# Patient Record
Sex: FEMALE | Race: BLACK OR AFRICAN AMERICAN | NOT HISPANIC OR LATINO | Employment: OTHER | URBAN - METROPOLITAN AREA
[De-identification: names, ages, dates, MRNs, and addresses within clinical notes are randomized per-mention and may not be internally consistent; named-entity substitution may affect disease eponyms.]

---

## 2017-02-01 ENCOUNTER — APPOINTMENT (OUTPATIENT)
Dept: WOMENS IMAGING | Facility: HOSPITAL | Age: 52
End: 2017-02-01

## 2017-02-01 PROCEDURE — G0279 TOMOSYNTHESIS, MAMMO: HCPCS | Performed by: RADIOLOGY

## 2017-02-01 PROCEDURE — MDREVIEWSP: Performed by: RADIOLOGY

## 2017-02-01 PROCEDURE — G0206 DX MAMMO INCL CAD UNI: HCPCS | Performed by: RADIOLOGY

## 2017-02-01 PROCEDURE — 76641 ULTRASOUND BREAST COMPLETE: CPT | Performed by: RADIOLOGY

## 2017-02-28 ENCOUNTER — OFFICE VISIT (OUTPATIENT)
Dept: INTERNAL MEDICINE | Facility: CLINIC | Age: 52
End: 2017-02-28

## 2017-02-28 VITALS
WEIGHT: 145 LBS | RESPIRATION RATE: 18 BRPM | SYSTOLIC BLOOD PRESSURE: 126 MMHG | HEART RATE: 86 BPM | OXYGEN SATURATION: 97 % | HEIGHT: 67 IN | BODY MASS INDEX: 22.76 KG/M2 | DIASTOLIC BLOOD PRESSURE: 70 MMHG

## 2017-02-28 DIAGNOSIS — M54.32 SCIATIC NERVE PAIN, LEFT: Primary | ICD-10-CM

## 2017-02-28 PROCEDURE — 99213 OFFICE O/P EST LOW 20 MIN: CPT | Performed by: INTERNAL MEDICINE

## 2017-02-28 RX ORDER — METAXALONE 800 MG/1
800 TABLET ORAL 3 TIMES DAILY PRN
Qty: 30 TABLET | Refills: 1 | Status: SHIPPED | OUTPATIENT
Start: 2017-02-28 | End: 2017-03-10

## 2017-02-28 RX ORDER — MELOXICAM 15 MG/1
15 TABLET ORAL DAILY
Qty: 30 TABLET | Refills: 1 | Status: SHIPPED | OUTPATIENT
Start: 2017-02-28 | End: 2021-04-19

## 2017-02-28 RX ORDER — METHYLPREDNISOLONE ACETATE 80 MG/ML
80 INJECTION, SUSPENSION INTRA-ARTICULAR; INTRALESIONAL; INTRAMUSCULAR; SOFT TISSUE ONCE
Status: DISCONTINUED | OUTPATIENT
Start: 2017-02-28 | End: 2017-04-10 | Stop reason: SDDI

## 2017-02-28 NOTE — PROGRESS NOTES
"Chief Complaint   Patient presents with   • Hip Pain     left hip pain, sharp, hurts to walk, continuous, x 3 years       History of Present Illness   Meenakshi Alcala is a 51 y.o. female presents for acute care left \"hip\" pain that radiates down to her foot. Started about 1 week ago and has been keeping her up at night. She has increased pain if trying to rotate the hip. Similar pain on her right hip in the past. Resolved w/ steroid injection.   The following portions of the patient's history were reviewed and updated as appropriate: allergies, current medications, past family history, past medical history, past social history, past surgical history and problem list.  Current Outpatient Prescriptions on File Prior to Visit   Medication Sig Dispense Refill   • albuterol (PROVENTIL HFA;VENTOLIN HFA) 108 (90 BASE) MCG/ACT inhaler Inhale 2 puffs Every 6 (Six) Hours. 1 inhaler 6   • amLODIPine (NORVASC) 2.5 MG tablet Take 1 tablet by mouth nightly.     • diclofenac (VOLTAREN) 1 % gel gel Apply 4 g topically 4 (Four) Times a Day. 100 g 6   • fluticasone (FLONASE) 50 MCG/ACT nasal spray PLACE TWO SPRAYS IN EACH NOSTRIL ONCE DAILY 16 each 3   • Linaclotide (LINZESS) 145 MCG capsule Take 145 mcg by mouth daily. 90 capsule 2   • sertraline (ZOLOFT) 100 MG tablet Take 1 tablet by mouth Daily. 90 tablet 2   • cloNIDine (CATAPRES) 0.3 MG tablet Take 1 tablet by mouth daily.       No current facility-administered medications on file prior to visit.      Review of Systems   Constitutional: Negative.    HENT: Negative.    Eyes: Negative.    Respiratory: Negative.    Cardiovascular: Negative.    Gastrointestinal: Negative.    Endocrine: Negative.    Genitourinary: Negative.    Musculoskeletal:        Left low back/ hip/ leg pain   Skin: Negative.    Allergic/Immunologic: Negative.    Neurological: Negative.    Hematological: Negative.    Psychiatric/Behavioral: Negative.        Objective   Physical Exam   Constitutional: She is " "oriented to person, place, and time. She appears well-developed and well-nourished.   HENT:   Head: Normocephalic and atraumatic.   Eyes: EOM are normal. Pupils are equal, round, and reactive to light.   Neck: Normal range of motion.   Cardiovascular: Normal rate, regular rhythm and normal heart sounds.    Pulmonary/Chest: Effort normal and breath sounds normal.   Musculoskeletal:   Pain to palpation low back left side and w/ hip abduction.    Neurological: She is alert and oriented to person, place, and time.   Psychiatric: She has a normal mood and affect. Her behavior is normal. Judgment and thought content normal.   Nursing note and vitals reviewed.       Visit Vitals   • /70   • Pulse 86   • Resp 18   • Ht 67\" (170.2 cm)   • Wt 145 lb (65.8 kg)   • SpO2 97%   • BMI 22.71 kg/m2       Assessment/Plan   Diagnoses and all orders for this visit:    Sciatic nerve pain, left  -     methylPREDNISolone acetate (DEPO-medrol) injection 80 mg; Inject 1 mL into the shoulder, thigh, or buttocks 1 (One) Time.  -     Ambulatory Referral to Physical Therapy Evaluate and treat    Other orders  -     meloxicam (MOBIC) 15 MG tablet; Take 1 tablet by mouth Daily.  -     metaxalone (SKELAXIN) 800 MG tablet; Take 1 tablet by mouth 3 (Three) Times a Day As Needed for muscle spasms.               "

## 2017-03-10 ENCOUNTER — TELEPHONE (OUTPATIENT)
Dept: INTERNAL MEDICINE | Facility: CLINIC | Age: 52
End: 2017-03-10

## 2017-03-10 RX ORDER — CYCLOBENZAPRINE HCL 10 MG
10 TABLET ORAL 3 TIMES DAILY PRN
Qty: 30 TABLET | Refills: 0 | Status: SHIPPED | OUTPATIENT
Start: 2017-03-10 | End: 2017-06-09 | Stop reason: SDUPTHER

## 2017-03-10 NOTE — TELEPHONE ENCOUNTER
Pt was given skelaxin on 2/28/2017 and PA was denied  Wants to know if she can get something else for her back . still hurting

## 2017-03-10 NOTE — TELEPHONE ENCOUNTER
rx for flexeril sent to pharmacy. This can cause sedation so she can try 1/2 tab and should not drive after taking.   Please make sure she has scheduled for physical therapy.

## 2017-04-10 ENCOUNTER — LAB (OUTPATIENT)
Dept: LAB | Facility: HOSPITAL | Age: 52
End: 2017-04-10

## 2017-04-10 ENCOUNTER — OFFICE VISIT (OUTPATIENT)
Dept: ONCOLOGY | Facility: CLINIC | Age: 52
End: 2017-04-10

## 2017-04-10 VITALS
WEIGHT: 144.2 LBS | SYSTOLIC BLOOD PRESSURE: 132 MMHG | BODY MASS INDEX: 23.18 KG/M2 | HEIGHT: 66 IN | RESPIRATION RATE: 16 BRPM | TEMPERATURE: 98.1 F | DIASTOLIC BLOOD PRESSURE: 70 MMHG | HEART RATE: 76 BPM

## 2017-04-10 DIAGNOSIS — C50.411 MALIGNANT NEOPLASM OF UPPER-OUTER QUADRANT OF RIGHT FEMALE BREAST (HCC): Primary | ICD-10-CM

## 2017-04-10 DIAGNOSIS — C50.411 MALIGNANT NEOPLASM OF UPPER-OUTER QUADRANT OF RIGHT FEMALE BREAST (HCC): ICD-10-CM

## 2017-04-10 LAB
ALBUMIN SERPL-MCNC: 4.7 G/DL (ref 3.5–5.2)
ALBUMIN/GLOB SERPL: 1.7 G/DL (ref 1.1–2.4)
ALP SERPL-CCNC: 123 U/L (ref 38–116)
ALT SERPL W P-5'-P-CCNC: 20 U/L (ref 0–33)
ANION GAP SERPL CALCULATED.3IONS-SCNC: 10.6 MMOL/L
AST SERPL-CCNC: 21 U/L (ref 0–32)
BASOPHILS # BLD AUTO: 0.05 10*3/MM3 (ref 0–0.1)
BASOPHILS NFR BLD AUTO: 1 % (ref 0–1.1)
BILIRUB SERPL-MCNC: 0.3 MG/DL (ref 0.1–1.2)
BUN BLD-MCNC: 9 MG/DL (ref 6–20)
BUN/CREAT SERPL: 16.7 (ref 7.3–30)
CALCIUM SPEC-SCNC: 9.8 MG/DL (ref 8.5–10.2)
CHLORIDE SERPL-SCNC: 103 MMOL/L (ref 98–107)
CO2 SERPL-SCNC: 30.4 MMOL/L (ref 22–29)
CREAT BLD-MCNC: 0.54 MG/DL (ref 0.6–1.1)
DEPRECATED RDW RBC AUTO: 47.9 FL (ref 37–49)
EOSINOPHIL # BLD AUTO: 0.14 10*3/MM3 (ref 0–0.36)
EOSINOPHIL NFR BLD AUTO: 2.7 % (ref 1–5)
ERYTHROCYTE [DISTWIDTH] IN BLOOD BY AUTOMATED COUNT: 14.9 % (ref 11.7–14.5)
GFR SERPL CREATININE-BSD FRML MDRD: 143 ML/MIN/1.73
GLOBULIN UR ELPH-MCNC: 2.7 GM/DL (ref 1.8–3.5)
GLUCOSE BLD-MCNC: 98 MG/DL (ref 74–124)
HCT VFR BLD AUTO: 41.6 % (ref 34–45)
HGB BLD-MCNC: 13 G/DL (ref 11.5–14.9)
IMM GRANULOCYTES # BLD: 0.01 10*3/MM3 (ref 0–0.03)
IMM GRANULOCYTES NFR BLD: 0.2 % (ref 0–0.5)
LYMPHOCYTES # BLD AUTO: 2.03 10*3/MM3 (ref 1–3.5)
LYMPHOCYTES NFR BLD AUTO: 38.7 % (ref 20–49)
MCH RBC QN AUTO: 28.3 PG (ref 27–33)
MCHC RBC AUTO-ENTMCNC: 31.3 G/DL (ref 32–35)
MCV RBC AUTO: 90.6 FL (ref 83–97)
MONOCYTES # BLD AUTO: 0.41 10*3/MM3 (ref 0.25–0.8)
MONOCYTES NFR BLD AUTO: 7.8 % (ref 4–12)
NEUTROPHILS # BLD AUTO: 2.6 10*3/MM3 (ref 1.5–7)
NEUTROPHILS NFR BLD AUTO: 49.6 % (ref 39–75)
NRBC BLD MANUAL-RTO: 0 /100 WBC (ref 0–0)
PLATELET # BLD AUTO: 212 10*3/MM3 (ref 150–375)
PMV BLD AUTO: 9.3 FL (ref 8.9–12.1)
POTASSIUM BLD-SCNC: 4.9 MMOL/L (ref 3.5–4.7)
PROT SERPL-MCNC: 7.4 G/DL (ref 6.3–8)
RBC # BLD AUTO: 4.59 10*6/MM3 (ref 3.9–5)
SODIUM BLD-SCNC: 144 MMOL/L (ref 134–145)
WBC NRBC COR # BLD: 5.24 10*3/MM3 (ref 4–10)

## 2017-04-10 PROCEDURE — 36415 COLL VENOUS BLD VENIPUNCTURE: CPT

## 2017-04-10 PROCEDURE — 99213 OFFICE O/P EST LOW 20 MIN: CPT | Performed by: INTERNAL MEDICINE

## 2017-04-10 PROCEDURE — 80053 COMPREHEN METABOLIC PANEL: CPT

## 2017-04-10 PROCEDURE — 85025 COMPLETE CBC W/AUTO DIFF WBC: CPT

## 2017-04-10 NOTE — PROGRESS NOTES
Subjective .     REASONS FOR FOLLOWUP:  Breast cancer    HISTORY OF PRESENT ILLNESS:  The patient is a 51 y.o. year old female  who is here for follow-up with the above-mentioned history.    Denies neurological symptoms.  Denies weight loss.  Denies pain other than chronic sciatica.  She is currently in her second week of physical therapy.  She is being treated through her PCP, Dr. Zavaleta for this.  No change in chronic nausea.    Past Medical History:   Diagnosis Date   • Anemia    • H/O transfusion of packed red blood cells    • Headache    • Malignant neoplasm of breast 2013    Right, T2N1M0, Stage IIB       HEMATOLOGIC/ONCOLOGIC HISTORY:  (History from previous dates can be found in the separate document.)    MEDICATIONS    Current Outpatient Prescriptions:   •  albuterol (PROVENTIL HFA;VENTOLIN HFA) 108 (90 BASE) MCG/ACT inhaler, Inhale 2 puffs Every 6 (Six) Hours., Disp: 1 inhaler, Rfl: 6  •  amLODIPine (NORVASC) 2.5 MG tablet, Take 1 tablet by mouth nightly., Disp: , Rfl:   •  cloNIDine (CATAPRES) 0.3 MG tablet, Take 1 tablet by mouth daily., Disp: , Rfl:   •  cyclobenzaprine (FLEXERIL) 10 MG tablet, Take 1 tablet by mouth 3 (Three) Times a Day As Needed for muscle spasms., Disp: 30 tablet, Rfl: 0  •  diclofenac (VOLTAREN) 1 % gel gel, Apply 4 g topically 4 (Four) Times a Day., Disp: 100 g, Rfl: 6  •  fluticasone (FLONASE) 50 MCG/ACT nasal spray, PLACE TWO SPRAYS IN EACH NOSTRIL ONCE DAILY, Disp: 16 each, Rfl: 3  •  Linaclotide (LINZESS) 145 MCG capsule, Take 145 mcg by mouth daily., Disp: 90 capsule, Rfl: 2  •  meloxicam (MOBIC) 15 MG tablet, Take 1 tablet by mouth Daily., Disp: 30 tablet, Rfl: 1  •  sertraline (ZOLOFT) 100 MG tablet, Take 1 tablet by mouth Daily., Disp: 90 tablet, Rfl: 2  No current facility-administered medications for this visit.     ALLERGIES:     Allergies   Allergen Reactions   • Erythromycin        SOCIAL HISTORY:       Social History     Social History   • Marital status:   "    Spouse name: N/A   • Number of children: N/A   • Years of education: College     Occupational History   • [employer]     Social History Main Topics   • Smoking status: Former Smoker     Packs/day: 1.00     Years: 25.00     Types: Cigarettes   • Smokeless tobacco: Former User   • Alcohol use Yes      Comment: Occasional   • Drug use: No   • Sexual activity: Not on file     Other Topics Concern   • Not on file     Social History Narrative         FAMILY HISTORY:  Family History   Problem Relation Age of Onset   • Heart disease Mother    • Hypertension Mother    • Stomach cancer Father    • Ovarian cancer Maternal Aunt    • Stomach cancer Maternal Aunt        REVIEW OF SYSTEMS:  GENERAL: No change in appetite or weight;   No fevers, chills, sweats.    SKIN: No nonhealing lesions.   No rashes.  HEME/LYMPH: No easy bruising, bleeding.   No swollen nodes.   EYES: No vision changes or diplopia.   ENT: No tinnitus, hearing loss, gum bleeding, epistaxis, hoarseness or dysphagia.   RESPIRATORY: No cough, shortness of breath, hemoptysis or wheezing.   CVS: No chest pain, palpitations, orthopnea, dyspnea on exertion or PND.   GI: see HPI  : No lower tract obstructive symptoms, dysuria or hematuria.   MUSCULOSKELETAL: see HPI  NEUROLOGICAL: No global weakness, loss of consciousness or seizures.   PSYCHIATRIC: No increased nervousness, mood changes or depression.       Objective    Vitals:    04/10/17 1025   BP: 132/70   Pulse: 76   Resp: 16   Temp: 98.1 °F (36.7 °C)   Weight: 144 lb 3.2 oz (65.4 kg)   Height: 66.14\" (168 cm)  Comment: new ht. without shoes   PainSc: 10-Worst pain ever  Comment: left hip     Current Status 4/10/2017   ECOG score 0      PHYSICAL EXAM:    GENERAL:  Well-developed, well-nourished in no acute distress.   SKIN:  Warm, dry without rashes, purpura or petechiae.  HEAD:  Normocephalic.  EYES:  Pupils equal, round and reactive to light.  EOMs intact.  Conjunctivae " normal.  EARS:  Hearing intact.  NOSE:  Septum midline.  No excoriations or nasal discharge.  MOUTH:  Tongue is well-papillated; no stomatitis or ulcers.  Lips normal.  THROAT:  Oropharynx without lesions or exudates.  NECK:  Supple with good range of motion; no thyromegaly or masses, no JVD.  LYMPHATICS:  No cervical, supraclavicular, axillary or inguinal adenopathy.  CHEST:  Lungs clear to percussion and auscultation. Good airflow.  CARDIAC:  Regular rate and rhythm without murmurs, rubs or gallops. Normal S1,S2.  BREAST: no masses by palpation or inspection   ABDOMEN:  Soft, nontender with no organomegaly or masses.  EXTREMITIES:  No clubbing, cyanosis or edema.  NEUROLOGICAL:  Cranial Nerves II-XII grossly intact.  No focal neurological deficits.  PSYCHIATRIC:  Normal affect and mood.      RECENT LABS:        WBC   Date/Time Value Ref Range Status   04/10/2017 10:11 AM 5.24 4.00 - 10.00 10*3/mm3 Final     Hemoglobin   Date/Time Value Ref Range Status   04/10/2017 10:11 AM 13.0 11.5 - 14.9 g/dL Final     Platelets   Date/Time Value Ref Range Status   04/10/2017 10:11  150 - 375 10*3/mm3 Final       Assessment/Plan     ASSESSMENT:  1. T2N1M0 (stage IIB) invasive ductal carcinoma of the right breast. Floyd score 9 of 9 (high grade), triple-negative, 3.3 cm tumor. Right mastectomy and TRAM flap reconstruction. Completed dose-dense Adriamycin/Cytoxan with dose-dense Taxol 07/11/2013. Completed radiation October 2013.     2. Nausea.  IBS.  Managed by both Dr. Gallegos and Dr. Vandana Zavaleta.    3. Intermittent blurry vision, left eye more so than right eye.  She has seen her ophthalmologist for this.  She states nothing concerning was found.  She follows with Dr. Kiran Cabrera of neurology for this.  A prior MRI read as possible optic neuritis.  She states the follow-up MRI was fine.  She did not complain of this today.    4.  Weight loss.  Resolved.  165 pounds October 2014.  150 pounds April 2015.  145 pounds  January 2016.  140 pounds February 2016.  136 pounds April 1, 2016.  132 pounds April 25, 2016.  Gained, up to 139 pounds, 5/9/16.  Weight is been stable since then.    5.  2 liver lesions on 1/31/13 staging CAT scan.  Liver protocol CT subsequently read as benign liver lesions.  Liver lesions unchanged on CT 5/4/16.    6.  She has an annual  for breast cancer.  She would like to donate the money raised to needy patients with breast cancer.  She does not want to go through a foundation.  She plans on calling our  to discuss options in 6 months or so.    7.  Chronic sciatica.  Treatment through her PCP, Dr. Vandana Zavaleta.  Currently undergoing physical therapy.    PLAN:   M.D. CBC CMP 6 months  Her port has been removed.   Last mammogram, 2/1/17, BI-RADS 2.

## 2017-05-30 DIAGNOSIS — K59.09 CHRONIC CONSTIPATION: ICD-10-CM

## 2017-05-30 RX ORDER — LINACLOTIDE 145 UG/1
CAPSULE, GELATIN COATED ORAL
Qty: 90 CAPSULE | Refills: 1 | Status: SHIPPED | OUTPATIENT
Start: 2017-05-30 | End: 2021-02-22

## 2017-06-02 ENCOUNTER — LAB (OUTPATIENT)
Dept: INTERNAL MEDICINE | Facility: CLINIC | Age: 52
End: 2017-06-02

## 2017-06-02 ENCOUNTER — CLINICAL SUPPORT (OUTPATIENT)
Dept: INTERNAL MEDICINE | Facility: CLINIC | Age: 52
End: 2017-06-02

## 2017-06-02 DIAGNOSIS — Z78.0 POSTMENOPAUSAL: Primary | ICD-10-CM

## 2017-06-02 DIAGNOSIS — C50.411 MALIGNANT NEOPLASM OF UPPER-OUTER QUADRANT OF RIGHT FEMALE BREAST (HCC): ICD-10-CM

## 2017-06-02 PROCEDURE — 77080 DXA BONE DENSITY AXIAL: CPT | Performed by: INTERNAL MEDICINE

## 2017-06-03 LAB
ALBUMIN SERPL-MCNC: 4.7 G/DL (ref 3.5–5.2)
ALBUMIN/GLOB SERPL: 1.8 G/DL
ALP SERPL-CCNC: 106 U/L (ref 39–117)
ALT SERPL-CCNC: 17 U/L (ref 1–33)
AST SERPL-CCNC: 20 U/L (ref 1–32)
BASOPHILS # BLD AUTO: 0.05 10*3/MM3 (ref 0–0.2)
BASOPHILS NFR BLD AUTO: 1 % (ref 0–1.5)
BILIRUB SERPL-MCNC: 0.4 MG/DL (ref 0.1–1.2)
BUN SERPL-MCNC: 10 MG/DL (ref 6–20)
BUN/CREAT SERPL: 18.9 (ref 7–25)
CALCIUM SERPL-MCNC: 10.3 MG/DL (ref 8.6–10.5)
CHLORIDE SERPL-SCNC: 101 MMOL/L (ref 98–107)
CO2 SERPL-SCNC: 28.4 MMOL/L (ref 22–29)
CREAT SERPL-MCNC: 0.53 MG/DL (ref 0.57–1)
EOSINOPHIL # BLD AUTO: 0.08 10*3/MM3 (ref 0–0.7)
EOSINOPHIL NFR BLD AUTO: 1.6 % (ref 0.3–6.2)
ERYTHROCYTE [DISTWIDTH] IN BLOOD BY AUTOMATED COUNT: 14.8 % (ref 11.7–13)
GLOBULIN SER CALC-MCNC: 2.6 GM/DL
GLUCOSE SERPL-MCNC: 69 MG/DL (ref 65–99)
HCT VFR BLD AUTO: 38.8 % (ref 35.6–45.5)
HGB BLD-MCNC: 12.3 G/DL (ref 11.9–15.5)
IMM GRANULOCYTES # BLD: 0 10*3/MM3 (ref 0–0.03)
IMM GRANULOCYTES NFR BLD: 0 % (ref 0–0.5)
LYMPHOCYTES # BLD AUTO: 2.03 10*3/MM3 (ref 0.9–4.8)
LYMPHOCYTES NFR BLD AUTO: 39.6 % (ref 19.6–45.3)
MCH RBC QN AUTO: 28.1 PG (ref 26.9–32)
MCHC RBC AUTO-ENTMCNC: 31.7 G/DL (ref 32.4–36.3)
MCV RBC AUTO: 88.8 FL (ref 80.5–98.2)
MONOCYTES # BLD AUTO: 0.55 10*3/MM3 (ref 0.2–1.2)
MONOCYTES NFR BLD AUTO: 10.7 % (ref 5–12)
NEUTROPHILS # BLD AUTO: 2.41 10*3/MM3 (ref 1.9–8.1)
NEUTROPHILS NFR BLD AUTO: 47.1 % (ref 42.7–76)
PLATELET # BLD AUTO: 282 10*3/MM3 (ref 140–500)
POTASSIUM SERPL-SCNC: 4.2 MMOL/L (ref 3.5–5.2)
PROT SERPL-MCNC: 7.3 G/DL (ref 6–8.5)
RBC # BLD AUTO: 4.37 10*6/MM3 (ref 3.9–5.2)
SODIUM SERPL-SCNC: 142 MMOL/L (ref 136–145)
WBC # BLD AUTO: 5.12 10*3/MM3 (ref 4.5–10.7)

## 2017-06-04 NOTE — PROGRESS NOTES
Your laboratory results are NORMAL. We will discuss these results in detail at your next office visit. Please call with any questions or concerns.  Sincerely,  Vandana cervantes MD

## 2017-06-09 ENCOUNTER — OFFICE VISIT (OUTPATIENT)
Dept: INTERNAL MEDICINE | Facility: CLINIC | Age: 52
End: 2017-06-09

## 2017-06-09 VITALS
DIASTOLIC BLOOD PRESSURE: 74 MMHG | WEIGHT: 150 LBS | SYSTOLIC BLOOD PRESSURE: 116 MMHG | HEART RATE: 84 BPM | OXYGEN SATURATION: 99 % | BODY MASS INDEX: 24.11 KG/M2

## 2017-06-09 DIAGNOSIS — M54.42 CHRONIC LEFT-SIDED LOW BACK PAIN WITH LEFT-SIDED SCIATICA: Primary | ICD-10-CM

## 2017-06-09 DIAGNOSIS — G89.29 CHRONIC LEFT-SIDED LOW BACK PAIN WITH LEFT-SIDED SCIATICA: Primary | ICD-10-CM

## 2017-06-09 PROBLEM — M85.80 OSTEOPENIA: Status: ACTIVE | Noted: 2017-06-09

## 2017-06-09 PROCEDURE — 72110 X-RAY EXAM L-2 SPINE 4/>VWS: CPT | Performed by: INTERNAL MEDICINE

## 2017-06-09 PROCEDURE — 99214 OFFICE O/P EST MOD 30 MIN: CPT | Performed by: INTERNAL MEDICINE

## 2017-06-09 RX ORDER — TRAMADOL HYDROCHLORIDE 50 MG/1
50 TABLET ORAL EVERY 6 HOURS PRN
Qty: 30 TABLET | Refills: 3 | Status: SHIPPED | OUTPATIENT
Start: 2017-06-09 | End: 2018-01-31 | Stop reason: SDUPTHER

## 2017-06-09 RX ORDER — CYCLOBENZAPRINE HCL 10 MG
10 TABLET ORAL 3 TIMES DAILY PRN
Qty: 30 TABLET | Refills: 3 | Status: SHIPPED | OUTPATIENT
Start: 2017-06-09 | End: 2018-01-31 | Stop reason: SDUPTHER

## 2017-06-20 ENCOUNTER — HOSPITAL ENCOUNTER (OUTPATIENT)
Dept: MRI IMAGING | Facility: HOSPITAL | Age: 52
Discharge: HOME | End: 2017-06-20
Admitting: INTERNAL MEDICINE

## 2017-06-20 DIAGNOSIS — M54.42 CHRONIC LEFT-SIDED LOW BACK PAIN WITH LEFT-SIDED SCIATICA: ICD-10-CM

## 2017-06-20 DIAGNOSIS — G89.29 CHRONIC LEFT-SIDED LOW BACK PAIN WITH LEFT-SIDED SCIATICA: ICD-10-CM

## 2017-06-20 PROCEDURE — A9577 INJ MULTIHANCE: HCPCS | Performed by: INTERNAL MEDICINE

## 2017-06-20 PROCEDURE — 72158 MRI LUMBAR SPINE W/O & W/DYE: CPT

## 2017-06-20 PROCEDURE — 82565 ASSAY OF CREATININE: CPT

## 2017-06-20 PROCEDURE — 0 GADOBENATE DIMEGLUMINE 529 MG/ML SOLUTION: Performed by: INTERNAL MEDICINE

## 2017-06-20 RX ADMIN — GADOBENATE DIMEGLUMINE 14 ML: 529 INJECTION, SOLUTION INTRAVENOUS at 16:09

## 2017-06-21 LAB — CREAT BLDA-MCNC: 0.6 MG/DL (ref 0.6–1.3)

## 2017-07-10 ENCOUNTER — OFFICE VISIT (OUTPATIENT)
Dept: PAIN MEDICINE | Facility: CLINIC | Age: 52
End: 2017-07-10

## 2017-07-10 VITALS
SYSTOLIC BLOOD PRESSURE: 109 MMHG | DIASTOLIC BLOOD PRESSURE: 76 MMHG | HEART RATE: 85 BPM | BODY MASS INDEX: 22.22 KG/M2 | RESPIRATION RATE: 18 BRPM | OXYGEN SATURATION: 90 % | WEIGHT: 150 LBS | TEMPERATURE: 98.1 F | HEIGHT: 69 IN

## 2017-07-10 DIAGNOSIS — M71.38 SYNOVIAL CYST OF LUMBAR FACET JOINT: ICD-10-CM

## 2017-07-10 DIAGNOSIS — M47.816 LUMBAR FACET ARTHROPATHY: ICD-10-CM

## 2017-07-10 DIAGNOSIS — M51.36 BULGE OF LUMBAR DISC WITHOUT MYELOPATHY: Primary | ICD-10-CM

## 2017-07-10 PROBLEM — M51.369 BULGE OF LUMBAR DISC WITHOUT MYELOPATHY: Status: ACTIVE | Noted: 2017-07-10

## 2017-07-10 LAB
POC AMPHETAMINES: NEGATIVE
POC BARBITURATES: NEGATIVE
POC BENZODIAZEPHINES: NEGATIVE
POC COCAINE: NEGATIVE
POC METHADONE: NEGATIVE
POC METHAMPHETAMINE SCREEN URINE: NEGATIVE
POC OPIATES: NEGATIVE
POC OXYCODONE: NEGATIVE
POC PHENCYCLIDINE: NEGATIVE
POC PROPOXYPHENE: NEGATIVE
POC THC: POSITIVE
POC TRICYCLIC ANTIDEPRESSANTS: POSITIVE

## 2017-07-10 PROCEDURE — 99204 OFFICE O/P NEW MOD 45 MIN: CPT | Performed by: NURSE PRACTITIONER

## 2017-07-10 PROCEDURE — 80305 DRUG TEST PRSMV DIR OPT OBS: CPT | Performed by: NURSE PRACTITIONER

## 2017-07-10 NOTE — PATIENT INSTRUCTIONS
Facet Joint Block  The facet joints connect the bones of the spine (vertebrae). They make it possible for you to bend, twist, and make other movements with your spine. They also prevent you from overbending, overtwisting, and making other excessive movements.   A facet joint block is a procedure where a numbing medicine (anesthetic) is injected into a facet joint. Often, a type of anti-inflammatory medicine called a steroid is also injected. A facet joint block may be done for two reasons:   · Diagnosis. A facet joint block may be done as a test to see whether neck or back pain is caused by a worn-down or infected facet joint. If the pain gets better after a facet joint block, it means the pain is probably coming from the facet joint. If the pain does not get better, it means the pain is probably not coming from the facet joint.    · Therapy. A facet joint block may be done to relieve neck or back pain caused by a facet joint. A facet joint block is only done as a therapy if the pain does not improve with medicine, exercise programs, physical therapy, and other forms of pain management.  LET YOUR HEALTH CARE PROVIDER KNOW ABOUT:   · Any allergies you have.    · All medicines you are taking, including vitamins, herbs, eyedrops, and over-the-counter medicines and creams.    · Previous problems you or members of your family have had with the use of anesthetics.    · Any blood disorders you have had.    · Other health problems you have.  RISKS AND COMPLICATIONS  Generally, having a facet joint block is safe. However, as with any procedure, complications can occur. Possible complications associated with having a facet joint block include:   · Bleeding.    · Injury to a nerve near the injection site.    · Pain at the injection site.    · Weakness or numbness in areas controlled by nerves near the injection site.    · Infection.    · Temporary fluid retention.    · Allergic reaction to anesthetics or medicines used during  the procedure.  BEFORE THE PROCEDURE   · Follow your health care provider's instructions if you are taking dietary supplements or medicines. You may need to stop taking them or reduce your dosage.    · Do not take any new dietary supplements or medicines without asking your health care provider first.    · Follow your health care provider's instructions about eating and drinking before the procedure. You may need to stop eating and drinking several hours before the procedure.    · Arrange to have an adult drive you home after the procedure.  PROCEDURE  · You may need to remove your clothing and dress in an open-back gown so that your health care provider can access your spine.    · The procedure will be done while you are lying on an X-ray table. Most of the time you will be asked to lie on your stomach, but you may be asked to lie in a different position if an injection will be made in your neck.    · Special machines will be used to monitor your oxygen levels, heart rate, and blood pressure.    · If an injection will be made in your neck, an intravenous (IV) tube will be inserted into one of your veins. Fluids and medicine will flow directly into your body through the IV tube.    · The area over the facet joint where the injection will be made will be cleaned with an antiseptic soap. The surrounding skin will be covered with sterile drapes.    · An anesthetic will be applied to your skin to make the injection area numb. You may feel a temporary stinging or burning sensation.    · A video X-ray machine will be used to locate the joint. A contrast dye may be injected into the facet joint area to help with locating the joint.    · When the joint is located, an anesthetic medicine will be injected into the joint through the needle.    · Your health care provider will ask you whether you feel pain relief. If you do feel relief, a steroid may be injected to provide pain relief for a longer period of time. If you do not  feel relief or feel only partial relief, additional injections of an anesthetic may be made in other facet joints.    · The needle will be removed, the skin will be cleansed, and bandages will be applied.    AFTER THE PROCEDURE   · You will be observed for 15-30 minutes before being allowed to go home. Do not drive. Have an adult drive you or take a taxi or public transportation instead.    · If you feel pain relief, the pain will return in several hours or days when the anesthetic wears off.    · You may feel pain relief 2-14 days after the procedure. The amount of time this relief lasts varies from person to person.    · It is normal to feel some tenderness over the injected area(s) for 2 days following the procedure.    · If you have diabetes, you may have a temporary increase in blood sugar.      This information is not intended to replace advice given to you by your health care provider. Make sure you discuss any questions you have with your health care provider.     Document Released: 05/08/2008 Document Revised: 01/08/2016 Document Reviewed: 09/12/2016  [employer] Patient Education ©2017 [employer]

## 2017-07-10 NOTE — PROGRESS NOTES
"CHIEF COMPLAINT  Patient is here today as a new patient for back pain referred by Dr. Zavaleta. Patient states all of her pain is on the left side of her low back radiating to the left hip, LLE and left foot. Patient has never been to pain management in the past. States she has never had any epidurals due to the fear of needles. Patient states that she has a bulging disc that was found on an MRI performed on 6/9/17. Patient states she has previously completed Chiropractic treatment and PT with minimal relief. She states she has also had some dry needling with minimal relief.    Subjective   Meenakshi Alcala is a  RHD 52 y.o. female.   She presents to the office for evaluation of back pain and leg pain. She was referred here by Dr. Zavaleta.  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 She lives by herself. Does not smoke. Does not drink alcohol.  She does smoke marijuana a couple times/day. Family history is negative for alcoholism. Family history is negative for back problems. Family history is positive for RA(maternal aunt).     Complains of pain in her left low back. It used to radiate to her left hip but this improved with PT. She does notice intermittent pain going to her left medial knee and left medial ankle. Today her pain is 10/10VAS( no tears through exam). Describes the pain the back pain as a continuous aching and throbbing. The leg pain is an intermittent sharp \"lightning.\" Pain increases with movement, prolonged positioning; pain decreases with heat. Is taking Flexeril 10 mg 1/day, Lauri tramadol 50 mg 1/2 tablet BID and meloxicam 15 mg daily. Denies any side effects from the regimen. The regimen helps decrease her pain mildly. ADL's by self.    Her back pain started several years ago. She did notice that it started after chemotherapy and cancer treatment. Kept trying to stretch. Used to run. Back pain continued to worsen. In early 2017, she started noticing the pain shooting down her leg. Her pain " also interfered with her sleep. Has completed PT and chiropractor treatment with mild temporary relief. Had dry needling with mild relief in her back but significant relief in her hip.    Has not had any injections in her back. Has never seen back surgeon.     Patient has failed PT, chiropractor and medications.     Back Pain   This is a chronic problem. The current episode started more than 1 year ago. The problem occurs constantly. The problem has been gradually worsening since onset. The pain is present in the sacro-iliac. The quality of the pain is described as aching. The pain radiates to the left knee and left foot. The pain is at a severity of 10/10. The pain is severe. The pain is the same all the time. The symptoms are aggravated by bending, position, standing, sitting and twisting. Associated symptoms include numbness and weakness. Pertinent negatives include no bladder incontinence, bowel incontinence, chest pain, dysuria, fever or headaches. Risk factors include history of cancer. She has tried analgesics, bed rest, heat, home exercises, muscle relaxant and NSAIDs for the symptoms. The treatment provided mild relief.      PEG Assessment   What number best describes your pain on average in the past week?8  What number best describes how, during the past week, pain has interfered with your enjoyment of life?7  What number best describes how, during the past week, pain has interfered with your general activity?  7      Current Outpatient Prescriptions:   •  albuterol (PROVENTIL HFA;VENTOLIN HFA) 108 (90 BASE) MCG/ACT inhaler, Inhale 2 puffs Every 6 (Six) Hours., Disp: 1 inhaler, Rfl: 6  •  amLODIPine (NORVASC) 2.5 MG tablet, Take 1 tablet by mouth nightly., Disp: , Rfl:   •  cloNIDine (CATAPRES) 0.3 MG tablet, Take 1 tablet by mouth daily., Disp: , Rfl:   •  cyclobenzaprine (FLEXERIL) 10 MG tablet, Take 1 tablet by mouth 3 (Three) Times a Day As Needed for Muscle Spasms., Disp: 30 tablet, Rfl: 3  •   diclofenac (VOLTAREN) 1 % gel gel, Apply 4 g topically 4 (Four) Times a Day., Disp: 100 g, Rfl: 6  •  fluticasone (FLONASE) 50 MCG/ACT nasal spray, PLACE TWO SPRAYS IN EACH NOSTRIL ONCE DAILY, Disp: 16 each, Rfl: 3  •  LINZESS 145 MCG capsule, TAKE ONE CAPSULE BY MOUTH DAILY, Disp: 90 capsule, Rfl: 1  •  meloxicam (MOBIC) 15 MG tablet, Take 1 tablet by mouth Daily., Disp: 30 tablet, Rfl: 1  •  sertraline (ZOLOFT) 100 MG tablet, Take 1 tablet by mouth Daily., Disp: 90 tablet, Rfl: 2  •  traMADol (ULTRAM) 50 MG tablet, Take 1 tablet by mouth Every 6 (Six) Hours As Needed for Moderate Pain (4-6)., Disp: 30 tablet, Rfl: 3    The following portions of the patient's history were reviewed and updated as appropriate: allergies, current medications, past family history, past medical history, past social history, past surgical history and problem list.      REVIEW OF PERTINENT MEDICAL DATA    ROMULO [id number]--- prescription for tramadol 50 mg quantity 30 from Dr. Vandana Zavaleta on 6/13/17.  There is only one prescriber and one pharmacy on this Romulo.  Otherwise Romulo is negative.    BECK Inventory-- 8    Reviewed Dr. Zavaleta office visit 6-9-17-- patient presents for follow-up.  Has hypertension and is regularly with medication.  Continue pain in her left low back.  Pain radiates into her head.  She states running cycle issues been able to cycle for 4 years.  Tried PT and Flexeril continues to have pain.  Is fairly constant. Given temporary prescription for Tramadol. Also sent for lumbar MRI.    MRI OF THE LUMBAR SPINE WITH AND WITHOUT CONTRAST 06/20/2017      CLINICAL HISTORY: Patient has a history of breast cancer with mastectomy  3 years ago, has chronic left-sided low back pain with left lower  extremity numbness, and tingling left-sided radiculopathy.      TECHNIQUE: Sagittal T1, proton density and fat-suppressed T2 and  postcontrast sagittal fat-suppressed T1-weighted images were obtained of  the lumbar spine. In  addition, axial thin cut T1-weighted images were  obtained angled through the interspaces from L3 to S1. Axial T2 and  postcontrast axial T1-weighted images were obtained from L1 to S1.      COMPARISON: There are no prior lumbar spine imaging studies from [facility] for comparison.      FINDINGS: The distal thoracic cord and the conus are normal in signal  intensity. The conus terminates at the L1-2 interspace level which is  normal.      The T11-12, T12-L1, L1-2, and L2-3 disc spaces and facets are normal  with no canal or foraminal narrowing from T11 to L3.      At L3-4, there is mild bilateral facet overgrowth, mild disc space  narrowing, and disc desiccation. The posterior disc margin is normal.  There is no central canal or right foraminal narrowing. There is a far  left lateral annular tear and there is a far left lateral disc  protrusion that abuts the anterior medial aspect of the left L3 nerve  root lateral to the left foramen to the far left laterally at L3-4.      At L4-5, there is mild bilateral facet overgrowth and minimal posterior  disc bulge. There is no central canal or foraminal narrowing at L4-5.      At L5-S1, there is moderate bilateral facet overgrowth. There is a mild  posterior disc bulge. There is no central canal or lateral recess or  right foraminal narrowing. There is some minimal bulging disc material  into the left foramen, synovial thickening off the left facets extending  to the posterior superior left foramen, and mild left foraminal  narrowing. Furthermore, there is T1 low signal, T2 high signal and  increased enhancement in the left facets at L5-S1 compatible with  degenerative marrow edema and enhancement left facets that tracks into  the left pedicles at L5 and S1.      The remainder of the marrow signal intensity in the lumbar spine is  normal with no evidence of metastatic disease of the lumbar spine.      IMPRESSION:  1. No evidence of metastatic disease of the  "lumbar spine.  2. There is a far left lateral disc protrusion or small disc herniation  at L3-4 that abuts the anterior medial aspect of the left L3 nerve root  lateral to the left foramen to the far left laterally at L3-4. There is  no central canal or right foraminal narrowing at L3-4.  3. At L4-5, there is mild bilateral facet overgrowth and minimal  posterior disc bulge, but no canal or foraminal narrowing.  4. At L5-S1, there is mild to moderate right and there is moderate left  facet overgrowth and fluid in the left facet joint, degenerative marrow  edema and enhancement left facets at L5-S1 tracking into the left  pedicles at L5 and S1. There is mild left foraminal narrowing at L5-S1.  The remainder of the lumbar spine MRI is unremarkable.      This report was finalized on 6/21/2017 4:47 PM by Dr. Romeo Lam MD.    Review of Systems   Constitutional: Negative for chills and fever.   Respiratory: Negative for shortness of breath.    Cardiovascular: Negative for chest pain.   Gastrointestinal: Positive for nausea. Negative for bowel incontinence, constipation, diarrhea and vomiting.   Genitourinary: Negative for bladder incontinence, difficulty urinating, dyspareunia and dysuria.   Musculoskeletal: Positive for back pain.   Neurological: Positive for weakness and numbness. Negative for dizziness, light-headedness and headaches.   Psychiatric/Behavioral: Positive for sleep disturbance. Negative for confusion, hallucinations, self-injury and suicidal ideas. The patient is nervous/anxious.      Vitals:    07/10/17 1508   BP: 109/76   Pulse: 85   Resp: 18   Temp: 98.1 °F (36.7 °C)   SpO2: 90%   Weight: 150 lb (68 kg)   Height: 69\" (175.3 cm)   PainSc: 10-Worst pain ever   PainLoc: Back     Objective   Physical Exam   Constitutional: She is oriented to person, place, and time. Vital signs are normal. She appears well-developed and well-nourished. She is cooperative.   HENT:   Head: Normocephalic and atraumatic. "   Nose: Nose normal.   Eyes: Conjunctivae, EOM and lids are normal. Pupils are equal, round, and reactive to light.   Neck: Trachea normal. Neck supple.   Cardiovascular: Normal rate, regular rhythm and normal heart sounds.    Pulmonary/Chest: Effort normal and breath sounds normal. No respiratory distress.   Abdominal: Normal appearance.   Musculoskeletal:        Lumbar back: She exhibits tenderness, bony tenderness (moderate tenderness to palpation of left L4-S1 facets.) and pain.   Negative SLR bilaterally    + lumbar facet loading maneuver    Negative bilateral SI joint tenderness   Neurological: She is alert and oriented to person, place, and time. She has normal strength and normal reflexes. No cranial nerve deficit. Coordination and gait normal.   Reflex Scores:       Patellar reflexes are 2+ on the right side and 2+ on the left side.       Achilles reflexes are 2+ on the right side and 2+ on the left side.  Skin: Skin is warm, dry and intact.   Psychiatric: She has a normal mood and affect. Her speech is normal and behavior is normal. Judgment and thought content normal. Cognition and memory are normal.   Nursing note and vitals reviewed.      Assessment/Plan   Meenakshi was seen today for back pain.    Diagnoses and all orders for this visit:    Bulge of lumbar disc without myelopathy    Lumbar facet arthropathy  -     Cancel: Case Request  -     Case Request    Synovial cyst of lumbar facet joint  -     Case Request      --- Routine new patient initial UDS in office today as part of monitoring requirements for controlled substances.  The specimen was viewed and the immunoassay result reviewed and is +THC, +TCA.  This specimen will be sent to [facility] laboratory for confirmation.     --- Left L4-S1 FJN. Was found to have Left L5-S1 facet cyst. No blood thinners. Reviewed the procedure at length with the patient.  Included in the review was expectations, complications, risk and benefits.The procedure was  described in detail and the risks, benefits and alternatives were discussed with the patient (including but not limited to: bleeding, infection, nerve damage, worsening of pain, inability to perform injection, paralysis, seizures, and death) who agreed to proceed.  Discussed the potential for sedation if warranted/wanted.  Questions were answered and in a way the patient could understand.  Patient verbalized understanding and wishes to proceed.  This intervention will be ordered.  --- Could consider TF JAMES Left L2 and L4 or L5. PAtient's primary complaint is her left low back pain.   --- Follow-up after procedure or sooner if needed.         ROMULO REPORT    ROMULO report has been reviewed and scanned into the patient's chart.    Date of last ROMULO : 7-7-17          EMR Dragon/Transcription disclaimer:   Much of this encounter note is an electronic transcription/translation of spoken language to printed text. The electronic translation of spoken language may permit erroneous, or at times, nonsensical words or phrases to be inadvertently transcribed; Although I have reviewed the note for such errors, some may still exist.

## 2017-07-24 DIAGNOSIS — K21.00 GASTROESOPHAGEAL REFLUX DISEASE WITH ESOPHAGITIS: ICD-10-CM

## 2017-07-24 DIAGNOSIS — R11.0 NAUSEA: ICD-10-CM

## 2017-07-24 RX ORDER — DEXLANSOPRAZOLE 60 MG/1
CAPSULE, DELAYED RELEASE ORAL
Qty: 90 CAPSULE | Refills: 1 | Status: SHIPPED | OUTPATIENT
Start: 2017-07-24 | End: 2018-05-18 | Stop reason: SDUPTHER

## 2017-08-01 ENCOUNTER — DOCUMENTATION (OUTPATIENT)
Dept: PAIN MEDICINE | Facility: CLINIC | Age: 52
End: 2017-08-01

## 2017-08-01 ENCOUNTER — OUTSIDE FACILITY SERVICE (OUTPATIENT)
Dept: PAIN MEDICINE | Facility: CLINIC | Age: 52
End: 2017-08-01

## 2017-08-01 PROCEDURE — 64494 INJ PARAVERT F JNT L/S 2 LEV: CPT | Performed by: ANESTHESIOLOGY

## 2017-08-01 PROCEDURE — 64493 INJ PARAVERT F JNT L/S 1 LEV: CPT | Performed by: ANESTHESIOLOGY

## 2017-08-02 NOTE — PROGRESS NOTES
Lumbar Medial Branch Blockade  [facility]        PREOPERATIVE DIAGNOSIS:  Lumbar spondylosis without myelopathy    POSTOPERATIVE DIAGNOSIS:  Lumbar spondylosis without myelopathy    PROCEDURE:   Diagnostic left  Lumbar Medial Branch Nerve Blockades, with fluoroscopy:   L3, L4 and L5 nerve levels    PRE-PROCEDURE DISCUSSION WITH PATIENT:    Risks and complications were discussed with the patient prior to starting the procedure and informed consent was obtained.      SURGEON:  Raman Barahona MD    REASON FOR PROCEDURE:    She has an axial type left-sided low back pain, and this pain is confirmed to be over the L4 5 and L5-S1 facet joints on examination fluoroscopy.  She has tenderness of the facet joints on that exam under fluoroscopy.  She also has some pain on extension and with lumbar loading of the lumbar spine.    SEDATION:  Versed 4mg IV      ANESTHETIC:  Marcaine 0.5%  STEROID:  Depomedrol 30mg  TOTAL VOLUME OF SOLUTION:  3 mL    DESCRIPTON OF PROCEDURE:  After obtaining informed consent, IV access was obtained in the preoperative area.   The patient was taken to the operating room.  The patient was placed in the prone position with a pillow under the abdomen. All pressure points were well padded.  EKG, blood pressure, and pulse oximeter were monitored.  The patient was monitored and sedated by the RN under my direction. The lumbosacral area was prepped with Chloraprep and draped in a sterile fashion.     Under fluoroscopic guidance the vertebral  transverse processes areas at the appropriate vertebral levels were located, and points were identified at the junctions of the superior articular processes with the superior articular processes.     Skin and subcutaneous tissue were anesthetized with 1% lidocaine above each of these points. A 22-gauge spinal needle was introduced under fluoroscopic guidance at the above junctions. Aspiration was negative for blood and CSF.  After confirming  the position of the needle with fluoroscope in all views, 0.25 mL of Omnipaque was injected, and after seeing the proper spread a total of 1 mL of the anesthetic solution noted above was injected at each of these points.  Needles were removed intact from each of the areas.  Onset of analgesia was noted.  Vital signs remained stable throughout.      ESTIMATED BLOOD LOSS:  <5 mL  SPECIMENS:  none    COMPLICATIONS:   No complications were noted. and There was no indication of vascular uptake on live injection of contrast dye.    TOLERANCE & DISCHARGE CONDITION:    The patient tolerated the procedure well.  The patient was transported to the recovery area without difficulties.  The patient was discharged to home under the care of family in stable and satisfactory condition.    PLAN OF CARE:  1. The patient was given our standard instruction sheet.  2. We discussed that Lumbar Medial Branch Blockade is a diagnostic procedure in consideration for radiofrequency ablation if two diagnostic procedures prove to be positive for significant benefit.  If sustained relief of 6 to eight weeks is obtained, then an alternative plan could be therapeutic lumbar branch blockades.  3. The patient is asked to keep a pain log each hour for 8 hours after the procedure today.  4. The patient will  Return to clinic 1 wk.  5. The patient will resume all medications as per the medication reconciliation sheet.

## 2017-08-08 ENCOUNTER — OFFICE VISIT (OUTPATIENT)
Dept: PAIN MEDICINE | Facility: CLINIC | Age: 52
End: 2017-08-08

## 2017-08-08 VITALS
WEIGHT: 151 LBS | BODY MASS INDEX: 22.88 KG/M2 | HEIGHT: 68 IN | RESPIRATION RATE: 18 BRPM | OXYGEN SATURATION: 96 % | SYSTOLIC BLOOD PRESSURE: 119 MMHG | HEART RATE: 88 BPM | TEMPERATURE: 97.3 F | DIASTOLIC BLOOD PRESSURE: 79 MMHG

## 2017-08-08 DIAGNOSIS — M47.816 LUMBAR FACET ARTHROPATHY: ICD-10-CM

## 2017-08-08 DIAGNOSIS — M51.36 BULGE OF LUMBAR DISC WITHOUT MYELOPATHY: Primary | ICD-10-CM

## 2017-08-08 DIAGNOSIS — M71.38 SYNOVIAL CYST OF LUMBAR FACET JOINT: ICD-10-CM

## 2017-08-08 PROCEDURE — 99213 OFFICE O/P EST LOW 20 MIN: CPT | Performed by: NURSE PRACTITIONER

## 2017-08-08 NOTE — PROGRESS NOTES
CHIEF COMPLAINT  Back pain has decreased since procedure      Subjective   Meenakshi Alcala is a 52 y.o. female  who presents to the office for follow-up of procedure.  She completed a Lumbar MBB Left L3-L5 FJN  on  8/1/17 performed by Dr. Barahona for management of low back pain. Patient reports significant relief from the procedure for the first 2 days.  She is still noticing 30-40% relief currently.    Patient was initially evaluated as an inpatient on 7/10/17.  She was seen for left low back pain.  At her initial office visit she was ordered to have left L4-S1 FJN.  She was also found have a left L5-S1 facet cyst.    Complains of pain in her left low back. Today her pain is 5/10VAS. Describes the pain as constantly being there. Does notice improvement with movement and pain is not as severe. ADL's by self. Continues with medication as previously prescribed.     Back Pain   This is a chronic problem. The current episode started more than 1 year ago. The problem occurs constantly. The pain is present in the sacro-iliac. The quality of the pain is described as aching. The pain radiates to the left knee and left foot. The pain is at a severity of 5/10. The pain is severe. The pain is the same all the time. The symptoms are aggravated by bending, position, standing, sitting and twisting. Associated symptoms include numbness and weakness. Pertinent negatives include no bladder incontinence, bowel incontinence, chest pain, dysuria, fever or headaches. Risk factors include history of cancer. She has tried analgesics, bed rest, heat, home exercises, muscle relaxant and NSAIDs (FJN-- significant temporary relief.) for the symptoms. The treatment provided mild relief.        PEG Assessment   What number best describes your pain on average in the past week?5  What number best describes how, during the past week, pain has interfered with your enjoyment of life?1  What number best describes how, during the past week, pain has  "interfered with your general activity?  2    The following portions of the patient's history were reviewed and updated as appropriate: allergies, current medications, past family history, past medical history, past social history, past surgical history and problem list.    Review of Systems   Constitutional: Positive for chills. Negative for fever.   Respiratory: Negative for shortness of breath.    Cardiovascular: Negative for chest pain.   Gastrointestinal: Negative for bowel incontinence, constipation, diarrhea, nausea and vomiting.   Genitourinary: Negative for bladder incontinence, difficulty urinating, dyspareunia and dysuria.   Musculoskeletal: Positive for back pain.   Neurological: Positive for weakness and numbness. Negative for dizziness, light-headedness and headaches.   Psychiatric/Behavioral: Negative for confusion, hallucinations, self-injury, sleep disturbance and suicidal ideas. The patient is not nervous/anxious.        Vitals:    08/08/17 1153   BP: 119/79   Pulse: 88   Resp: 18   Temp: 97.3 °F (36.3 °C)   SpO2: 96%   Weight: 151 lb (68.5 kg)   Height: 68\" (172.7 cm)   PainSc:   5   PainLoc: Back     Objective   Physical Exam   Constitutional: She is oriented to person, place, and time. Vital signs are normal. She appears well-developed and well-nourished. She is cooperative.   HENT:   Head: Normocephalic and atraumatic.   Nose: Nose normal.   Eyes: Conjunctivae and lids are normal.   Neck: Trachea normal. Neck supple.   Cardiovascular: Normal rate, regular rhythm and normal heart sounds.    Pulmonary/Chest: Effort normal and breath sounds normal. No respiratory distress.   Abdominal: Normal appearance.   Musculoskeletal:        Lumbar back: She exhibits tenderness, bony tenderness (moderate tenderness to palpation of left L4-S1 facets.) and pain.   Negative SLR bilaterally    + lumbar facet loading maneuver    Negative bilateral SI joint tenderness   Neurological: She is alert and oriented to " person, place, and time. Gait normal.   Reflex Scores:       Patellar reflexes are 2+ on the right side and 2+ on the left side.  Skin: Skin is warm, dry and intact.   Psychiatric: She has a normal mood and affect. Her speech is normal and behavior is normal. Judgment and thought content normal. Cognition and memory are normal.   Nursing note and vitals reviewed.      Assessment/Plan   Meenakshi was seen today for back pain.    Diagnoses and all orders for this visit:    Bulge of lumbar disc without myelopathy    Lumbar facet arthropathy  -     Case Request    Synovial cyst of lumbar facet joint  -     Case Request      --- The urine drug screen confirmation from 7-10-17 has been reviewed and the result is ABNORMAL(Presence of THC; presence of tramadol is appropriate) based on patient history and ROMULO report  --- Repeat left L3L5 FJN. No blood thinners. Reviewed the procedure at length with the patient.  Included in the review was expectations, complications, risk and benefits.The procedure was described in detail and the risks, benefits and alternatives were discussed with the patient (including but not limited to: bleeding, infection, nerve damage, worsening of pain, inability to perform injection, paralysis, seizures, and death) who agreed to proceed.  Discussed the potential for sedation if warranted/wanted.  Questions were answered and in a way the patient could understand.  Patient verbalized understanding and wishes to proceed.  This intervention will be ordered.  --- Follow-up after procedure or sooner if needed.    -------  Education about Medial Branch Blockade and RF Therapy:    This medial branch blockade (MBB) suggested is intended for diagnostic purposes, with the intent of offering the patient Radiofrequency thermal rhizotomy (RF) if the MBB is diagnostically effective.  The diagnostic blockade is necessary to determine the likelihood that RF therapy could be efficacious in providing long term relief  "to the patient.    Medial branches are sensory nerve branches that connect to a facet joint and transmit sensations & pain signals from that joint.  Facet is a term for the type of joints found in the spine.  Medial branches are the nerves that go to a facet, and therefore are also sometimes called \"facet joint nerves\" (FJNs).      In a medial branch blockade procedure, xray fluoroscopy is used to verify the locations of the outside of the joint lines which are being targeted.  Under xray guidance, needles are placed to these areas.  Contrast dye is injected to confirm proper placement, with dye flowing over the joint area, and to ensure that the dye does not flow into unintended areas such as a vein.  When this is confirmed, local anesthetic is injected to block the medial branch at that joint level.      If MBBs are diagnostically successful in blocking pain, then the patient is most likely a great candidate for Radiofrequency of those facet joint nerves.  In the RF procedure, needles are placed to the joint lines in the same fashion, and after testing, the needle tips are heated to thermally treat the nerves, blocking the nerves by in essence damaging the nerves with the heat treatment.       Medically, a successful RF procedure should provide a patient with 50% pain relief or more for at least 6 months.  Clinical experience suggests that successful patients receive relief more in the range of 12 months on average.  We also discussed that a fortunate minority of patients receive therapeutic success from the MBB, and may not require RF ablation.  If a patient receives more than 8 weeks of relief from MBB, then occasional repeat MBB for therapeutic purposes is a very reasonable alternative therapy.  This course of therapy is consistent with our LCDs according to our CMS  in the area, and therefore other insurance providers should follow accordingly.  We will monitor our patients to screen for these " therapeutic responders and will offer RF therapy only when necessary.        We discussed that MBB & RF are not without risks.  Guidelines regarding anticoagulant use & neuraxial procedures will be respected.  Patients that are ill or otherwise may be at risk for sepsis will not have their spines accessed by neuraxial injections of any type.  This patient will not be offered these therapies if there is an increased risk.   We discussed that there is a risk of postprocedural pain and also a risk of worsening of clinical picture with these procedures as with any neuraxial procedure.    -------     ROMULO REPORT      ROMULO report has been reviewed and scanned into the patient's chart.    Date of last ROMULO : 8-4-17        EMR Dragon/Transcription disclaimer:   Much of this encounter note is an electronic transcription/translation of spoken language to printed text. The electronic translation of spoken language may permit erroneous, or at times, nonsensical words or phrases to be inadvertently transcribed; Although I have reviewed the note for such errors, some may still exist.

## 2017-08-24 ENCOUNTER — OUTSIDE FACILITY SERVICE (OUTPATIENT)
Dept: PAIN MEDICINE | Facility: CLINIC | Age: 52
End: 2017-08-24

## 2017-08-24 ENCOUNTER — DOCUMENTATION (OUTPATIENT)
Dept: PAIN MEDICINE | Facility: CLINIC | Age: 52
End: 2017-08-24

## 2017-08-24 PROCEDURE — 64493 INJ PARAVERT F JNT L/S 1 LEV: CPT | Performed by: ANESTHESIOLOGY

## 2017-08-24 PROCEDURE — 64494 INJ PARAVERT F JNT L/S 2 LEV: CPT | Performed by: ANESTHESIOLOGY

## 2017-08-25 NOTE — PROGRESS NOTES
Lumbar Medial Branch Blockade  [facility]       PREOPERATIVE DIAGNOSIS:  Lumbar spondylosis without myelopathy     POSTOPERATIVE DIAGNOSIS:  Lumbar spondylosis without myelopathy     PROCEDURE:   Diagnostic left  Lumbar Medial Branch Nerve Blockades, with fluoroscopy:   L3, L4 and L5 nerve levels     PRE-PROCEDURE DISCUSSION WITH PATIENT:    Risks and complications were discussed with the patient prior to starting the procedure and informed consent was obtained.       SURGEON:  Raman Barahona MD     REASON FOR PROCEDURE:   Previous injection at the same levels on the left were diagnostically positive.   She has an axial type left-sided low back pain, and this pain is confirmed to be over the L4 5 and L5-S1 facet joints on examination fluoroscopy.  She has tenderness of the facet joints on that exam under fluoroscopy.  She also has some pain on extension and with lumbar loading of the lumbar spine.     SEDATION:  Versed 4mg IV        ANESTHETIC:  Marcaine 0.5%  STEROID:  Depomedrol 30mg  TOTAL VOLUME OF SOLUTION:  3 mL     DESCRIPTON OF PROCEDURE:  After obtaining informed consent, IV access was obtained in the preoperative area.   The patient was taken to the operating room.  The patient was placed in the prone position with a pillow under the abdomen. All pressure points were well padded.  EKG, blood pressure, and pulse oximeter were monitored.  The patient was monitored and sedated by the RN under my direction. The lumbosacral area was prepped with Chloraprep and draped in a sterile fashion.      Under fluoroscopic guidance the vertebral  transverse processes areas at the appropriate vertebral levels were located, and points were identified at the junctions of the superior articular processes with the superior articular processes.      Skin and subcutaneous tissue were anesthetized with 1% lidocaine above each of these points. A 22-gauge spinal needle was introduced under fluoroscopic  guidance at the above junctions. Aspiration was negative for blood and CSF.  After confirming the position of the needle with fluoroscope in all views, 0.25 mL of Omnipaque was injected, and after seeing the proper spread a total of 1 mL of the anesthetic solution noted above was injected at each of these points.  Needles were removed intact from each of the areas.  Onset of analgesia was noted.  Vital signs remained stable throughout.       ESTIMATED BLOOD LOSS:  <5 mL  SPECIMENS:  none     COMPLICATIONS:   No complications were noted. and There was no indication of vascular uptake on live injection of contrast dye.     TOLERANCE & DISCHARGE CONDITION:    The patient tolerated the procedure well.  The patient was transported to the recovery area without difficulties.  The patient was discharged to home under the care of family in stable and satisfactory condition.     PLAN OF CARE:  1. The patient was given our standard instruction sheet.  2. We discussed that Lumbar Medial Branch Blockade is a diagnostic procedure in consideration for radiofrequency ablation if two diagnostic procedures prove to be positive for significant benefit.  If sustained relief of 6 to eight weeks is obtained, then an alternative plan could be therapeutic lumbar branch blockades.  3. The patient is asked to keep a pain log each hour for 8 hours after the procedure today.  4. The patient will  Return to clinic 2 wk.  5. The patient will resume all medications as per the medication reconciliation sheet.

## 2017-09-08 ENCOUNTER — OFFICE VISIT (OUTPATIENT)
Dept: INTERNAL MEDICINE | Facility: CLINIC | Age: 52
End: 2017-09-08

## 2017-09-08 VITALS
OXYGEN SATURATION: 100 % | DIASTOLIC BLOOD PRESSURE: 60 MMHG | BODY MASS INDEX: 23.26 KG/M2 | WEIGHT: 153 LBS | HEART RATE: 85 BPM | SYSTOLIC BLOOD PRESSURE: 140 MMHG

## 2017-09-08 DIAGNOSIS — R22.1 NODULE OF NECK: Primary | ICD-10-CM

## 2017-09-08 DIAGNOSIS — M54.2 NECK PAIN: ICD-10-CM

## 2017-09-08 PROCEDURE — 99214 OFFICE O/P EST MOD 30 MIN: CPT | Performed by: INTERNAL MEDICINE

## 2017-09-08 NOTE — PROGRESS NOTES
Chief Complaint   Patient presents with   • Cyst     on back of neck in the  middle    neck pain for two weeks. Noted nodule in the back of her neck.     History of Present Illness   Meenakshi Alcala is a 52 y.o. female presents for acute care. She reports that for the last 2 weeks she has felt some neck discomfort. Tried tramadol and flexeril without much benefit. She has a protrusion that she noted at that time. History of breast cancer. Had mri lumbar spine last year for back pain. She is thought to have a synovial cyst there. She did respond very well to epidural injection on the second injection.   bp is up. She is not taking amlodipine at this time.     The following portions of the patient's history were reviewed and updated as appropriate: allergies, current medications, past family history, past medical history, past social history, past surgical history and problem list.  Current Outpatient Prescriptions on File Prior to Visit   Medication Sig Dispense Refill   • albuterol (PROVENTIL HFA;VENTOLIN HFA) 108 (90 BASE) MCG/ACT inhaler Inhale 2 puffs Every 6 (Six) Hours. 1 inhaler 6   • amLODIPine (NORVASC) 2.5 MG tablet Take 1 tablet by mouth nightly.     • cyclobenzaprine (FLEXERIL) 10 MG tablet Take 1 tablet by mouth 3 (Three) Times a Day As Needed for Muscle Spasms. 30 tablet 3   • DEXILANT 60 MG capsule TAKE ONE CAPSULE BY MOUTH DAILY 90 capsule 1   • fluticasone (FLONASE) 50 MCG/ACT nasal spray PLACE TWO SPRAYS IN EACH NOSTRIL ONCE DAILY 16 each 3   • LINZESS 145 MCG capsule TAKE ONE CAPSULE BY MOUTH DAILY 90 capsule 1   • meloxicam (MOBIC) 15 MG tablet Take 1 tablet by mouth Daily. 30 tablet 1   • sertraline (ZOLOFT) 100 MG tablet Take 1 tablet by mouth Daily. 90 tablet 2   • traMADol (ULTRAM) 50 MG tablet Take 1 tablet by mouth Every 6 (Six) Hours As Needed for Moderate Pain (4-6). 30 tablet 3   • cloNIDine (CATAPRES) 0.3 MG tablet Take 1 tablet by mouth daily.     • diclofenac (VOLTAREN) 1 % gel gel  Apply 4 g topically 4 (Four) Times a Day. 100 g 6     No current facility-administered medications on file prior to visit.      Review of Systems   Constitutional: Negative.    HENT: Negative.    Eyes: Negative.    Respiratory: Negative.    Cardiovascular: Negative.    Gastrointestinal: Negative.    Endocrine: Negative.    Genitourinary: Negative.    Musculoskeletal:        Neck discomfort.   Back pain has resolved.    Skin: Negative.    Allergic/Immunologic: Negative.    Neurological: Negative.    Hematological: Negative.    Psychiatric/Behavioral: Negative.        Objective   Physical Exam   Constitutional: She is oriented to person, place, and time. She appears well-developed and well-nourished.   HENT:   Head: Normocephalic and atraumatic.   Right Ear: External ear normal.   Left Ear: External ear normal.   Nose: Nose normal.   Mouth/Throat: Oropharynx is clear and moist.   Eyes: EOM are normal. Pupils are equal, round, and reactive to light.   Neck: Neck supple.   Cardiovascular: Normal rate, regular rhythm and normal heart sounds.    Pulmonary/Chest: Effort normal and breath sounds normal. No respiratory distress.   Abdominal: Soft.   Musculoskeletal: Normal range of motion.   Neurological: She is alert and oriented to person, place, and time.   Skin: Skin is warm and dry.   Psychiatric: She has a normal mood and affect. Her behavior is normal.   Nursing note and vitals reviewed.       /60  Pulse 85  Wt 153 lb (69.4 kg)  SpO2 100%  BMI 23.26 kg/m2    Assessment/Plan   Diagnoses and all orders for this visit:    Nodule of neck  -     CT soft tissue neck wo contrast; Future    Neck pain      Patient w/ neck discomfort. Has full rom so does not appear to be musculoskeletal or neuro. She has a nodule notable at the mid cervical spine. Will get a ct scan to further evaluate this. May need an mri as well. Gentle stretching. Prn tramadol, meloxicam, and flexeril. bp is elevated she will start amlodipine 1  daily.

## 2017-09-14 ENCOUNTER — TELEPHONE (OUTPATIENT)
Dept: INTERNAL MEDICINE | Facility: CLINIC | Age: 52
End: 2017-09-14

## 2017-09-14 NOTE — TELEPHONE ENCOUNTER
Hospital (Radiology) said that the radiologist likes to do CT neck with contrast. He is asking that you change the order or if there is a reason why your not wanting contrast.

## 2017-09-15 ENCOUNTER — HOSPITAL ENCOUNTER (OUTPATIENT)
Dept: CT IMAGING | Facility: HOSPITAL | Age: 52
Discharge: HOME | End: 2017-09-15
Admitting: INTERNAL MEDICINE

## 2017-09-15 DIAGNOSIS — R22.1 NODULE OF NECK: ICD-10-CM

## 2017-09-15 DIAGNOSIS — M54.2 NECK PAIN: Primary | ICD-10-CM

## 2017-09-15 DIAGNOSIS — M54.2 NECK PAIN: ICD-10-CM

## 2017-09-15 LAB — CREAT BLDA-MCNC: 0.7 MG/DL (ref 0.6–1.3)

## 2017-09-15 PROCEDURE — 70491 CT SOFT TISSUE NECK W/DYE: CPT

## 2017-09-15 PROCEDURE — 0 IOPAMIDOL 61 % SOLUTION: Performed by: INTERNAL MEDICINE

## 2017-09-15 PROCEDURE — 82565 ASSAY OF CREATININE: CPT

## 2017-09-15 RX ADMIN — IOPAMIDOL 75 ML: 612 INJECTION, SOLUTION INTRAVENOUS at 13:14

## 2017-10-09 ENCOUNTER — OFFICE VISIT (OUTPATIENT)
Dept: INTERNAL MEDICINE | Facility: CLINIC | Age: 52
End: 2017-10-09

## 2017-10-09 VITALS
DIASTOLIC BLOOD PRESSURE: 66 MMHG | HEART RATE: 98 BPM | WEIGHT: 154 LBS | BODY MASS INDEX: 23.42 KG/M2 | SYSTOLIC BLOOD PRESSURE: 136 MMHG | OXYGEN SATURATION: 98 %

## 2017-10-09 DIAGNOSIS — R23.2 HOT FLASHES: ICD-10-CM

## 2017-10-09 DIAGNOSIS — M54.2 NECK PAIN: Primary | ICD-10-CM

## 2017-10-09 DIAGNOSIS — Z00.00 HEALTHCARE MAINTENANCE: ICD-10-CM

## 2017-10-09 DIAGNOSIS — F41.9 ANXIETY: ICD-10-CM

## 2017-10-09 DIAGNOSIS — G62.9 NEUROPATHY: ICD-10-CM

## 2017-10-09 DIAGNOSIS — C50.411 MALIGNANT NEOPLASM OF UPPER-OUTER QUADRANT OF RIGHT FEMALE BREAST, UNSPECIFIED ESTROGEN RECEPTOR STATUS (HCC): ICD-10-CM

## 2017-10-09 PROCEDURE — 99214 OFFICE O/P EST MOD 30 MIN: CPT | Performed by: INTERNAL MEDICINE

## 2017-10-09 RX ORDER — AMLODIPINE BESYLATE 5 MG/1
5 TABLET ORAL DAILY
Qty: 90 TABLET | Refills: 2 | Status: SHIPPED | OUTPATIENT
Start: 2017-10-09 | End: 2018-11-05 | Stop reason: SDUPTHER

## 2017-10-09 NOTE — PROGRESS NOTES
Chief Complaint   Patient presents with   • Hypertension   neck pain, htn,     History of Present Illness   Meenakshi Alcala is a 52 y.o. female presents for follow up evaluation w/ acute needs. She has neck pain. Have referred to PT but this was cost prohibitive. She has tried tramadol, meloxicam, and flexeril for pain and pain does persist but provides some relief. She had more tolerable pain for about a week but then it came back.   Patient has hypertension.  bp has been normotensive.   Has anxiety that is well managed with zoloft.     The following portions of the patient's history were reviewed and updated as appropriate: allergies, current medications, past family history, past medical history, past social history, past surgical history and problem list.  Current Outpatient Prescriptions on File Prior to Visit   Medication Sig Dispense Refill   • albuterol (PROVENTIL HFA;VENTOLIN HFA) 108 (90 BASE) MCG/ACT inhaler Inhale 2 puffs Every 6 (Six) Hours. 1 inhaler 6   • cyclobenzaprine (FLEXERIL) 10 MG tablet Take 1 tablet by mouth 3 (Three) Times a Day As Needed for Muscle Spasms. 30 tablet 3   • DEXILANT 60 MG capsule TAKE ONE CAPSULE BY MOUTH DAILY 90 capsule 1   • fluticasone (FLONASE) 50 MCG/ACT nasal spray PLACE TWO SPRAYS IN EACH NOSTRIL ONCE DAILY 16 each 3   • LINZESS 145 MCG capsule TAKE ONE CAPSULE BY MOUTH DAILY 90 capsule 1   • meloxicam (MOBIC) 15 MG tablet Take 1 tablet by mouth Daily. 30 tablet 1   • sertraline (ZOLOFT) 100 MG tablet Take 1 tablet by mouth Daily. 90 tablet 2   • traMADol (ULTRAM) 50 MG tablet Take 1 tablet by mouth Every 6 (Six) Hours As Needed for Moderate Pain (4-6). 30 tablet 3   • [DISCONTINUED] amLODIPine (NORVASC) 2.5 MG tablet Take 1 tablet by mouth nightly.     • [DISCONTINUED] cloNIDine (CATAPRES) 0.3 MG tablet Take 1 tablet by mouth daily.     • diclofenac (VOLTAREN) 1 % gel gel Apply 4 g topically 4 (Four) Times a Day. 100 g 6     No current facility-administered  medications on file prior to visit.      Review of Systems   Constitutional: Negative.    HENT: Negative.    Eyes: Negative.    Respiratory: Negative.    Cardiovascular: Negative.    Endocrine: Negative.    Genitourinary: Negative.    Musculoskeletal: Positive for neck pain.   Skin: Negative.    Allergic/Immunologic: Negative.    Neurological:        Neuropathy   Psychiatric/Behavioral: Negative.         H/o anxiety. Mood is stable today on zoloft.        Objective   Physical Exam   Constitutional: She is oriented to person, place, and time. She appears well-developed and well-nourished.   HENT:   Head: Normocephalic and atraumatic.   Right Ear: External ear normal.   Left Ear: External ear normal.   Nose: Nose normal.   Mouth/Throat: Oropharynx is clear and moist.   Eyes: EOM are normal. Pupils are equal, round, and reactive to light.   Neck: Normal range of motion. Neck supple.   Discomfort w/ rom movements   Cardiovascular: Normal rate, regular rhythm and normal heart sounds.    Pulmonary/Chest: Effort normal and breath sounds normal. No respiratory distress.   Abdominal: Soft.   Musculoskeletal: Normal range of motion.   Prominent spinal process but no mass.    Neurological: She is alert and oriented to person, place, and time.   Skin: Skin is warm and dry.   Psychiatric: She has a normal mood and affect. Her behavior is normal. Judgment and thought content normal.   Nursing note and vitals reviewed.       /66  Pulse 98  Wt 154 lb (69.9 kg)  SpO2 98%  BMI 23.42 kg/m2    Assessment/Plan   Diagnoses and all orders for this visit:    Neck pain  -     MRI cervical spine w wo contrast; Future  -     Basic Metabolic Panel  -     TSH  -     Ambulatory Referral to Pain Management    Malignant neoplasm of upper-outer quadrant of right female breast, unspecified estrogen receptor status  -     MRI cervical spine w wo contrast; Future  -     Basic Metabolic Panel  -     TSH    Hot flashes  -     [facility]  maintenance  -     Hepatitis C Antibody  -     Basic Metabolic Panel  -     TSH    Neuropathy  -     TSH    Anxiety  -     TSH    Other orders  -     amLODIPine (NORVASC) 5 MG tablet; Take 1 tablet by mouth Daily.      Patient w/ hypertension. Will increase norvasc to 5 mg daily and monitor. She will continue neck rom exercises. Will refer for mri. She will f/u w/ pain management. Will test tsh given hot flash and occasional fatigue. She will have listed labs tested. She will follow up routinely.

## 2017-10-19 ENCOUNTER — HOSPITAL ENCOUNTER (OUTPATIENT)
Dept: MRI IMAGING | Facility: HOSPITAL | Age: 52
Discharge: HOME | End: 2017-10-19
Admitting: INTERNAL MEDICINE

## 2017-10-19 DIAGNOSIS — C50.411 MALIGNANT NEOPLASM OF UPPER-OUTER QUADRANT OF RIGHT FEMALE BREAST, UNSPECIFIED ESTROGEN RECEPTOR STATUS (HCC): ICD-10-CM

## 2017-10-19 DIAGNOSIS — M54.2 NECK PAIN: ICD-10-CM

## 2017-10-19 PROCEDURE — 82565 ASSAY OF CREATININE: CPT

## 2017-10-19 PROCEDURE — 72156 MRI NECK SPINE W/O & W/DYE: CPT

## 2017-10-19 PROCEDURE — 0 GADOBENATE DIMEGLUMINE 529 MG/ML SOLUTION: Performed by: INTERNAL MEDICINE

## 2017-10-19 PROCEDURE — A9577 INJ MULTIHANCE: HCPCS | Performed by: INTERNAL MEDICINE

## 2017-10-19 RX ADMIN — GADOBENATE DIMEGLUMINE 15 ML: 529 INJECTION, SOLUTION INTRAVENOUS at 17:43

## 2017-10-20 LAB — CREAT BLDA-MCNC: 0.6 MG/DL (ref 0.6–1.3)

## 2017-10-26 ENCOUNTER — LAB (OUTPATIENT)
Dept: LAB | Facility: HOSPITAL | Age: 52
End: 2017-10-26

## 2017-10-26 ENCOUNTER — OFFICE VISIT (OUTPATIENT)
Dept: ONCOLOGY | Facility: CLINIC | Age: 52
End: 2017-10-26

## 2017-10-26 VITALS
BODY MASS INDEX: 23.54 KG/M2 | RESPIRATION RATE: 12 BRPM | OXYGEN SATURATION: 99 % | DIASTOLIC BLOOD PRESSURE: 84 MMHG | HEIGHT: 67 IN | SYSTOLIC BLOOD PRESSURE: 112 MMHG | HEART RATE: 74 BPM | WEIGHT: 150 LBS | TEMPERATURE: 98.5 F

## 2017-10-26 DIAGNOSIS — C50.411 MALIGNANT NEOPLASM OF UPPER-OUTER QUADRANT OF RIGHT BREAST IN FEMALE, ESTROGEN RECEPTOR NEGATIVE (HCC): Primary | ICD-10-CM

## 2017-10-26 DIAGNOSIS — R31.9 HEMATURIA, UNSPECIFIED TYPE: ICD-10-CM

## 2017-10-26 DIAGNOSIS — Z17.1 MALIGNANT NEOPLASM OF UPPER-OUTER QUADRANT OF RIGHT BREAST IN FEMALE, ESTROGEN RECEPTOR NEGATIVE (HCC): Primary | ICD-10-CM

## 2017-10-26 DIAGNOSIS — C50.411 MALIGNANT NEOPLASM OF UPPER-OUTER QUADRANT OF RIGHT FEMALE BREAST, UNSPECIFIED ESTROGEN RECEPTOR STATUS (HCC): Primary | ICD-10-CM

## 2017-10-26 LAB
ALBUMIN SERPL-MCNC: 4.4 G/DL (ref 3.5–5.2)
ALBUMIN/GLOB SERPL: 1.6 G/DL (ref 1.1–2.4)
ALP SERPL-CCNC: 111 U/L (ref 38–116)
ALT SERPL W P-5'-P-CCNC: 15 U/L (ref 0–33)
ANION GAP SERPL CALCULATED.3IONS-SCNC: 10.7 MMOL/L
AST SERPL-CCNC: 18 U/L (ref 0–32)
BASOPHILS # BLD AUTO: 0.04 10*3/MM3 (ref 0–0.1)
BASOPHILS NFR BLD AUTO: 0.7 % (ref 0–1.1)
BILIRUB SERPL-MCNC: 0.3 MG/DL (ref 0.1–1.2)
BUN BLD-MCNC: 10 MG/DL (ref 6–20)
BUN/CREAT SERPL: 18.5 (ref 7.3–30)
CALCIUM SPEC-SCNC: 9.9 MG/DL (ref 8.5–10.2)
CHLORIDE SERPL-SCNC: 104 MMOL/L (ref 98–107)
CO2 SERPL-SCNC: 29.3 MMOL/L (ref 22–29)
CREAT BLD-MCNC: 0.54 MG/DL (ref 0.6–1.1)
DEPRECATED RDW RBC AUTO: 46.2 FL (ref 37–49)
EOSINOPHIL # BLD AUTO: 0.07 10*3/MM3 (ref 0–0.36)
EOSINOPHIL NFR BLD AUTO: 1.2 % (ref 1–5)
ERYTHROCYTE [DISTWIDTH] IN BLOOD BY AUTOMATED COUNT: 14.3 % (ref 11.7–14.5)
GFR SERPL CREATININE-BSD FRML MDRD: 143 ML/MIN/1.73
GLOBULIN UR ELPH-MCNC: 2.8 GM/DL (ref 1.8–3.5)
GLUCOSE BLD-MCNC: 89 MG/DL (ref 74–124)
HCT VFR BLD AUTO: 38.8 % (ref 34–45)
HGB BLD-MCNC: 12.4 G/DL (ref 11.5–14.9)
IMM GRANULOCYTES # BLD: 0.02 10*3/MM3 (ref 0–0.03)
IMM GRANULOCYTES NFR BLD: 0.3 % (ref 0–0.5)
LYMPHOCYTES # BLD AUTO: 2.08 10*3/MM3 (ref 1–3.5)
LYMPHOCYTES NFR BLD AUTO: 35.2 % (ref 20–49)
MCH RBC QN AUTO: 28 PG (ref 27–33)
MCHC RBC AUTO-ENTMCNC: 32 G/DL (ref 32–35)
MCV RBC AUTO: 87.6 FL (ref 83–97)
MONOCYTES # BLD AUTO: 0.5 10*3/MM3 (ref 0.25–0.8)
MONOCYTES NFR BLD AUTO: 8.5 % (ref 4–12)
NEUTROPHILS # BLD AUTO: 3.2 10*3/MM3 (ref 1.5–7)
NEUTROPHILS NFR BLD AUTO: 54.1 % (ref 39–75)
NRBC BLD MANUAL-RTO: 0 /100 WBC (ref 0–0)
PLATELET # BLD AUTO: 265 10*3/MM3 (ref 150–375)
PMV BLD AUTO: 8.8 FL (ref 8.9–12.1)
POTASSIUM BLD-SCNC: 4.1 MMOL/L (ref 3.5–4.7)
PROT SERPL-MCNC: 7.2 G/DL (ref 6.3–8)
RBC # BLD AUTO: 4.43 10*6/MM3 (ref 3.9–5)
SODIUM BLD-SCNC: 144 MMOL/L (ref 134–145)
WBC NRBC COR # BLD: 5.91 10*3/MM3 (ref 4–10)

## 2017-10-26 PROCEDURE — 80053 COMPREHEN METABOLIC PANEL: CPT | Performed by: INTERNAL MEDICINE

## 2017-10-26 PROCEDURE — 99214 OFFICE O/P EST MOD 30 MIN: CPT | Performed by: INTERNAL MEDICINE

## 2017-10-26 PROCEDURE — 36415 COLL VENOUS BLD VENIPUNCTURE: CPT | Performed by: INTERNAL MEDICINE

## 2017-10-26 PROCEDURE — 85025 COMPLETE CBC W/AUTO DIFF WBC: CPT | Performed by: INTERNAL MEDICINE

## 2017-10-26 NOTE — PROGRESS NOTES
Subjective .     REASONS FOR FOLLOWUP:  Breast cancer    HISTORY OF PRESENT ILLNESS:  The patient is a 52 y.o. year old female  who is here for follow-up with the above-mentioned history.    No change in baseline nausea.  No change in baseline cervical pain for which she undergoes epidurals.    No new issues.  No weight loss.    She has a mole that is concerning.  She states it is changed over the past year.    Past Medical History:   Diagnosis Date   • Anemia    • H/O transfusion of packed red blood cells    • Headache    • Malignant neoplasm of breast 2013    Right, T2N1M0, Stage IIB       HEMATOLOGIC/ONCOLOGIC HISTORY:  (History from previous dates can be found in the separate document.)    MEDICATIONS    Current Outpatient Prescriptions:   •  albuterol (PROVENTIL HFA;VENTOLIN HFA) 108 (90 BASE) MCG/ACT inhaler, Inhale 2 puffs Every 6 (Six) Hours., Disp: 1 inhaler, Rfl: 6  •  amLODIPine (NORVASC) 5 MG tablet, Take 1 tablet by mouth Daily., Disp: 90 tablet, Rfl: 2  •  cyclobenzaprine (FLEXERIL) 10 MG tablet, Take 1 tablet by mouth 3 (Three) Times a Day As Needed for Muscle Spasms., Disp: 30 tablet, Rfl: 3  •  DEXILANT 60 MG capsule, TAKE ONE CAPSULE BY MOUTH DAILY, Disp: 90 capsule, Rfl: 1  •  diclofenac (VOLTAREN) 1 % gel gel, Apply 4 g topically 4 (Four) Times a Day., Disp: 100 g, Rfl: 6  •  fluticasone (FLONASE) 50 MCG/ACT nasal spray, PLACE TWO SPRAYS IN EACH NOSTRIL ONCE DAILY, Disp: 16 each, Rfl: 3  •  LINZESS 145 MCG capsule, TAKE ONE CAPSULE BY MOUTH DAILY, Disp: 90 capsule, Rfl: 1  •  meloxicam (MOBIC) 15 MG tablet, Take 1 tablet by mouth Daily., Disp: 30 tablet, Rfl: 1  •  sertraline (ZOLOFT) 100 MG tablet, Take 1 tablet by mouth Daily., Disp: 90 tablet, Rfl: 2  •  traMADol (ULTRAM) 50 MG tablet, Take 1 tablet by mouth Every 6 (Six) Hours As Needed for Moderate Pain (4-6)., Disp: 30 tablet, Rfl: 3    ALLERGIES:     Allergies   Allergen Reactions   • Erythromycin        SOCIAL HISTORY:       Social  "History     Social History   • Marital status:      Spouse name: N/A   • Number of children: N/A   • Years of education: College     Occupational History   • [employer]     Social History Main Topics   • Smoking status: Former Smoker     Packs/day: 1.00     Years: 25.00     Types: Cigarettes   • Smokeless tobacco: Former User   • Alcohol use Yes      Comment: Occasional   • Drug use: No   • Sexual activity: Not on file     Other Topics Concern   • Not on file     Social History Narrative         FAMILY HISTORY:  Family History   Problem Relation Age of Onset   • Heart disease Mother    • Hypertension Mother    • Stomach cancer Father    • Ovarian cancer Maternal Aunt    • Stomach cancer Maternal Aunt        REVIEW OF SYSTEMS:  GENERAL: No change in appetite or weight;   No fevers, chills, sweats.    SKIN: No nonhealing lesions.   No rashes.  HEME/LYMPH: No easy bruising, bleeding.   No swollen nodes.   EYES: No vision changes or diplopia.   ENT: No tinnitus, hearing loss, gum bleeding, epistaxis, hoarseness or dysphagia.   RESPIRATORY: No cough, shortness of breath, hemoptysis or wheezing.   CVS: No chest pain, palpitations, orthopnea, dyspnea on exertion or PND.   GI: see HPI  : No lower tract obstructive symptoms, dysuria or hematuria.   MUSCULOSKELETAL: see HPI  NEUROLOGICAL: No global weakness, loss of consciousness or seizures.   PSYCHIATRIC: No increased nervousness, mood changes or depression.       Objective    Vitals:    10/26/17 1538   BP: 112/84   Pulse: 74   Resp: 12   Temp: 98.5 °F (36.9 °C)   TempSrc: Oral   SpO2: 99%   Weight: 150 lb (68 kg)   Height: 67.32\" (171 cm)  Comment: new height   PainSc: 8  Comment: neck pain     Current Status 10/26/2017   ECOG score 0      PHYSICAL EXAM:    GENERAL:  Well-developed, well-nourished in no acute distress.   SKIN:  Warm, dry without rashes, purpura or petechiae.  HEAD:  Normocephalic.  EYES:  Pupils equal, round and reactive to " light.  EOMs intact.  Conjunctivae normal.  EARS:  Hearing intact.  NOSE:  Septum midline.  No excoriations or nasal discharge.  MOUTH:  Tongue is well-papillated; no stomatitis or ulcers.  Lips normal.  THROAT:  Oropharynx without lesions or exudates.  NECK:  Supple with good range of motion; no thyromegaly or masses, no JVD.  LYMPHATICS:  No cervical, supraclavicular, axillary or inguinal adenopathy.  CHEST:  Lungs clear to percussion and auscultation. Good airflow.  CARDIAC:  Regular rate and rhythm without murmurs, rubs or gallops. Normal S1,S2.  BREAST: no masses by palpation or inspection   ABDOMEN:  Soft, nontender with no organomegaly or masses.  EXTREMITIES:  No clubbing, cyanosis or edema.  NEUROLOGICAL:  Cranial Nerves II-XII grossly intact.  No focal neurological deficits.  PSYCHIATRIC:  Normal affect and mood.      RECENT LABS:        WBC   Date/Time Value Ref Range Status   10/26/2017 03:11 PM 5.91 4.00 - 10.00 10*3/mm3 Final     Hemoglobin   Date/Time Value Ref Range Status   10/26/2017 03:11 PM 12.4 11.5 - 14.9 g/dL Final     Platelets   Date/Time Value Ref Range Status   10/26/2017 03:11  150 - 375 10*3/mm3 Final       Assessment/Plan     ASSESSMENT:  1. T2N1M0 (stage IIB) invasive ductal carcinoma of the right breast. Esther score 9 of 9 (high grade), triple-negative, 3.3 cm tumor. Right mastectomy and TRAM flap reconstruction. Completed dose-dense Adriamycin/Cytoxan with dose-dense Taxol 07/11/2013. Completed radiation October 2013.   I reminded her today we would be quite unusual for her cancer to recur this late.  There is a good probability she is cured of this cancer.    2. Nausea.  IBS.  Managed by both Dr. Gallegos and Dr. Vandana Zavaleta.    3. Intermittent blurry vision, left eye more so than right eye.  She has seen her ophthalmologist for this.  She states nothing concerning was found.  She follows with Dr. Kiran Cabrera of neurology for this.  A prior MRI read as possible optic neuritis.   She states the follow-up MRI was fine.  She did not complain of this today.    4.  Weight loss.  Resolved.  165 pounds October 2014.  150 pounds April 2015.  145 pounds January 2016.  140 pounds February 2016.  136 pounds April 1, 2016.  132 pounds April 25, 2016.  Gained, up to 139 pounds, 5/9/16.  Weight is been stable since then.    5.  2 liver lesions on 1/31/13 staging CAT scan.  Liver protocol CT subsequently read as benign liver lesions.  Liver lesions unchanged on CT 5/4/16.    6.  She has an annual  for breast cancer.  She would like to donate the money raised to needy patients with breast cancer.  She does not want to go through a foundation.  She plans on calling our  to discuss options.    7.  Chronic sciatica and cervical pain.  Treatment through her PCP, Dr. Vandana Zavaleta.  Currently undergoing epidurals.    8.    She has a dark mole on her low back.  She states this is changed over the past year.  It is raised.  I doubt it is malignant, but since it has changed, we'll refer her to dermatology.  This was a new issue for me to address.    PLAN:   M.D. CBC CMP 6 months  Her port has been removed.   Last mammogram, 2/1/17, BI-RADS 2.  Referring dermatology.

## 2017-10-27 ENCOUNTER — TELEPHONE (OUTPATIENT)
Dept: ONCOLOGY | Facility: CLINIC | Age: 52
End: 2017-10-27

## 2017-12-18 RX ORDER — SERTRALINE HYDROCHLORIDE 100 MG/1
TABLET, FILM COATED ORAL
Qty: 90 TABLET | Refills: 1 | Status: SHIPPED | OUTPATIENT
Start: 2017-12-18 | End: 2018-07-10

## 2018-02-01 RX ORDER — ALBUTEROL SULFATE 90 UG/1
AEROSOL, METERED RESPIRATORY (INHALATION)
Qty: 1 INHALER | Refills: 5 | Status: SHIPPED | OUTPATIENT
Start: 2018-02-01 | End: 2019-02-07 | Stop reason: SDUPTHER

## 2018-02-01 RX ORDER — TRAMADOL HYDROCHLORIDE 50 MG/1
TABLET ORAL
Qty: 30 TABLET | Refills: 3 | Status: SHIPPED | OUTPATIENT
Start: 2018-02-01 | End: 2018-09-08 | Stop reason: SDUPTHER

## 2018-02-01 RX ORDER — CYCLOBENZAPRINE HCL 10 MG
TABLET ORAL
Qty: 30 TABLET | Refills: 2 | Status: SHIPPED | OUTPATIENT
Start: 2018-02-01 | End: 2018-08-17 | Stop reason: SDUPTHER

## 2018-02-19 ENCOUNTER — TELEPHONE (OUTPATIENT)
Dept: INTERNAL MEDICINE | Facility: CLINIC | Age: 53
End: 2018-02-19

## 2018-02-19 ENCOUNTER — OFFICE VISIT (OUTPATIENT)
Dept: INTERNAL MEDICINE | Facility: CLINIC | Age: 53
End: 2018-02-19

## 2018-02-19 VITALS
OXYGEN SATURATION: 99 % | SYSTOLIC BLOOD PRESSURE: 116 MMHG | WEIGHT: 156 LBS | DIASTOLIC BLOOD PRESSURE: 66 MMHG | HEART RATE: 99 BPM | BODY MASS INDEX: 24.2 KG/M2

## 2018-02-19 DIAGNOSIS — G47.00 INSOMNIA, UNSPECIFIED TYPE: ICD-10-CM

## 2018-02-19 DIAGNOSIS — H61.892 NODULE OF LEFT EXTERNAL EAR: Primary | ICD-10-CM

## 2018-02-19 DIAGNOSIS — J01.00 ACUTE NON-RECURRENT MAXILLARY SINUSITIS: ICD-10-CM

## 2018-02-19 PROCEDURE — 99214 OFFICE O/P EST MOD 30 MIN: CPT | Performed by: INTERNAL MEDICINE

## 2018-02-19 RX ORDER — FLUTICASONE PROPIONATE 50 MCG
2 SPRAY, SUSPENSION (ML) NASAL DAILY
Qty: 1 BOTTLE | Refills: 11 | Status: SHIPPED | OUTPATIENT
Start: 2018-02-19 | End: 2022-05-27 | Stop reason: SDUPTHER

## 2018-02-19 RX ORDER — AMOXICILLIN 875 MG/1
875 TABLET, COATED ORAL 2 TIMES DAILY
Qty: 20 TABLET | Refills: 0 | Status: SHIPPED | OUTPATIENT
Start: 2018-02-19 | End: 2018-03-20

## 2018-02-19 RX ORDER — TRAZODONE HYDROCHLORIDE 50 MG/1
50 TABLET ORAL NIGHTLY
Qty: 30 TABLET | Refills: 5 | Status: SHIPPED | OUTPATIENT
Start: 2018-02-19 | End: 2018-10-05 | Stop reason: SDUPTHER

## 2018-02-19 NOTE — PROGRESS NOTES
Chief Complaint   Patient presents with   • Jaw Pain     2 day   left ear nodule, sinus congestion, insomnia.     History of Present Illness   Meenakshi Alcala is a 52 y.o. female presents for acute care w/ new needs. She has sinus drainage and pressure. Worsening over the last week. Fairly miserable related to sinus congestion. She noticed yesterday a nodule medial to her left ear. She is also having a c/o insomnia. She is having trouble staying asleep. Some menopausal symptoms at night.     The following portions of the patient's history were reviewed and updated as appropriate: allergies, current medications, past family history, past medical history, past social history, past surgical history and problem list.  Current Outpatient Prescriptions on File Prior to Visit   Medication Sig Dispense Refill   • amLODIPine (NORVASC) 5 MG tablet Take 1 tablet by mouth Daily. 90 tablet 2   • cyclobenzaprine (FLEXERIL) 10 MG tablet TAKE ONE TABLET BY MOUTH THREE TIMES A DAY AS NEEDED FOR MUSCLE SPASMS 30 tablet 2   • DEXILANT 60 MG capsule TAKE ONE CAPSULE BY MOUTH DAILY 90 capsule 1   • diclofenac (VOLTAREN) 1 % gel gel Apply 4 g topically 4 (Four) Times a Day. 100 g 6   • fluticasone (FLONASE) 50 MCG/ACT nasal spray PLACE TWO SPRAYS IN EACH NOSTRIL ONCE DAILY 16 each 3   • LINZESS 145 MCG capsule TAKE ONE CAPSULE BY MOUTH DAILY 90 capsule 1   • meloxicam (MOBIC) 15 MG tablet Take 1 tablet by mouth Daily. 30 tablet 1   • sertraline (ZOLOFT) 100 MG tablet TAKE ONE TABLET BY MOUTH DAILY 90 tablet 1   • traMADol (ULTRAM) 50 MG tablet TAKE ONE TABLET BY MOUTH EVERY 6 HOURS AS NEEDED FOR MODERATE PAIN (4 TO 6 ON PAIN SCALE) 30 tablet 3   • VENTOLIN  (90 Base) MCG/ACT inhaler INHALE TWO PUFFS BY MOUTH EVERY 6 HOURS 1 inhaler 5     No current facility-administered medications on file prior to visit.      Review of Systems   Constitutional: Negative.    HENT: Positive for ear pain, postnasal drip, rhinorrhea, sinus pain,  sinus pressure, sneezing and voice change.    Eyes: Negative.    Respiratory: Negative.    Cardiovascular: Negative.    Gastrointestinal: Negative.    Endocrine: Negative.    Genitourinary: Negative.    Musculoskeletal: Negative.    Skin: Negative.    Allergic/Immunologic: Negative.    Neurological: Negative.    Hematological: Negative.    Psychiatric/Behavioral: Positive for sleep disturbance.       Objective   Physical Exam   Constitutional: She is oriented to person, place, and time. She appears well-developed and well-nourished.   HENT:   Head: Normocephalic and atraumatic.   Right Ear: External ear normal.   Left Ear: External ear normal.   Small nodule medial to left ear. Mobile. Nontender.   Left tm dull.   Pharyngeal erythema.   Sinus pressure/ discomfort max sinus   Eyes: EOM are normal. Pupils are equal, round, and reactive to light.   Neck: Neck supple.   Cardiovascular: Normal rate, regular rhythm and normal heart sounds.    Pulmonary/Chest: Effort normal and breath sounds normal. No respiratory distress.   Abdominal: Soft.   Musculoskeletal: Normal range of motion.   Neurological: She is alert and oriented to person, place, and time.   Skin: Skin is warm and dry.   Psychiatric: She has a normal mood and affect. Her behavior is normal. Judgment and thought content normal.   Nursing note and vitals reviewed.       /66  Pulse 99  Wt 70.8 kg (156 lb)  SpO2 99%  BMI 24.2 kg/m2    Assessment/Plan   Diagnoses and all orders for this visit:    Nodule of left external ear  -     US Head Neck Soft Tissue; Future    Acute non-recurrent maxillary sinusitis    Insomnia, unspecified type    Other orders  -     amoxicillin (AMOXIL) 875 MG tablet; Take 1 tablet by mouth 2 (Two) Times a Day.  -     fluticasone (FLONASE) 50 MCG/ACT nasal spray; 2 sprays into each nostril Daily for 30 days. Administer 2 sprays in each nostril for each dose.  -     traZODone (DESYREL) 50 MG tablet; Take 1 tablet by mouth Every  Night.      Patient w/ new nodule. Suspect lipoma or cyst. Will get ultrasound to further evaluate. She will be referred to ENT if this changes in nature or is suspicious on imaging. She has sinusitis and will give her amoxil for this. Insomnia. Will try trazodone one tab every evening. Follow up 2 mo as scheduled or prn if symptoms worsen or fail to improve.

## 2018-02-19 NOTE — TELEPHONE ENCOUNTER
Patient called and stated that she felt a lump on her face by her ear and jaw line. Patient states she is getting over a cold and was wondering if the lump could be mucus. She would like to know what Dr. Zavaleta suggests. Does Dr. Zavaleta want to work her in today or what does she suggest for patient.

## 2018-02-24 ENCOUNTER — HOSPITAL ENCOUNTER (OUTPATIENT)
Dept: ULTRASOUND IMAGING | Facility: HOSPITAL | Age: 53
Discharge: HOME | End: 2018-02-24
Admitting: INTERNAL MEDICINE

## 2018-02-24 DIAGNOSIS — H61.892 NODULE OF LEFT EXTERNAL EAR: ICD-10-CM

## 2018-02-24 PROCEDURE — 76536 US EXAM OF HEAD AND NECK: CPT

## 2018-03-01 ENCOUNTER — TELEPHONE (OUTPATIENT)
Dept: INTERNAL MEDICINE | Facility: CLINIC | Age: 53
End: 2018-03-01

## 2018-03-01 NOTE — TELEPHONE ENCOUNTER
A slightly enlarged lymph node is in this area. We should reexamine the patient in office in 2-3 weeks if it is still the same size or larger then a lymph node biopsy can be made.

## 2018-03-01 NOTE — TELEPHONE ENCOUNTER
Patient called and would like to know the results of her ultrasound she had on 02-24-18. The results are in the chart.  Best call back number [phone]

## 2018-03-20 ENCOUNTER — OFFICE VISIT (OUTPATIENT)
Dept: INTERNAL MEDICINE | Facility: CLINIC | Age: 53
End: 2018-03-20

## 2018-03-20 VITALS
BODY MASS INDEX: 24.04 KG/M2 | WEIGHT: 155 LBS | OXYGEN SATURATION: 99 % | DIASTOLIC BLOOD PRESSURE: 70 MMHG | SYSTOLIC BLOOD PRESSURE: 116 MMHG | HEART RATE: 83 BPM

## 2018-03-20 DIAGNOSIS — H61.892 NODULE OF LEFT EXTERNAL EAR: Primary | ICD-10-CM

## 2018-03-20 DIAGNOSIS — N95.1 MENOPAUSAL SYNDROME: ICD-10-CM

## 2018-03-20 PROCEDURE — 99214 OFFICE O/P EST MOD 30 MIN: CPT | Performed by: INTERNAL MEDICINE

## 2018-03-20 RX ORDER — PAROXETINE HYDROCHLORIDE 20 MG/1
20 TABLET, FILM COATED ORAL EVERY MORNING
Qty: 30 TABLET | Refills: 5 | Status: SHIPPED | OUTPATIENT
Start: 2018-03-20 | End: 2019-01-11 | Stop reason: SDUPTHER

## 2018-03-20 NOTE — PROGRESS NOTES
"Chief Complaint   Patient presents with   • Mass     L leg   • Mass     L ear       History of Present Illness   Meenakshi Alcala is a 52 y.o. female presents for follow up evaluation. Patient has concerns of a \"mass\" in front of her left ear and a \"mass\" on her neck. Patient has had imaging for neck pain. Area of concern for the neck c/w her spinous process. An ultrasound was obtained in area in front of her left ear. Slightly enlarged lymph node at that site. The area is non tender. Patient reports that it is reducing in size. She is feeling well.     Additional c/o hot flash. She reports she feels irritable routinely and has some difficulty sleeping due to hot flashes. Avoiding hormones due to h/o breast cancer.     The following portions of the patient's history were reviewed and updated as appropriate: allergies, current medications, past family history, past medical history, past social history, past surgical history and problem list.  Current Outpatient Prescriptions on File Prior to Visit   Medication Sig Dispense Refill   • amLODIPine (NORVASC) 5 MG tablet Take 1 tablet by mouth Daily. 90 tablet 2   • cyclobenzaprine (FLEXERIL) 10 MG tablet TAKE ONE TABLET BY MOUTH THREE TIMES A DAY AS NEEDED FOR MUSCLE SPASMS 30 tablet 2   • DEXILANT 60 MG capsule TAKE ONE CAPSULE BY MOUTH DAILY 90 capsule 1   • diclofenac (VOLTAREN) 1 % gel gel Apply 4 g topically 4 (Four) Times a Day. 100 g 6   • fluticasone (FLONASE) 50 MCG/ACT nasal spray PLACE TWO SPRAYS IN EACH NOSTRIL ONCE DAILY 16 each 3   • fluticasone (FLONASE) 50 MCG/ACT nasal spray 2 sprays into each nostril Daily for 30 days. Administer 2 sprays in each nostril for each dose. 1 bottle 11   • LINZESS 145 MCG capsule TAKE ONE CAPSULE BY MOUTH DAILY 90 capsule 1   • meloxicam (MOBIC) 15 MG tablet Take 1 tablet by mouth Daily. 30 tablet 1   • sertraline (ZOLOFT) 100 MG tablet TAKE ONE TABLET BY MOUTH DAILY 90 tablet 1   • traMADol (ULTRAM) 50 MG tablet TAKE ONE " TABLET BY MOUTH EVERY 6 HOURS AS NEEDED FOR MODERATE PAIN (4 TO 6 ON PAIN SCALE) 30 tablet 3   • traZODone (DESYREL) 50 MG tablet Take 1 tablet by mouth Every Night. 30 tablet 5   • VENTOLIN  (90 Base) MCG/ACT inhaler INHALE TWO PUFFS BY MOUTH EVERY 6 HOURS 1 inhaler 5   • [DISCONTINUED] amoxicillin (AMOXIL) 875 MG tablet Take 1 tablet by mouth 2 (Two) Times a Day. 20 tablet 0     No current facility-administered medications on file prior to visit.      Review of Systems   Constitutional: Negative.    HENT:        Left ear w/ cyst anterior to this.    Eyes: Negative.    Respiratory: Negative.    Cardiovascular: Negative.    Gastrointestinal: Negative.    Endocrine: Negative.    Genitourinary: Negative.    Musculoskeletal: Negative.    Skin: Negative.    Allergic/Immunologic: Negative.    Neurological: Negative.    Hematological: Negative.    Psychiatric/Behavioral: Negative.        Objective   Physical Exam   Constitutional: She is oriented to person, place, and time. She appears well-developed and well-nourished.   HENT:   Head: Normocephalic and atraumatic.   Right Ear: Hearing, tympanic membrane and external ear normal.   Left Ear: Hearing, tympanic membrane and external ear normal.   Nose: Nose normal.   Mouth/Throat: Oropharynx is clear and moist.   Small mobile soft nontender nodule anterior to left ear.    Neck: Neck supple. No thyromegaly present.   Cardiovascular: Normal rate, regular rhythm and normal heart sounds.    No murmur heard.  Pulmonary/Chest: Effort normal and breath sounds normal. Right breast exhibits no mass. Left breast exhibits no mass.   Abdominal: Soft. She exhibits no distension. There is no hepatosplenomegaly. There is no tenderness.   Genitourinary: No breast tenderness.   Lymphadenopathy:     She has no cervical adenopathy.   Neurological: She is alert and oriented to person, place, and time.   Skin: Skin is warm and dry.   Psychiatric: She has a normal mood and affect. Her  "speech is normal and behavior is normal. Judgment and thought content normal. Cognition and memory are normal.        /70   Pulse 83   Wt 70.3 kg (155 lb)   SpO2 99%   BMI 24.04 kg/m²     Assessment/Plan   Diagnoses and all orders for this visit:    Nodule of left external ear  -     Ambulatory Referral to ENT (Otolaryngology)    left ear nodule. Slightly enlarged lymph node in patient w/ h/o breast ca. Will refer to ENT for further evaluation and treatment. Suspect benign nature but will monitor this. Reassured patient htat her neck \"nodule\" is really a spinous process and she does not need to be concerned about this. She will f/u routinely.   Additional c/o hot flash and irritability. Will try switching from zoloft to paxil and monitor.              "

## 2018-04-12 ENCOUNTER — APPOINTMENT (OUTPATIENT)
Dept: LAB | Facility: HOSPITAL | Age: 53
End: 2018-04-12

## 2018-04-12 ENCOUNTER — APPOINTMENT (OUTPATIENT)
Dept: ONCOLOGY | Facility: CLINIC | Age: 53
End: 2018-04-12

## 2018-04-19 ENCOUNTER — LAB (OUTPATIENT)
Dept: LAB | Facility: HOSPITAL | Age: 53
End: 2018-04-19

## 2018-04-19 ENCOUNTER — OFFICE VISIT (OUTPATIENT)
Dept: ONCOLOGY | Facility: CLINIC | Age: 53
End: 2018-04-19

## 2018-04-19 VITALS
HEART RATE: 83 BPM | SYSTOLIC BLOOD PRESSURE: 118 MMHG | OXYGEN SATURATION: 99 % | TEMPERATURE: 97.9 F | RESPIRATION RATE: 14 BRPM | BODY MASS INDEX: 24.39 KG/M2 | DIASTOLIC BLOOD PRESSURE: 76 MMHG | HEIGHT: 67 IN | WEIGHT: 155.4 LBS

## 2018-04-19 DIAGNOSIS — C50.411 MALIGNANT NEOPLASM OF UPPER-OUTER QUADRANT OF RIGHT BREAST IN FEMALE, ESTROGEN RECEPTOR NEGATIVE (HCC): ICD-10-CM

## 2018-04-19 DIAGNOSIS — C50.411 MALIGNANT NEOPLASM OF UPPER-OUTER QUADRANT OF RIGHT BREAST IN FEMALE, ESTROGEN RECEPTOR NEGATIVE (HCC): Primary | ICD-10-CM

## 2018-04-19 DIAGNOSIS — Z17.1 MALIGNANT NEOPLASM OF UPPER-OUTER QUADRANT OF RIGHT BREAST IN FEMALE, ESTROGEN RECEPTOR NEGATIVE (HCC): Primary | ICD-10-CM

## 2018-04-19 DIAGNOSIS — Z17.1 MALIGNANT NEOPLASM OF UPPER-OUTER QUADRANT OF RIGHT BREAST IN FEMALE, ESTROGEN RECEPTOR NEGATIVE (HCC): ICD-10-CM

## 2018-04-19 LAB
ALBUMIN SERPL-MCNC: 4.5 G/DL (ref 3.5–5.2)
ALBUMIN/GLOB SERPL: 1.6 G/DL (ref 1.1–2.4)
ALP SERPL-CCNC: 138 U/L (ref 38–116)
ALT SERPL W P-5'-P-CCNC: 14 U/L (ref 0–33)
ANION GAP SERPL CALCULATED.3IONS-SCNC: 10.1 MMOL/L
AST SERPL-CCNC: 19 U/L (ref 0–32)
BASOPHILS # BLD AUTO: 0.06 10*3/MM3 (ref 0–0.1)
BASOPHILS NFR BLD AUTO: 0.9 % (ref 0–1.1)
BILIRUB SERPL-MCNC: 0.2 MG/DL (ref 0.1–1.2)
BUN BLD-MCNC: 7 MG/DL (ref 6–20)
BUN/CREAT SERPL: 12.7 (ref 7.3–30)
CALCIUM SPEC-SCNC: 9.8 MG/DL (ref 8.5–10.2)
CHLORIDE SERPL-SCNC: 101 MMOL/L (ref 98–107)
CO2 SERPL-SCNC: 31.9 MMOL/L (ref 22–29)
CREAT BLD-MCNC: 0.55 MG/DL (ref 0.6–1.1)
DEPRECATED RDW RBC AUTO: 47.2 FL (ref 37–49)
EOSINOPHIL # BLD AUTO: 0.14 10*3/MM3 (ref 0–0.36)
EOSINOPHIL NFR BLD AUTO: 2 % (ref 1–5)
ERYTHROCYTE [DISTWIDTH] IN BLOOD BY AUTOMATED COUNT: 14.6 % (ref 11.7–14.5)
GFR SERPL CREATININE-BSD FRML MDRD: 140 ML/MIN/1.73
GLOBULIN UR ELPH-MCNC: 2.9 GM/DL (ref 1.8–3.5)
GLUCOSE BLD-MCNC: 97 MG/DL (ref 74–124)
HCT VFR BLD AUTO: 37.4 % (ref 34–45)
HGB BLD-MCNC: 11.6 G/DL (ref 11.5–14.9)
IMM GRANULOCYTES # BLD: 0.02 10*3/MM3 (ref 0–0.03)
IMM GRANULOCYTES NFR BLD: 0.3 % (ref 0–0.5)
LYMPHOCYTES # BLD AUTO: 3.24 10*3/MM3 (ref 1–3.5)
LYMPHOCYTES NFR BLD AUTO: 46 % (ref 20–49)
MCH RBC QN AUTO: 27.4 PG (ref 27–33)
MCHC RBC AUTO-ENTMCNC: 31 G/DL (ref 32–35)
MCV RBC AUTO: 88.2 FL (ref 83–97)
MONOCYTES # BLD AUTO: 0.63 10*3/MM3 (ref 0.25–0.8)
MONOCYTES NFR BLD AUTO: 8.9 % (ref 4–12)
NEUTROPHILS # BLD AUTO: 2.96 10*3/MM3 (ref 1.5–7)
NEUTROPHILS NFR BLD AUTO: 41.9 % (ref 39–75)
NRBC BLD MANUAL-RTO: 0 /100 WBC (ref 0–0)
PLATELET # BLD AUTO: 278 10*3/MM3 (ref 150–375)
PMV BLD AUTO: 9.4 FL (ref 8.9–12.1)
POTASSIUM BLD-SCNC: 4.3 MMOL/L (ref 3.5–4.7)
PROT SERPL-MCNC: 7.4 G/DL (ref 6.3–8)
RBC # BLD AUTO: 4.24 10*6/MM3 (ref 3.9–5)
SODIUM BLD-SCNC: 143 MMOL/L (ref 134–145)
WBC NRBC COR # BLD: 7.05 10*3/MM3 (ref 4–10)

## 2018-04-19 PROCEDURE — 36415 COLL VENOUS BLD VENIPUNCTURE: CPT | Performed by: INTERNAL MEDICINE

## 2018-04-19 PROCEDURE — 80053 COMPREHEN METABOLIC PANEL: CPT | Performed by: INTERNAL MEDICINE

## 2018-04-19 PROCEDURE — 99215 OFFICE O/P EST HI 40 MIN: CPT | Performed by: INTERNAL MEDICINE

## 2018-04-19 PROCEDURE — 85025 COMPLETE CBC W/AUTO DIFF WBC: CPT | Performed by: INTERNAL MEDICINE

## 2018-04-19 NOTE — PROGRESS NOTES
Subjective .     REASONS FOR FOLLOWUP:  Breast cancer    HISTORY OF PRESENT ILLNESS:  The patient is a 52 y.o. year old female  who is here for follow-up with the above-mentioned history.    No change in baseline nausea or chronic back pain.  No new problems.  No complaints of neurological symptoms.  States she developed a left preauricular kaleb enlargement after having the flu which prompted an ENT referral and ultrasound.  Dr. Ordoñez of ENT planned follow-up with him in 3 months for observation.    Past Medical History:   Diagnosis Date   • Anemia    • H/O transfusion of packed red blood cells    • Headache    • Malignant neoplasm of breast 2013    Right, T2N1M0, Stage IIB       HEMATOLOGIC/ONCOLOGIC HISTORY:  (History from previous dates can be found in the separate document.)    MEDICATIONS    Current Outpatient Prescriptions:   •  amLODIPine (NORVASC) 5 MG tablet, Take 1 tablet by mouth Daily., Disp: 90 tablet, Rfl: 2  •  cyclobenzaprine (FLEXERIL) 10 MG tablet, TAKE ONE TABLET BY MOUTH THREE TIMES A DAY AS NEEDED FOR MUSCLE SPASMS, Disp: 30 tablet, Rfl: 2  •  DEXILANT 60 MG capsule, TAKE ONE CAPSULE BY MOUTH DAILY, Disp: 90 capsule, Rfl: 1  •  diclofenac (VOLTAREN) 1 % gel gel, Apply 4 g topically 4 (Four) Times a Day., Disp: 100 g, Rfl: 6  •  fluticasone (FLONASE) 50 MCG/ACT nasal spray, PLACE TWO SPRAYS IN EACH NOSTRIL ONCE DAILY, Disp: 16 each, Rfl: 3  •  LINZESS 145 MCG capsule, TAKE ONE CAPSULE BY MOUTH DAILY, Disp: 90 capsule, Rfl: 1  •  meloxicam (MOBIC) 15 MG tablet, Take 1 tablet by mouth Daily., Disp: 30 tablet, Rfl: 1  •  PARoxetine (PAXIL) 20 MG tablet, Take 1 tablet by mouth Every Morning., Disp: 30 tablet, Rfl: 5  •  sertraline (ZOLOFT) 100 MG tablet, TAKE ONE TABLET BY MOUTH DAILY, Disp: 90 tablet, Rfl: 1  •  traMADol (ULTRAM) 50 MG tablet, TAKE ONE TABLET BY MOUTH EVERY 6 HOURS AS NEEDED FOR MODERATE PAIN (4 TO 6 ON PAIN SCALE), Disp: 30 tablet, Rfl: 3  •  traZODone (DESYREL) 50 MG tablet,  Take 1 tablet by mouth Every Night., Disp: 30 tablet, Rfl: 5  •  VENTOLIN  (90 Base) MCG/ACT inhaler, INHALE TWO PUFFS BY MOUTH EVERY 6 HOURS, Disp: 1 inhaler, Rfl: 5    ALLERGIES:     Allergies   Allergen Reactions   • Erythromycin        SOCIAL HISTORY:       Social History     Social History   • Marital status:      Spouse name: N/A   • Number of children: N/A   • Years of education: College     Occupational History   • [employer]     Social History Main Topics   • Smoking status: Former Smoker     Packs/day: 1.00     Years: 25.00     Types: Cigarettes   • Smokeless tobacco: Former User   • Alcohol use Yes      Comment: Occasional   • Drug use: No   • Sexual activity: Not on file     Other Topics Concern   • Not on file     Social History Narrative   • No narrative on file         FAMILY HISTORY:  Family History   Problem Relation Age of Onset   • Heart disease Mother    • Hypertension Mother    • Stomach cancer Father    • Ovarian cancer Maternal Aunt    • Stomach cancer Maternal Aunt        REVIEW OF SYSTEMS:  Review of Systems   Constitutional: Negative for activity change.   HENT: Negative for nosebleeds and trouble swallowing.    Respiratory: Negative for shortness of breath and wheezing.    Cardiovascular: Negative for chest pain and palpitations.   Gastrointestinal: Positive for nausea. Negative for constipation and diarrhea.   Genitourinary: Negative for dysuria and hematuria.   Musculoskeletal: Positive for back pain. Negative for arthralgias and myalgias.   Skin: Negative for rash and wound.   Neurological: Negative for seizures and syncope.   Hematological: Positive for adenopathy. Does not bruise/bleed easily.   Psychiatric/Behavioral: Negative for confusion.          Objective    Vitals:    04/19/18 1611   BP: 118/76   Pulse: 83   Resp: 14   Temp: 97.9 °F (36.6 °C)   TempSrc: Oral   SpO2: 99%  Comment: at rest   Weight: 70.5 kg (155 lb 6.4 oz)   Height: 169.5 cm  "(66.73\")  Comment: new height   PainSc: 7  Comment: all over body pain     Current Status 4/19/2018   ECOG score 0      PHYSICAL EXAM:    CONSTITUTIONAL:  Vital signs reviewed.  No distress, looks comfortable.  EYES:  Conjunctiva and lids unremarkable.  PERRLA  EARS,NOSE,MOUTH,THROAT:  Ears and nose appear unremarkable.  Lips, teeth, gums appear unremarkable.  RESPIRATORY:  Normal respiratory effort.  Lungs clear to auscultation bilaterally.  CARDIOVASCULAR:  Normal S1, S2.  No murmurs rubs or gallops.  No significant lower extremity edema.  BREAST: no masses by palpation or inspection   GASTROINTESTINAL: Abdomen appears unremarkable.  Nontender.  No hepatomegaly.  No splenomegaly.  LYMPHATIC:  No cervical, supraclavicular, axillary lymphadenopathy.  Minimally enlarged left preauricular lymph node.  more prominent than the contralateral side.  MUSCULOSKELETAL:  Unremarkable gait and station.  Unremarkable digits/nails.  No cyanosis or clubbing.  SKIN:  Warm.  No rashes.  PSYCHIATRIC:  Normal judgment and insight.  Normal mood and affect.     RECENT LABS:        WBC   Date/Time Value Ref Range Status   04/19/2018 04:04 PM 7.05 4.00 - 10.00 10*3/mm3 Final     Hemoglobin   Date/Time Value Ref Range Status   04/19/2018 04:04 PM 11.6 11.5 - 14.9 g/dL Final     Platelets   Date/Time Value Ref Range Status   04/19/2018 04:04  150 - 375 10*3/mm3 Final       Assessment/Plan     ASSESSMENT:  1. T2N1M0 (stage IIB) invasive ductal carcinoma of the right breast. [place] score 9 of 9 (high grade), triple-negative, 3.3 cm tumor. Right mastectomy and TRAM flap reconstruction. Completed dose-dense Adriamycin/Cytoxan with dose-dense Taxol 07/11/2013. Completed radiation October 2013.   I reminded her Again today we would be quite unusual for her cancer to recur this late.  There is a good probability she is cured of this cancer.  Appears to remain in remission.    2. Nausea.  IBS.  Managed by both Dr. Gallegos and Dr. Ross " Waqar.    3. Intermittent blurry vision, left eye more so than right eye.  She has seen her ophthalmologist for this.  She states nothing concerning was found.  She follows with Dr. Kiran Cabrera of neurology for this.  A prior MRI read as possible optic neuritis.  She states the follow-up MRI was fine.  She did not complain of this today.    4.  Weight loss.  Resolved.  165 pounds October 2014.  150 pounds April 2015.  145 pounds January 2016.  140 pounds February 2016.  136 pounds April 1, 2016.  132 pounds April 25, 2016.  Gained, up to 139 pounds, 5/9/16.  Weight is been stable since then.    5.  2 liver lesions on 1/31/13 staging CAT scan.  Liver protocol CT subsequently read as benign liver lesions.  Liver lesions unchanged on CT 5/4/16.    6.  She has an annual  for breast cancer.      7.  Chronic sciatica and cervical pain.  Treatment through her PCP, Dr. Vandana Zavaleta.  As had epidurals.    8.  Left preauricular minimally enlarged node.  Follows with Dr. Tin Ordoñez of ENT.  The node does not feel malignant my exam.  I reviewed records from Dr. Ordoñez.  This was a new problem to address today with no additional workup planned.    PLAN:   M.D. CBC CMP 6 months  Her port has been removed.   Last mammogram, 2/1/17, BI-RADS 2.--I have asked the triage nurses to call her and make sure she is up-to-date on her mammograms.

## 2018-04-20 ENCOUNTER — TELEPHONE (OUTPATIENT)
Dept: ONCOLOGY | Facility: CLINIC | Age: 53
End: 2018-04-20

## 2018-04-20 DIAGNOSIS — Z85.3 HISTORY OF BREAST CANCER IN ADULTHOOD: Primary | ICD-10-CM

## 2018-04-20 DIAGNOSIS — Z12.31 ENCOUNTER FOR SCREENING MAMMOGRAM FOR BREAST CANCER: ICD-10-CM

## 2018-05-08 ENCOUNTER — APPOINTMENT (OUTPATIENT)
Dept: MAMMOGRAPHY | Facility: HOSPITAL | Age: 53
End: 2018-05-08
Attending: INTERNAL MEDICINE

## 2018-05-17 ENCOUNTER — HOSPITAL ENCOUNTER (OUTPATIENT)
Dept: MAMMOGRAPHY | Facility: HOSPITAL | Age: 53
Discharge: HOME | End: 2018-05-17
Attending: INTERNAL MEDICINE | Admitting: INTERNAL MEDICINE

## 2018-05-17 DIAGNOSIS — Z85.3 HISTORY OF BREAST CANCER IN ADULTHOOD: ICD-10-CM

## 2018-05-17 DIAGNOSIS — Z12.31 ENCOUNTER FOR SCREENING MAMMOGRAM FOR BREAST CANCER: ICD-10-CM

## 2018-05-17 PROCEDURE — 77067 SCR MAMMO BI INCL CAD: CPT

## 2018-05-18 DIAGNOSIS — R11.0 NAUSEA: ICD-10-CM

## 2018-05-18 DIAGNOSIS — K21.00 GASTROESOPHAGEAL REFLUX DISEASE WITH ESOPHAGITIS: ICD-10-CM

## 2018-05-18 RX ORDER — DEXLANSOPRAZOLE 60 MG/1
CAPSULE, DELAYED RELEASE ORAL
Qty: 90 CAPSULE | Refills: 0 | Status: SHIPPED | OUTPATIENT
Start: 2018-05-18 | End: 2018-11-05 | Stop reason: SDUPTHER

## 2018-07-10 ENCOUNTER — OFFICE VISIT (OUTPATIENT)
Dept: INTERNAL MEDICINE | Facility: CLINIC | Age: 53
End: 2018-07-10

## 2018-07-10 VITALS
HEART RATE: 94 BPM | DIASTOLIC BLOOD PRESSURE: 64 MMHG | BODY MASS INDEX: 24.16 KG/M2 | SYSTOLIC BLOOD PRESSURE: 124 MMHG | OXYGEN SATURATION: 99 % | WEIGHT: 153 LBS

## 2018-07-10 DIAGNOSIS — M65.331 TRIGGER MIDDLE FINGER OF RIGHT HAND: Primary | ICD-10-CM

## 2018-07-10 DIAGNOSIS — R29.898 DECREASED GRIP STRENGTH: ICD-10-CM

## 2018-07-10 DIAGNOSIS — Z11.59 ENCOUNTER FOR HEPATITIS C SCREENING TEST FOR LOW RISK PATIENT: ICD-10-CM

## 2018-07-10 DIAGNOSIS — G62.9 NEUROPATHY: ICD-10-CM

## 2018-07-10 DIAGNOSIS — N95.1 MENOPAUSAL SYNDROME (HOT FLASHES): ICD-10-CM

## 2018-07-10 DIAGNOSIS — F41.9 ANXIETY: ICD-10-CM

## 2018-07-10 PROCEDURE — 99214 OFFICE O/P EST MOD 30 MIN: CPT | Performed by: INTERNAL MEDICINE

## 2018-07-10 RX ORDER — GABAPENTIN 100 MG/1
100 CAPSULE ORAL 3 TIMES DAILY
Qty: 90 CAPSULE | Refills: 3 | Status: SHIPPED | OUTPATIENT
Start: 2018-07-10 | End: 2019-03-22

## 2018-07-10 NOTE — PROGRESS NOTES
Chief Complaint   Patient presents with   • Hypertension     Hypertension, B hand pain w/ reduced  strength, neck pain, back pain, hot flash  History of Present Illness   Meenakshi Alcala is a 53 y.o. female presents for follow up evaluation. She has some chronic and acute (new) needs to address today. Patient has a history of breast cancer. She is now having hot flash. She is taking paroxetine for mood and for this and is taking trazodone at night. She is having hot flash throughout the day and some disrupted sleep at night. Patient reports some neck pain w/ decreased  strength, numbness, pain, and cramping in the hands as well. Prior cspine mri w/ ddd mildly abutting the cord. She is also having some      The following portions of the patient's history were reviewed and updated as appropriate: allergies, current medications, past family history, past medical history, past social history, past surgical history and problem list.  Current Outpatient Prescriptions on File Prior to Visit   Medication Sig Dispense Refill   • amLODIPine (NORVASC) 5 MG tablet Take 1 tablet by mouth Daily. 90 tablet 2   • cyclobenzaprine (FLEXERIL) 10 MG tablet TAKE ONE TABLET BY MOUTH THREE TIMES A DAY AS NEEDED FOR MUSCLE SPASMS 30 tablet 2   • DEXILANT 60 MG capsule TAKE ONE CAPSULE BY MOUTH DAILY 90 capsule 0   • fluticasone (FLONASE) 50 MCG/ACT nasal spray PLACE TWO SPRAYS IN EACH NOSTRIL ONCE DAILY 16 each 3   • LINZESS 145 MCG capsule TAKE ONE CAPSULE BY MOUTH DAILY 90 capsule 1   • meloxicam (MOBIC) 15 MG tablet Take 1 tablet by mouth Daily. 30 tablet 1   • PARoxetine (PAXIL) 20 MG tablet Take 1 tablet by mouth Every Morning. 30 tablet 5   • traMADol (ULTRAM) 50 MG tablet TAKE ONE TABLET BY MOUTH EVERY 6 HOURS AS NEEDED FOR MODERATE PAIN (4 TO 6 ON PAIN SCALE) 30 tablet 3   • traZODone (DESYREL) 50 MG tablet Take 1 tablet by mouth Every Night. 30 tablet 5   • VENTOLIN  (90 Base) MCG/ACT inhaler INHALE TWO PUFFS BY  MOUTH EVERY 6 HOURS 1 inhaler 5   • VOLTAREN 1 % gel gel APPLY FOUR GRAMS TO AFFECTED AREA(S) FOUR TIMES A  g 5   • [DISCONTINUED] sertraline (ZOLOFT) 100 MG tablet TAKE ONE TABLET BY MOUTH DAILY 90 tablet 1     No current facility-administered medications on file prior to visit.      Review of Systems   Constitutional: Negative.    HENT: Negative.    Eyes: Negative.    Respiratory: Negative.    Cardiovascular: Negative.    Gastrointestinal: Negative.    Endocrine: Negative.    Genitourinary: Negative.    Musculoskeletal: Positive for arthralgias.        Decreased  strenght B hands   Skin: Negative.    Allergic/Immunologic: Negative.    Neurological: Positive for numbness.   Hematological: Negative.    Psychiatric/Behavioral: Negative.        Objective   Physical Exam   Constitutional: She is oriented to person, place, and time. She appears well-developed and well-nourished.   HENT:   Head: Normocephalic and atraumatic.   Right Ear: External ear normal.   Left Ear: External ear normal.   Nose: Nose normal.   Mouth/Throat: Oropharynx is clear and moist.   Eyes: Conjunctivae and EOM are normal. Pupils are equal, round, and reactive to light.   Neck: Normal range of motion. Neck supple.   Cardiovascular: Normal rate, regular rhythm, normal heart sounds and intact distal pulses.    Pulmonary/Chest: Effort normal and breath sounds normal.   Abdominal: Soft. Bowel sounds are normal.   Musculoskeletal:   Trigger finger right middle digit.    Neurological: She is alert and oriented to person, place, and time.   Decreased  strength bilateral hands.    Skin: Skin is warm and dry.   Psychiatric: She has a normal mood and affect. Her behavior is normal. Judgment and thought content normal.   Nursing note and vitals reviewed.       /64   Pulse 94   Wt 69.4 kg (153 lb)   SpO2 99%   BMI 24.16 kg/m²     Assessment/Plan   Diagnoses and all orders for this visit:    Trigger middle finger of right hand  -      Ambulatory Referral to Hand Surgery    Decreased  strength  -     Ambulatory Referral to Hand Surgery    Menopausal syndrome (hot flashes)    Neuropathy    Other orders  -     gabapentin (NEURONTIN) 100 MG capsule; Take 1 capsule by mouth 3 (Three) Times a Day.        Patient with menopausal syndromes. As she has a history of breast cancer will remain non hormonal. She will try gabapentin 100 mg may increase to 3 times daily or one am 2 at night. Patient will follow up with hand surgeon for her trigger finger. She will follow up routinely. Will test listed labs.

## 2018-07-11 LAB
ALBUMIN SERPL-MCNC: 5 G/DL (ref 3.5–5.2)
ALBUMIN/GLOB SERPL: 2.2 G/DL
ALP SERPL-CCNC: 129 U/L (ref 39–117)
ALT SERPL-CCNC: 12 U/L (ref 1–33)
AST SERPL-CCNC: 14 U/L (ref 1–32)
BASOPHILS # BLD AUTO: 0.04 10*3/MM3 (ref 0–0.2)
BASOPHILS NFR BLD AUTO: 0.7 % (ref 0–1.5)
BILIRUB SERPL-MCNC: 0.7 MG/DL (ref 0.1–1.2)
BUN SERPL-MCNC: 10 MG/DL (ref 6–20)
BUN/CREAT SERPL: 13.9 (ref 7–25)
CALCIUM SERPL-MCNC: 10.3 MG/DL (ref 8.6–10.5)
CHLORIDE SERPL-SCNC: 103 MMOL/L (ref 98–107)
CO2 SERPL-SCNC: 30.2 MMOL/L (ref 22–29)
CREAT SERPL-MCNC: 0.72 MG/DL (ref 0.57–1)
EOSINOPHIL # BLD AUTO: 0.09 10*3/MM3 (ref 0–0.7)
EOSINOPHIL NFR BLD AUTO: 1.5 % (ref 0.3–6.2)
ERYTHROCYTE [DISTWIDTH] IN BLOOD BY AUTOMATED COUNT: 14.8 % (ref 11.7–13)
FOLATE SERPL-MCNC: 15.64 NG/ML (ref 4.78–24.2)
GLOBULIN SER CALC-MCNC: 2.3 GM/DL
GLUCOSE SERPL-MCNC: 91 MG/DL (ref 65–99)
HCT VFR BLD AUTO: 38.8 % (ref 35.6–45.5)
HCV AB S/CO SERPL IA: <0.1 S/CO RATIO (ref 0–0.9)
HGB BLD-MCNC: 12.4 G/DL (ref 11.9–15.5)
IMM GRANULOCYTES # BLD: 0 10*3/MM3 (ref 0–0.03)
IMM GRANULOCYTES NFR BLD: 0 % (ref 0–0.5)
LYMPHOCYTES # BLD AUTO: 2.43 10*3/MM3 (ref 0.9–4.8)
LYMPHOCYTES NFR BLD AUTO: 41.4 % (ref 19.6–45.3)
MCH RBC QN AUTO: 27.3 PG (ref 26.9–32)
MCHC RBC AUTO-ENTMCNC: 32 G/DL (ref 32.4–36.3)
MCV RBC AUTO: 85.5 FL (ref 80.5–98.2)
MONOCYTES # BLD AUTO: 0.53 10*3/MM3 (ref 0.2–1.2)
MONOCYTES NFR BLD AUTO: 9 % (ref 5–12)
NEUTROPHILS # BLD AUTO: 2.78 10*3/MM3 (ref 1.9–8.1)
NEUTROPHILS NFR BLD AUTO: 47.4 % (ref 42.7–76)
PLATELET # BLD AUTO: 310 10*3/MM3 (ref 140–500)
POTASSIUM SERPL-SCNC: 4.6 MMOL/L (ref 3.5–5.2)
PROT SERPL-MCNC: 7.3 G/DL (ref 6–8.5)
RBC # BLD AUTO: 4.54 10*6/MM3 (ref 3.9–5.2)
SODIUM SERPL-SCNC: 145 MMOL/L (ref 136–145)
TSH SERPL DL<=0.005 MIU/L-ACNC: 0.6 MIU/ML (ref 0.27–4.2)
VIT B12 SERPL-MCNC: 392 PG/ML (ref 211–946)
WBC # BLD AUTO: 5.87 10*3/MM3 (ref 4.5–10.7)

## 2018-07-11 NOTE — PROGRESS NOTES
Your laboratory results are NORMAL. We will discuss these results in detail at your next office visit. Please call with any questions or concerns.   Sincerely,  Vandana Zavaleta MD

## 2018-08-06 ENCOUNTER — TELEPHONE (OUTPATIENT)
Dept: INTERNAL MEDICINE | Facility: CLINIC | Age: 53
End: 2018-08-06

## 2018-08-06 NOTE — TELEPHONE ENCOUNTER
Stop gabapentin.  She can continue paroxetine or switch from praoxetine to pristiq 50 mg one daily in am.  Increase hydration w/ ice water. Avoid synthetic materials for clothing (cotton is best). Avoid staying outside for prolonged periods. Cooling pads. Avoid hot showers.

## 2018-08-06 NOTE — TELEPHONE ENCOUNTER
"Pt called to say that the gabapentin that was given for hot flashes is not working. She feels \"loopy\" and they are \"making me have more hot flashes\"   "

## 2018-08-17 RX ORDER — CYCLOBENZAPRINE HCL 10 MG
TABLET ORAL
Qty: 30 TABLET | Refills: 1 | Status: SHIPPED | OUTPATIENT
Start: 2018-08-17 | End: 2018-10-27 | Stop reason: SDUPTHER

## 2018-09-10 RX ORDER — TRAMADOL HYDROCHLORIDE 50 MG/1
TABLET ORAL
Qty: 30 TABLET | Refills: 2 | Status: SHIPPED | OUTPATIENT
Start: 2018-09-10 | End: 2019-07-23 | Stop reason: SDUPTHER

## 2018-10-05 ENCOUNTER — OFFICE VISIT (OUTPATIENT)
Dept: INTERNAL MEDICINE | Facility: CLINIC | Age: 53
End: 2018-10-05

## 2018-10-05 VITALS
DIASTOLIC BLOOD PRESSURE: 60 MMHG | HEART RATE: 96 BPM | WEIGHT: 158 LBS | BODY MASS INDEX: 24.95 KG/M2 | SYSTOLIC BLOOD PRESSURE: 124 MMHG | OXYGEN SATURATION: 99 %

## 2018-10-05 DIAGNOSIS — G47.00 INSOMNIA, UNSPECIFIED TYPE: ICD-10-CM

## 2018-10-05 DIAGNOSIS — N39.3 STRESS INCONTINENCE: Primary | ICD-10-CM

## 2018-10-05 DIAGNOSIS — I10 ESSENTIAL HYPERTENSION: ICD-10-CM

## 2018-10-05 LAB
BILIRUB BLD-MCNC: NEGATIVE MG/DL
CLARITY, POC: CLEAR
COLOR UR: YELLOW
GLUCOSE UR STRIP-MCNC: NEGATIVE MG/DL
KETONES UR QL: NEGATIVE
LEUKOCYTE EST, POC: NEGATIVE
NITRITE UR-MCNC: NEGATIVE MG/ML
PH UR: 6 [PH] (ref 5–8)
PROT UR STRIP-MCNC: ABNORMAL MG/DL
RBC # UR STRIP: ABNORMAL /UL
SP GR UR: 1.03 (ref 1–1.03)
UROBILINOGEN UR QL: NORMAL

## 2018-10-05 PROCEDURE — 90471 IMMUNIZATION ADMIN: CPT | Performed by: INTERNAL MEDICINE

## 2018-10-05 PROCEDURE — 99214 OFFICE O/P EST MOD 30 MIN: CPT | Performed by: INTERNAL MEDICINE

## 2018-10-05 PROCEDURE — 81003 URINALYSIS AUTO W/O SCOPE: CPT | Performed by: INTERNAL MEDICINE

## 2018-10-05 PROCEDURE — 90632 HEPA VACCINE ADULT IM: CPT | Performed by: INTERNAL MEDICINE

## 2018-10-05 RX ORDER — TRAZODONE HYDROCHLORIDE 50 MG/1
100 TABLET ORAL NIGHTLY
Qty: 60 TABLET | Refills: 5 | Status: SHIPPED | OUTPATIENT
Start: 2018-10-05 | End: 2019-06-06 | Stop reason: SDUPTHER

## 2018-10-05 NOTE — PROGRESS NOTES
Chief Complaint   Patient presents with   • Urinary Incontinence   • Hypertension   • Insomnia       History of Present Illness   Meenakshi Alcala is a 53 y.o. female presents for follow up evaluation with acute needs. Patient has insomnia. She takes trazodone 50 mg every evening. She sleeps from 9-10 pm until 1-3 am. She is waking with hot flash sensation or can just wake with no reason.   Has a history of hypertension. bp is normotensive on amlodipine.   Patient w/ c/o stress incontinence. Cough is a huge challenge. Also sneeze or jump.     The following portions of the patient's history were reviewed and updated as appropriate: allergies, current medications, past family history, past medical history, past social history, past surgical history and problem list.  Current Outpatient Prescriptions on File Prior to Visit   Medication Sig Dispense Refill   • amLODIPine (NORVASC) 5 MG tablet Take 1 tablet by mouth Daily. 90 tablet 2   • cyclobenzaprine (FLEXERIL) 10 MG tablet TAKE ONE TABLET BY MOUTH THREE TIMES A DAY AS NEEDED FOR MUSCLE SPASMS 30 tablet 1   • DEXILANT 60 MG capsule TAKE ONE CAPSULE BY MOUTH DAILY 90 capsule 0   • fluticasone (FLONASE) 50 MCG/ACT nasal spray PLACE TWO SPRAYS IN EACH NOSTRIL ONCE DAILY 16 each 3   • LINZESS 145 MCG capsule TAKE ONE CAPSULE BY MOUTH DAILY 90 capsule 1   • meloxicam (MOBIC) 15 MG tablet Take 1 tablet by mouth Daily. 30 tablet 1   • PARoxetine (PAXIL) 20 MG tablet Take 1 tablet by mouth Every Morning. 30 tablet 5   • traMADol (ULTRAM) 50 MG tablet TAKE ONE TABLET BY MOUTH EVERY 6 HOURS AS NEEDED FOR MODERATE PAIN 30 tablet 2   • VENTOLIN  (90 Base) MCG/ACT inhaler INHALE TWO PUFFS BY MOUTH EVERY 6 HOURS 1 inhaler 5   • VOLTAREN 1 % gel gel APPLY FOUR GRAMS TO AFFECTED AREA(S) FOUR TIMES A  g 5   • [DISCONTINUED] traZODone (DESYREL) 50 MG tablet Take 1 tablet by mouth Every Night. 30 tablet 5   • gabapentin (NEURONTIN) 100 MG capsule Take 1 capsule by mouth  3 (Three) Times a Day. 90 capsule 3     No current facility-administered medications on file prior to visit.      Review of Systems   Constitutional: Negative.    HENT: Negative.    Eyes: Negative.    Respiratory: Negative.    Cardiovascular: Negative.    Gastrointestinal: Negative.    Endocrine: Negative.    Genitourinary:        Stress incontinence   Musculoskeletal: Negative.    Skin: Negative.    Allergic/Immunologic: Negative.    Neurological: Negative.    Hematological: Negative.    Psychiatric/Behavioral: Negative.        Objective   Physical Exam   Constitutional: She is oriented to person, place, and time. She appears well-developed and well-nourished.   HENT:   Head: Atraumatic.   Right Ear: External ear normal.   Left Ear: External ear normal.   Nose: Nose normal.   Mouth/Throat: Oropharynx is clear and moist.   Eyes: Pupils are equal, round, and reactive to light. Conjunctivae and EOM are normal.   Neck: Normal range of motion. Neck supple.   Cardiovascular: Normal rate, regular rhythm, normal heart sounds and intact distal pulses.    Pulmonary/Chest: Effort normal and breath sounds normal.   Abdominal: Soft. Bowel sounds are normal.   Musculoskeletal: Normal range of motion.   Neurological: She is alert and oriented to person, place, and time.   Skin: Skin is warm and dry.   Psychiatric: She has a normal mood and affect. Her behavior is normal. Judgment and thought content normal.   Nursing note and vitals reviewed.       /60   Pulse 96   Wt 71.7 kg (158 lb)   SpO2 99%   BMI 24.95 kg/m²     Assessment/Plan   Diagnoses and all orders for this visit:    Stress incontinence  -     POC Urinalysis Dipstick, Automated  -     Urine Culture - Urine, Urine, Clean Catch  -     Ambulatory Referral to Gynecologic Urology    Essential hypertension    Insomnia, unspecified type    Other orders  -     traZODone (DESYREL) 50 MG tablet; Take 2 tablets by mouth Every Night.      Patient with hypertension. She  will continue amlodipine for this. She is having difficulty with sleep. She will increase trazodone to 1 1/2 tab every evening. To have 8 full hours after taking med for sleep. Patient will be referred to urogyn for stress incontinence. U/a is neg. Unlikely infectious. She will avoid triggers. She will f/u routinely.

## 2018-10-07 LAB
BACTERIA UR CULT: NORMAL
BACTERIA UR CULT: NORMAL

## 2018-10-18 ENCOUNTER — TELEPHONE (OUTPATIENT)
Dept: INTERNAL MEDICINE | Facility: CLINIC | Age: 53
End: 2018-10-18

## 2018-10-18 RX ORDER — OFLOXACIN 3 MG/ML
1 SOLUTION/ DROPS OPHTHALMIC 4 TIMES DAILY
Qty: 5 ML | Refills: 0 | Status: SHIPPED | OUTPATIENT
Start: 2018-10-18 | End: 2019-07-23

## 2018-10-22 ENCOUNTER — OFFICE VISIT (OUTPATIENT)
Dept: ONCOLOGY | Facility: CLINIC | Age: 53
End: 2018-10-22

## 2018-10-22 ENCOUNTER — LAB (OUTPATIENT)
Dept: LAB | Facility: HOSPITAL | Age: 53
End: 2018-10-22

## 2018-10-22 VITALS
TEMPERATURE: 98.9 F | WEIGHT: 157.4 LBS | DIASTOLIC BLOOD PRESSURE: 78 MMHG | SYSTOLIC BLOOD PRESSURE: 110 MMHG | RESPIRATION RATE: 14 BRPM | OXYGEN SATURATION: 100 % | HEIGHT: 62 IN | BODY MASS INDEX: 28.97 KG/M2 | HEART RATE: 93 BPM

## 2018-10-22 DIAGNOSIS — C50.411 MALIGNANT NEOPLASM OF UPPER-OUTER QUADRANT OF RIGHT BREAST IN FEMALE, ESTROGEN RECEPTOR NEGATIVE (HCC): ICD-10-CM

## 2018-10-22 DIAGNOSIS — Z17.1 MALIGNANT NEOPLASM OF UPPER-OUTER QUADRANT OF RIGHT BREAST IN FEMALE, ESTROGEN RECEPTOR NEGATIVE (HCC): ICD-10-CM

## 2018-10-22 DIAGNOSIS — C50.411 MALIGNANT NEOPLASM OF UPPER-OUTER QUADRANT OF RIGHT BREAST IN FEMALE, ESTROGEN RECEPTOR NEGATIVE (HCC): Primary | ICD-10-CM

## 2018-10-22 DIAGNOSIS — Z17.1 MALIGNANT NEOPLASM OF UPPER-OUTER QUADRANT OF RIGHT BREAST IN FEMALE, ESTROGEN RECEPTOR NEGATIVE (HCC): Primary | ICD-10-CM

## 2018-10-22 LAB
ALBUMIN SERPL-MCNC: 4.4 G/DL (ref 3.5–5.2)
ALBUMIN/GLOB SERPL: 1.5 G/DL (ref 1.1–2.4)
ALP SERPL-CCNC: 127 U/L (ref 38–116)
ALT SERPL W P-5'-P-CCNC: 13 U/L (ref 0–33)
ANION GAP SERPL CALCULATED.3IONS-SCNC: 14 MMOL/L
AST SERPL-CCNC: 17 U/L (ref 0–32)
BASOPHILS # BLD AUTO: 0.06 10*3/MM3 (ref 0–0.1)
BASOPHILS NFR BLD AUTO: 0.9 % (ref 0–1.1)
BILIRUB SERPL-MCNC: 0.4 MG/DL (ref 0.1–1.2)
BUN BLD-MCNC: 8 MG/DL (ref 6–20)
BUN/CREAT SERPL: 11.8 (ref 7.3–30)
CALCIUM SPEC-SCNC: 9.8 MG/DL (ref 8.5–10.2)
CHLORIDE SERPL-SCNC: 102 MMOL/L (ref 98–107)
CO2 SERPL-SCNC: 26 MMOL/L (ref 22–29)
CREAT BLD-MCNC: 0.68 MG/DL (ref 0.6–1.1)
DEPRECATED RDW RBC AUTO: 44.1 FL (ref 37–49)
EOSINOPHIL # BLD AUTO: 0.09 10*3/MM3 (ref 0–0.36)
EOSINOPHIL NFR BLD AUTO: 1.3 % (ref 1–5)
ERYTHROCYTE [DISTWIDTH] IN BLOOD BY AUTOMATED COUNT: 14 % (ref 11.7–14.5)
GFR SERPL CREATININE-BSD FRML MDRD: 109 ML/MIN/1.73
GLOBULIN UR ELPH-MCNC: 2.9 GM/DL (ref 1.8–3.5)
GLUCOSE BLD-MCNC: 107 MG/DL (ref 74–124)
HCT VFR BLD AUTO: 38.6 % (ref 34–45)
HGB BLD-MCNC: 12.2 G/DL (ref 11.5–14.9)
IMM GRANULOCYTES # BLD: 0.02 10*3/MM3 (ref 0–0.03)
IMM GRANULOCYTES NFR BLD: 0.3 % (ref 0–0.5)
LYMPHOCYTES # BLD AUTO: 2.6 10*3/MM3 (ref 1–3.5)
LYMPHOCYTES NFR BLD AUTO: 37 % (ref 20–49)
MCH RBC QN AUTO: 27.1 PG (ref 27–33)
MCHC RBC AUTO-ENTMCNC: 31.6 G/DL (ref 32–35)
MCV RBC AUTO: 85.6 FL (ref 83–97)
MONOCYTES # BLD AUTO: 0.57 10*3/MM3 (ref 0.25–0.8)
MONOCYTES NFR BLD AUTO: 8.1 % (ref 4–12)
NEUTROPHILS # BLD AUTO: 3.69 10*3/MM3 (ref 1.5–7)
NEUTROPHILS NFR BLD AUTO: 52.4 % (ref 39–75)
NRBC BLD MANUAL-RTO: 0 /100 WBC (ref 0–0)
PLATELET # BLD AUTO: 339 10*3/MM3 (ref 150–375)
PMV BLD AUTO: 8.9 FL (ref 8.9–12.1)
POTASSIUM BLD-SCNC: 4.6 MMOL/L (ref 3.5–4.7)
PROT SERPL-MCNC: 7.3 G/DL (ref 6.3–8)
RBC # BLD AUTO: 4.51 10*6/MM3 (ref 3.9–5)
SODIUM BLD-SCNC: 142 MMOL/L (ref 134–145)
WBC NRBC COR # BLD: 7.03 10*3/MM3 (ref 4–10)

## 2018-10-22 PROCEDURE — 85025 COMPLETE CBC W/AUTO DIFF WBC: CPT | Performed by: INTERNAL MEDICINE

## 2018-10-22 PROCEDURE — 80053 COMPREHEN METABOLIC PANEL: CPT | Performed by: INTERNAL MEDICINE

## 2018-10-22 PROCEDURE — 36415 COLL VENOUS BLD VENIPUNCTURE: CPT | Performed by: INTERNAL MEDICINE

## 2018-10-22 PROCEDURE — 99214 OFFICE O/P EST MOD 30 MIN: CPT | Performed by: INTERNAL MEDICINE

## 2018-10-22 NOTE — PROGRESS NOTES
Subjective .     REASONS FOR FOLLOWUP:  Breast cancer    HISTORY OF PRESENT ILLNESS:  The patient is a 53 y.o. year old female  who is here for follow-up with the above-mentioned history.    No new problems.  Denies shortness of air, nausea, pain.    Getting  in 2 weeks.    Past Medical History:   Diagnosis Date   • Anemia    • H/O transfusion of packed red blood cells    • Headache    • Malignant neoplasm of breast (CMS/HCC) 2013    Right, T2N1M0, Stage IIB       HEMATOLOGIC/ONCOLOGIC HISTORY:  (History from previous dates can be found in the separate document.)    MEDICATIONS    Current Outpatient Prescriptions:   •  amLODIPine (NORVASC) 5 MG tablet, Take 1 tablet by mouth Daily., Disp: 90 tablet, Rfl: 2  •  cyclobenzaprine (FLEXERIL) 10 MG tablet, TAKE ONE TABLET BY MOUTH THREE TIMES A DAY AS NEEDED FOR MUSCLE SPASMS, Disp: 30 tablet, Rfl: 1  •  DEXILANT 60 MG capsule, TAKE ONE CAPSULE BY MOUTH DAILY, Disp: 90 capsule, Rfl: 0  •  fluticasone (FLONASE) 50 MCG/ACT nasal spray, PLACE TWO SPRAYS IN EACH NOSTRIL ONCE DAILY, Disp: 16 each, Rfl: 3  •  gabapentin (NEURONTIN) 100 MG capsule, Take 1 capsule by mouth 3 (Three) Times a Day., Disp: 90 capsule, Rfl: 3  •  LINZESS 145 MCG capsule, TAKE ONE CAPSULE BY MOUTH DAILY, Disp: 90 capsule, Rfl: 1  •  meloxicam (MOBIC) 15 MG tablet, Take 1 tablet by mouth Daily., Disp: 30 tablet, Rfl: 1  •  ofloxacin (OCUFLOX) 0.3 % ophthalmic solution, Administer 1 drop into the left eye 4 (Four) Times a Day., Disp: 5 mL, Rfl: 0  •  PARoxetine (PAXIL) 20 MG tablet, Take 1 tablet by mouth Every Morning., Disp: 30 tablet, Rfl: 5  •  traMADol (ULTRAM) 50 MG tablet, TAKE ONE TABLET BY MOUTH EVERY 6 HOURS AS NEEDED FOR MODERATE PAIN, Disp: 30 tablet, Rfl: 2  •  traZODone (DESYREL) 50 MG tablet, Take 2 tablets by mouth Every Night., Disp: 60 tablet, Rfl: 5  •  VENTOLIN  (90 Base) MCG/ACT inhaler, INHALE TWO PUFFS BY MOUTH EVERY 6 HOURS, Disp: 1 inhaler, Rfl: 5  •  VOLTAREN 1 %  "gel gel, APPLY FOUR GRAMS TO AFFECTED AREA(S) FOUR TIMES A DAY, Disp: 100 g, Rfl: 5    ALLERGIES:     Allergies   Allergen Reactions   • Erythromycin        SOCIAL HISTORY:       Social History     Social History   • Marital status:      Spouse name: N/A   • Number of children: N/A   • Years of education: College     Occupational History   • [employer]     Social History Main Topics   • Smoking status: Former Smoker     Packs/day: 1.00     Years: 25.00     Types: Cigarettes   • Smokeless tobacco: Former User   • Alcohol use Yes      Comment: Occasional   • Drug use: No   • Sexual activity: Not on file     Other Topics Concern   • Not on file     Social History Narrative   • No narrative on file         FAMILY HISTORY:  Family History   Problem Relation Age of Onset   • Heart disease Mother    • Hypertension Mother    • Stomach cancer Father    • Ovarian cancer Maternal Aunt    • Stomach cancer Maternal Aunt        REVIEW OF SYSTEMS:  Review of Systems   Constitutional: Negative for activity change.   HENT: Negative for nosebleeds and trouble swallowing.    Respiratory: Negative for shortness of breath and wheezing.    Cardiovascular: Negative for chest pain and palpitations.   Gastrointestinal: Negative for constipation, diarrhea and nausea.   Genitourinary: Negative for dysuria and hematuria.   Musculoskeletal: Negative for arthralgias and myalgias.   Skin: Negative for rash and wound.   Neurological: Negative for seizures and syncope.   Hematological: Does not bruise/bleed easily.   Psychiatric/Behavioral: Negative for confusion.          Objective    Vitals:    10/22/18 1530   BP: 110/78   Pulse: 93   Resp: 14   Temp: 98.9 °F (37.2 °C)   SpO2: 100%  Comment: at rest   Weight: 71.4 kg (157 lb 6.4 oz)   Height: 158 cm (62.21\")  Comment: new ht.   PainSc: 0-No pain     Current Status 10/22/2018   ECOG score 0      PHYSICAL EXAM:    CONSTITUTIONAL:  Vital signs reviewed.  No distress, " looks comfortable.  EYES:  Conjunctiva and lids unremarkable.  PERRLA  EARS,NOSE,MOUTH,THROAT:  Ears and nose appear unremarkable.  Lips, teeth, gums appear unremarkable.  RESPIRATORY:  Normal respiratory effort.  Lungs clear to auscultation bilaterally.  CARDIOVASCULAR:  Normal S1, S2.  No murmurs rubs or gallops.  No significant lower extremity edema.  GASTROINTESTINAL: Abdomen appears unremarkable.  Nontender.  No hepatomegaly.  No splenomegaly.  LYMPHATIC:  No cervical, supraclavicular, axillary lymphadenopathy.  SKIN:  Warm.  No rashes.  PSYCHIATRIC:  Normal judgment and insight.  Normal mood and affect.      RECENT LABS:        WBC   Date/Time Value Ref Range Status   10/22/2018 03:07 PM 7.03 4.00 - 10.00 10*3/mm3 Final     Hemoglobin   Date/Time Value Ref Range Status   10/22/2018 03:07 PM 12.2 11.5 - 14.9 g/dL Final     Platelets   Date/Time Value Ref Range Status   10/22/2018 03:07  150 - 375 10*3/mm3 Final       Assessment/Plan     ASSESSMENT:  1. T2N1M0 (stage IIB) invasive ductal carcinoma of the right breast. [place] score 9 of 9 (high grade), triple-negative, 3.3 cm tumor. Right mastectomy and TRAM flap reconstruction. Completed dose-dense Adriamycin/Cytoxan with dose-dense Taxol 07/11/2013. Completed radiation October 2013.   I reminded her Again today we would be quite unusual for her cancer to recur this late.  There is a good probability she is cured of this cancer.    Remission continues.    2. Nausea.  IBS.  Managed by both Dr. Gallegos and Dr. Vandana Zavaleta.  Denies nausea today.    3. Intermittent blurry vision, left eye more so than right eye.  She has seen her ophthalmologist for this.  She states nothing concerning was found.  She follows with Dr. Kiran Cabrera of neurology for this.  A prior MRI read as possible optic neuritis.  She states the follow-up MRI was fine.  She did not complain of this today.    4.  Weight loss.  Resolved.  165 pounds October 2014.  150 pounds April 2015.  145  pounds January 2016.  140 pounds February 2016.  136 pounds April 1, 2016.  132 pounds April 25, 2016.  Gained, up to 139 pounds, 5/9/16.  Weight is been stable since then.    5.  2 liver lesions on 1/31/13 staging CAT scan.  Liver protocol CT subsequently read as benign liver lesions.  Liver lesions unchanged on CT 5/4/16.    6.  She has an annual  for breast cancer.      7.  Chronic sciatica and cervical pain.  Treatment through her PCP, Dr. Vandana Zavaleta.  As had epidurals.  Denies pain today.    8.  Left preauricular minimally enlarged node.  Follows with Dr. Tin Ordoñez of ENT.  The node does not feel malignant my exam.  I reviewed records from Dr. Ordoñez, last record I have reviewed his 4/6/18.  Patient follows with Dr. Ordoñez.    PLAN:   M.D. CBC CMP 6 months  Her port has been removed.   Last mammogram, 5/17/18, BI-RADS 2.  She is getting  in 2 weeks in [place].

## 2018-10-29 RX ORDER — CYCLOBENZAPRINE HCL 10 MG
TABLET ORAL
Qty: 30 TABLET | Refills: 0 | Status: SHIPPED | OUTPATIENT
Start: 2018-10-29 | End: 2018-12-07 | Stop reason: SDUPTHER

## 2018-11-05 DIAGNOSIS — K21.00 GASTROESOPHAGEAL REFLUX DISEASE WITH ESOPHAGITIS: ICD-10-CM

## 2018-11-05 DIAGNOSIS — R11.0 NAUSEA: ICD-10-CM

## 2018-11-05 RX ORDER — AMLODIPINE BESYLATE 5 MG/1
TABLET ORAL
Qty: 90 TABLET | Refills: 1 | Status: SHIPPED | OUTPATIENT
Start: 2018-11-05 | End: 2019-12-19

## 2018-11-05 RX ORDER — DEXLANSOPRAZOLE 60 MG/1
CAPSULE, DELAYED RELEASE ORAL
Qty: 90 CAPSULE | Refills: 0 | Status: SHIPPED | OUTPATIENT
Start: 2018-11-05 | End: 2019-05-07 | Stop reason: SDUPTHER

## 2018-11-06 ENCOUNTER — OFFICE VISIT (OUTPATIENT)
Dept: INTERNAL MEDICINE | Facility: CLINIC | Age: 53
End: 2018-11-06

## 2018-11-06 VITALS
RESPIRATION RATE: 16 BRPM | BODY MASS INDEX: 28.89 KG/M2 | OXYGEN SATURATION: 97 % | HEART RATE: 116 BPM | HEIGHT: 62 IN | WEIGHT: 157 LBS | SYSTOLIC BLOOD PRESSURE: 132 MMHG | DIASTOLIC BLOOD PRESSURE: 84 MMHG

## 2018-11-06 DIAGNOSIS — H00.13 CHALAZION OF BOTH EYES: Primary | ICD-10-CM

## 2018-11-06 DIAGNOSIS — H00.16 CHALAZION OF BOTH EYES: Primary | ICD-10-CM

## 2018-11-06 DIAGNOSIS — G47.00 INSOMNIA, UNSPECIFIED TYPE: ICD-10-CM

## 2018-11-06 PROCEDURE — G0008 ADMIN INFLUENZA VIRUS VAC: HCPCS | Performed by: INTERNAL MEDICINE

## 2018-11-06 PROCEDURE — 90674 CCIIV4 VAC NO PRSV 0.5 ML IM: CPT | Performed by: INTERNAL MEDICINE

## 2018-11-06 PROCEDURE — 99213 OFFICE O/P EST LOW 20 MIN: CPT | Performed by: INTERNAL MEDICINE

## 2018-11-06 NOTE — PROGRESS NOTES
"Chief Complaint   Patient presents with   • Stye     stye in left eye       History of Present Illness   Meenakshi Alcala is a 53 y.o. female presents for acute care. Patient reports new onset of a \"stye\" in her left eye. She has one on her right eye lid and two areas on the left lid at this time. She used ocuflox opthalmic solution on this and has not had resolution.   No visual change. No drainage. + discomfort.   Patient w/ insomnia. Doing well w/ trazodone.     The following portions of the patient's history were reviewed and updated as appropriate: allergies, current medications, past family history, past medical history, past social history, past surgical history and problem list.  Current Outpatient Prescriptions on File Prior to Visit   Medication Sig Dispense Refill   • amLODIPine (NORVASC) 5 MG tablet TAKE ONE TABLET BY MOUTH DAILY 90 tablet 1   • cyclobenzaprine (FLEXERIL) 10 MG tablet TAKE ONE TABLET BY MOUTH THREE TIMES A DAY AS NEEDED FOR MUSCLE SPASMS 30 tablet 0   • DEXILANT 60 MG capsule TAKE ONE CAPSULE BY MOUTH DAILY 90 capsule 0   • fluticasone (FLONASE) 50 MCG/ACT nasal spray PLACE TWO SPRAYS IN EACH NOSTRIL ONCE DAILY 16 each 3   • gabapentin (NEURONTIN) 100 MG capsule Take 1 capsule by mouth 3 (Three) Times a Day. 90 capsule 3   • LINZESS 145 MCG capsule TAKE ONE CAPSULE BY MOUTH DAILY 90 capsule 1   • meloxicam (MOBIC) 15 MG tablet Take 1 tablet by mouth Daily. 30 tablet 1   • ofloxacin (OCUFLOX) 0.3 % ophthalmic solution Administer 1 drop into the left eye 4 (Four) Times a Day. 5 mL 0   • PARoxetine (PAXIL) 20 MG tablet Take 1 tablet by mouth Every Morning. 30 tablet 5   • traMADol (ULTRAM) 50 MG tablet TAKE ONE TABLET BY MOUTH EVERY 6 HOURS AS NEEDED FOR MODERATE PAIN 30 tablet 2   • traZODone (DESYREL) 50 MG tablet Take 2 tablets by mouth Every Night. 60 tablet 5   • VENTOLIN  (90 Base) MCG/ACT inhaler INHALE TWO PUFFS BY MOUTH EVERY 6 HOURS 1 inhaler 5   • VOLTAREN 1 % gel gel " "APPLY FOUR GRAMS TO AFFECTED AREA(S) FOUR TIMES A  g 5     No current facility-administered medications on file prior to visit.      Review of Systems   Constitutional: Negative.    HENT: Negative.    Eyes:        Lid swelling   Respiratory: Negative.    Cardiovascular: Negative.    Gastrointestinal: Negative.    Endocrine: Negative.    Genitourinary: Negative.    Musculoskeletal: Negative.    Skin: Negative.    Allergic/Immunologic: Negative.    Neurological: Negative.    Hematological: Negative.    Psychiatric/Behavioral: Negative.        Objective   Physical Exam   Constitutional: She is oriented to person, place, and time. She appears well-developed and well-nourished.   HENT:   Head: Normocephalic and atraumatic.   Right Ear: Hearing, tympanic membrane and external ear normal.   Left Ear: Hearing, tympanic membrane and external ear normal.   Nose: Nose normal.   Mouth/Throat: Oropharynx is clear and moist.   Neck: Neck supple. No thyromegaly present.   Cardiovascular: Normal rate, regular rhythm and normal heart sounds.    No murmur heard.  Pulmonary/Chest: Effort normal and breath sounds normal. Right breast exhibits no mass. Left breast exhibits no mass.   Abdominal: Soft. She exhibits no distension. There is no hepatosplenomegaly. There is no tenderness.   Genitourinary: No breast tenderness.   Lymphadenopathy:     She has no cervical adenopathy.   Neurological: She is alert and oriented to person, place, and time.   Skin: Skin is warm and dry.   Lid swelling right upper lid and left upper lid x 2. No redness or drainage.    Psychiatric: She has a normal mood and affect. Her speech is normal and behavior is normal. Judgment and thought content normal. Cognition and memory are normal.        /84   Pulse 116   Resp 16   Ht 158 cm (62.21\")   Wt 71.2 kg (157 lb)   SpO2 97%   BMI 28.52 kg/m²     Assessment/Plan   Diagnoses and all orders for this visit:    Chalazion of both eyes  -     " Ambulatory Referral to Ophthalmology    Insomnia, unspecified type      Patient with B chalazion. No infected appearance at this time. To use natural tear lubrication and will refer to ophthalmology for definitive treatment. Continue trazodone for sleep. Flu vac today.   F/u routinely.

## 2018-12-07 RX ORDER — CYCLOBENZAPRINE HCL 10 MG
TABLET ORAL
Qty: 30 TABLET | Refills: 0 | Status: SHIPPED | OUTPATIENT
Start: 2018-12-07 | End: 2019-02-02 | Stop reason: SDUPTHER

## 2019-01-11 RX ORDER — PAROXETINE HYDROCHLORIDE 20 MG/1
TABLET, FILM COATED ORAL
Qty: 30 TABLET | Refills: 4 | Status: SHIPPED | OUTPATIENT
Start: 2019-01-11 | End: 2019-02-22 | Stop reason: DRUGHIGH

## 2019-02-04 RX ORDER — CYCLOBENZAPRINE HCL 10 MG
TABLET ORAL
Qty: 30 TABLET | Refills: 0 | Status: SHIPPED | OUTPATIENT
Start: 2019-02-04 | End: 2019-02-05 | Stop reason: SDUPTHER

## 2019-02-04 NOTE — TELEPHONE ENCOUNTER
Ok to refill  Reiterate this is meant to be a temporary med so if continued pain we should seek alternative treatment options.

## 2019-02-05 RX ORDER — CYCLOBENZAPRINE HCL 10 MG
10 TABLET ORAL 2 TIMES DAILY PRN
Qty: 30 TABLET | Refills: 0 | Status: SHIPPED | OUTPATIENT
Start: 2019-02-05 | End: 2019-07-23 | Stop reason: SDUPTHER

## 2019-02-07 RX ORDER — ALBUTEROL SULFATE 90 UG/1
AEROSOL, METERED RESPIRATORY (INHALATION)
Qty: 1 INHALER | Refills: 4 | Status: SHIPPED | OUTPATIENT
Start: 2019-02-07 | End: 2020-05-29 | Stop reason: SDUPTHER

## 2019-02-22 ENCOUNTER — OFFICE VISIT (OUTPATIENT)
Dept: INTERNAL MEDICINE | Facility: CLINIC | Age: 54
End: 2019-02-22

## 2019-02-22 VITALS
DIASTOLIC BLOOD PRESSURE: 70 MMHG | WEIGHT: 169 LBS | SYSTOLIC BLOOD PRESSURE: 124 MMHG | OXYGEN SATURATION: 100 % | HEIGHT: 62 IN | BODY MASS INDEX: 31.1 KG/M2 | HEART RATE: 98 BPM | RESPIRATION RATE: 16 BRPM

## 2019-02-22 DIAGNOSIS — R07.89 CHEST PRESSURE: Primary | ICD-10-CM

## 2019-02-22 DIAGNOSIS — M94.0 COSTOCHONDRITIS: ICD-10-CM

## 2019-02-22 DIAGNOSIS — Z12.31 BREAST CANCER SCREENING BY MAMMOGRAM: ICD-10-CM

## 2019-02-22 PROCEDURE — 71046 X-RAY EXAM CHEST 2 VIEWS: CPT | Performed by: INTERNAL MEDICINE

## 2019-02-22 PROCEDURE — 99214 OFFICE O/P EST MOD 30 MIN: CPT | Performed by: INTERNAL MEDICINE

## 2019-02-22 RX ORDER — PAROXETINE 30 MG/1
30 TABLET, FILM COATED ORAL EVERY MORNING
Qty: 90 TABLET | Refills: 1 | Status: SHIPPED | OUTPATIENT
Start: 2019-02-22 | End: 2019-06-06 | Stop reason: SDUPTHER

## 2019-02-22 NOTE — PROGRESS NOTES
Chief Complaint   Patient presents with   • Chest Pain     back pain between shoulder blades and chest pressure, x 3 weeks,    irritability  insomnia  Nausea    History of Present Illness   Meenakshi Mae is a 53 y.o. female presents for acute care with new needs. She reports a recent chest discomfort. Described as heaviness. Present throughout the day. She reports there is no change with activity. Mild shortness of air. She has been using an inhaler. Some nausea. She has been eating more red meat. She has increased caffeine consumption. 1-2 cups of coffee daily. Weight gain of 12 pounds in last 3 months.     Additional complaint of easy irritability. She feels hot and flushed.   The following portions of the patient's history were reviewed and updated as appropriate: allergies, current medications, past family history, past medical history, past social history, past surgical history and problem list.  Current Outpatient Medications on File Prior to Visit   Medication Sig Dispense Refill   • amLODIPine (NORVASC) 5 MG tablet TAKE ONE TABLET BY MOUTH DAILY 90 tablet 1   • cyclobenzaprine (FLEXERIL) 10 MG tablet Take 1 tablet by mouth 2 (Two) Times a Day As Needed for Muscle Spasms. 30 tablet 0   • DEXILANT 60 MG capsule TAKE ONE CAPSULE BY MOUTH DAILY 90 capsule 0   • fluticasone (FLONASE) 50 MCG/ACT nasal spray PLACE TWO SPRAYS IN EACH NOSTRIL ONCE DAILY 16 each 3   • gabapentin (NEURONTIN) 100 MG capsule Take 1 capsule by mouth 3 (Three) Times a Day. 90 capsule 3   • LINZESS 145 MCG capsule TAKE ONE CAPSULE BY MOUTH DAILY 90 capsule 1   • meloxicam (MOBIC) 15 MG tablet Take 1 tablet by mouth Daily. 30 tablet 1   • ofloxacin (OCUFLOX) 0.3 % ophthalmic solution Administer 1 drop into the left eye 4 (Four) Times a Day. 5 mL 0   • traMADol (ULTRAM) 50 MG tablet TAKE ONE TABLET BY MOUTH EVERY 6 HOURS AS NEEDED FOR MODERATE PAIN 30 tablet 2   • traZODone (DESYREL) 50 MG tablet Take 2 tablets by mouth Every Night. 60  tablet 5   • VENTOLIN  (90 Base) MCG/ACT inhaler INHALE TWO PUFFS BY MOUTH EVERY 6 HOURS 1 inhaler 4   • VOLTAREN 1 % gel gel APPLY FOUR GRAMS TO AFFECTED AREA(S) FOUR TIMES A  g 5   • [DISCONTINUED] PARoxetine (PAXIL) 20 MG tablet TAKE ONE TABLET BY MOUTH EVERY MORNING 30 tablet 4     No current facility-administered medications on file prior to visit.      Review of Systems   Constitutional: Negative.    HENT: Negative.    Eyes: Negative.    Respiratory: Negative.    Cardiovascular: Positive for chest pain. Negative for palpitations and leg swelling.   Gastrointestinal: Negative.    Endocrine: Negative.    Genitourinary: Negative.    Musculoskeletal: Positive for arthralgias.   Skin: Negative.    Allergic/Immunologic: Negative.    Neurological: Negative.    Hematological: Negative.    Psychiatric/Behavioral: Negative.        Objective   Physical Exam   Constitutional: She is oriented to person, place, and time. She appears well-developed and well-nourished.   HENT:   Head: Normocephalic and atraumatic.   Right Ear: External ear normal.   Left Ear: External ear normal.   Nose: Nose normal.   Mouth/Throat: Oropharynx is clear and moist.   Eyes: EOM are normal. Pupils are equal, round, and reactive to light.   Neck: Neck supple.   Cardiovascular: Normal rate, regular rhythm and normal heart sounds.   Reproducible anterior chest wall pain.    Pulmonary/Chest: Effort normal and breath sounds normal. No respiratory distress.   Abdominal: Soft. Bowel sounds are normal.   Musculoskeletal: Normal range of motion.   Discomfort to palpation anterior chest wall.    Neurological: She is alert and oriented to person, place, and time.   Skin: Skin is warm and dry.   Psychiatric: She has a normal mood and affect. Her behavior is normal. Judgment and thought content normal.   Nursing note and vitals reviewed.   EKG for chest pain. Sinus. No st changes. Unchanged from prior.   cxr no acute disease identified.       BP  "124/70   Pulse 98   Resp 16   Ht 157.5 cm (62\")   Wt 76.7 kg (169 lb)   SpO2 100%   BMI 30.91 kg/m²     Assessment/Plan   Diagnoses and all orders for this visit:    Chest pressure  -     XR Chest PA & Lateral (In Office)  -     ECG 12 Lead    Costochondritis    Breast cancer screening by mammogram  -     Mammo screening digital tomosynthesis bilateral w CAD; Future    Other orders  -     PARoxetine (PAXIL) 30 MG tablet; Take 1 tablet by mouth Every Morning.    patient w/ acute costochonditis. She will start meloxicam one tablet daily. She will avoid upper body activities until pain has improved. She has a h/o breast cancer and is due for a screening mammogram. This has been scheduled. Her mood has been irritable. Will increase paxil to 30 mg daily. Gentle stretching. F/u routinely                "

## 2019-03-17 DIAGNOSIS — I10 ESSENTIAL HYPERTENSION: Primary | ICD-10-CM

## 2019-03-17 DIAGNOSIS — Z00.00 HEALTHCARE MAINTENANCE: ICD-10-CM

## 2019-03-19 LAB
ALBUMIN SERPL-MCNC: 4.6 G/DL (ref 3.5–5.2)
ALBUMIN/GLOB SERPL: 1.8 G/DL
ALP SERPL-CCNC: 121 U/L (ref 39–117)
ALT SERPL-CCNC: 15 U/L (ref 1–33)
AST SERPL-CCNC: 15 U/L (ref 1–32)
BASOPHILS # BLD AUTO: 0.05 10*3/MM3 (ref 0–0.2)
BASOPHILS NFR BLD AUTO: 0.8 % (ref 0–1.5)
BILIRUB SERPL-MCNC: 0.4 MG/DL (ref 0.2–1.2)
BUN SERPL-MCNC: 10 MG/DL (ref 6–20)
BUN/CREAT SERPL: 16.7 (ref 7–25)
CALCIUM SERPL-MCNC: 9.8 MG/DL (ref 8.6–10.5)
CHLORIDE SERPL-SCNC: 102 MMOL/L (ref 98–107)
CHOLEST SERPL-MCNC: 222 MG/DL (ref 0–200)
CO2 SERPL-SCNC: 28.2 MMOL/L (ref 22–29)
CREAT SERPL-MCNC: 0.6 MG/DL (ref 0.57–1)
EOSINOPHIL # BLD AUTO: 0.12 10*3/MM3 (ref 0–0.4)
EOSINOPHIL NFR BLD AUTO: 2 % (ref 0.3–6.2)
ERYTHROCYTE [DISTWIDTH] IN BLOOD BY AUTOMATED COUNT: 14.4 % (ref 12.3–15.4)
GLOBULIN SER CALC-MCNC: 2.5 GM/DL
GLUCOSE SERPL-MCNC: 92 MG/DL (ref 65–99)
HCT VFR BLD AUTO: 40.3 % (ref 34–46.6)
HDLC SERPL-MCNC: 63 MG/DL (ref 40–60)
HGB BLD-MCNC: 12.5 G/DL (ref 12–15.9)
IMM GRANULOCYTES # BLD AUTO: 0.01 10*3/MM3 (ref 0–0.05)
IMM GRANULOCYTES NFR BLD AUTO: 0.2 % (ref 0–0.5)
LDLC SERPL CALC-MCNC: 143 MG/DL (ref 0–100)
LDLC/HDLC SERPL: 2.27 {RATIO}
LYMPHOCYTES # BLD AUTO: 2.58 10*3/MM3 (ref 0.7–3.1)
LYMPHOCYTES NFR BLD AUTO: 43.1 % (ref 19.6–45.3)
MCH RBC QN AUTO: 27 PG (ref 26.6–33)
MCHC RBC AUTO-ENTMCNC: 31 G/DL (ref 31.5–35.7)
MCV RBC AUTO: 87 FL (ref 79–97)
MONOCYTES # BLD AUTO: 0.51 10*3/MM3 (ref 0.1–0.9)
MONOCYTES NFR BLD AUTO: 8.5 % (ref 5–12)
NEUTROPHILS # BLD AUTO: 2.71 10*3/MM3 (ref 1.4–7)
NEUTROPHILS NFR BLD AUTO: 45.4 % (ref 42.7–76)
NRBC BLD AUTO-RTO: 0 /100 WBC (ref 0–0)
PLATELET # BLD AUTO: 320 10*3/MM3 (ref 140–450)
POTASSIUM SERPL-SCNC: 4.2 MMOL/L (ref 3.5–5.2)
PROT SERPL-MCNC: 7.1 G/DL (ref 6–8.5)
RBC # BLD AUTO: 4.63 10*6/MM3 (ref 3.77–5.28)
SODIUM SERPL-SCNC: 143 MMOL/L (ref 136–145)
TRIGL SERPL-MCNC: 81 MG/DL (ref 0–150)
TSH SERPL DL<=0.005 MIU/L-ACNC: 0.7 MIU/ML (ref 0.27–4.2)
VLDLC SERPL CALC-MCNC: 16.2 MG/DL (ref 5–40)
WBC # BLD AUTO: 5.98 10*3/MM3 (ref 3.4–10.8)

## 2019-03-22 ENCOUNTER — OFFICE VISIT (OUTPATIENT)
Dept: INTERNAL MEDICINE | Facility: CLINIC | Age: 54
End: 2019-03-22

## 2019-03-22 VITALS
SYSTOLIC BLOOD PRESSURE: 130 MMHG | OXYGEN SATURATION: 94 % | WEIGHT: 170 LBS | HEART RATE: 95 BPM | BODY MASS INDEX: 26.68 KG/M2 | DIASTOLIC BLOOD PRESSURE: 74 MMHG | HEIGHT: 67 IN

## 2019-03-22 DIAGNOSIS — Z00.00 HEALTHCARE MAINTENANCE: Primary | ICD-10-CM

## 2019-03-22 DIAGNOSIS — C50.411 MALIGNANT NEOPLASM OF UPPER-OUTER QUADRANT OF RIGHT FEMALE BREAST, UNSPECIFIED ESTROGEN RECEPTOR STATUS (HCC): ICD-10-CM

## 2019-03-22 DIAGNOSIS — M94.0 COSTOCHONDRITIS: ICD-10-CM

## 2019-03-22 DIAGNOSIS — E78.5 HYPERLIPIDEMIA, UNSPECIFIED HYPERLIPIDEMIA TYPE: ICD-10-CM

## 2019-03-22 DIAGNOSIS — Z82.49 FAMILY HISTORY OF EARLY CAD: ICD-10-CM

## 2019-03-22 DIAGNOSIS — N64.4 MASTALGIA: ICD-10-CM

## 2019-03-22 PROCEDURE — 99214 OFFICE O/P EST MOD 30 MIN: CPT | Performed by: INTERNAL MEDICINE

## 2019-03-22 PROCEDURE — G0439 PPPS, SUBSEQ VISIT: HCPCS | Performed by: INTERNAL MEDICINE

## 2019-03-22 RX ORDER — GABAPENTIN 100 MG/1
100 CAPSULE ORAL NIGHTLY
Qty: 90 CAPSULE | Refills: 3 | Status: SHIPPED | OUTPATIENT
Start: 2019-03-22 | End: 2019-07-23

## 2019-03-22 NOTE — PATIENT INSTRUCTIONS
Costochondritis  Costochondritis is swelling and irritation (inflammation) of the tissue (cartilage) that connects your ribs to your breastbone (sternum). This causes pain in the front of your chest. The pain usually starts gradually and involves more than one rib.  What are the causes?  The exact cause of this condition is not always known. It results from stress on the cartilage where your ribs attach to your sternum. The cause of this stress could be:  · Chest injury (trauma).  · Exercise or activity, such as lifting.  · Severe coughing.    What increases the risk?  You may be at higher risk for this condition if you:  · Are female.  · Are 30?40 years old.  · Recently started a new exercise or work activity.  · Have low levels of vitamin D.  · Have a condition that makes you cough frequently.    What are the signs or symptoms?  The main symptom of this condition is chest pain. The pain:  · Usually starts gradually and can be sharp or dull.  · Gets worse with deep breathing, coughing, or exercise.  · Gets better with rest.  · May be worse when you press on the sternum-rib connection (tenderness).    How is this diagnosed?  This condition is diagnosed based on your symptoms, medical history, and a physical exam. Your health care provider will check for tenderness when pressing on your sternum. This is the most important finding. You may also have tests to rule out other causes of chest pain. These may include:  · A chest X-ray to check for lung problems.  · An electrocardiogram (ECG) to see if you have a heart problem that could be causing the pain.  · An imaging scan to rule out a chest or rib fracture.    How is this treated?  This condition usually goes away on its own over time. Your health care provider may prescribe an NSAID to reduce pain and inflammation. Your health care provider may also suggest that you:  · Rest and avoid activities that make pain worse.  · Apply heat or cold to the area to reduce pain  and inflammation.  · Do exercises to stretch your chest muscles.    If these treatments do not help, your health care provider may inject a numbing medicine at the sternum-rib connection to help relieve the pain.  Follow these instructions at home:  · Avoid activities that make pain worse. This includes any activities that use chest, abdominal, and side muscles.  · If directed, put ice on the painful area:  ? Put ice in a plastic bag.  ? Place a towel between your skin and the bag.  ? Leave the ice on for 20 minutes, 2-3 times a day.  · If directed, apply heat to the affected area as often as told by your health care provider. Use the heat source that your health care provider recommends, such as a moist heat pack or a heating pad.  ? Place a towel between your skin and the heat source.  ? Leave the heat on for 20-30 minutes.  ? Remove the heat if your skin turns bright red. This is especially important if you are unable to feel pain, heat, or cold. You may have a greater risk of getting burned.  · Take over-the-counter and prescription medicines only as told by your health care provider.  · Return to your normal activities as told by your health care provider. Ask your health care provider what activities are safe for you.  · Keep all follow-up visits as told by your health care provider. This is important.  Contact a health care provider if:  · You have chills or a fever.  · Your pain does not go away or it gets worse.  · You have a cough that does not go away (is persistent).  Get help right away if:  · You have shortness of breath.  This information is not intended to replace advice given to you by your health care provider. Make sure you discuss any questions you have with your health care provider.  Document Released: 09/27/2006 Document Revised: 09/19/2018 Document Reviewed: 04/12/2017  [employer] Interactive Patient Education © 2019 [employer]

## 2019-03-22 NOTE — PROGRESS NOTES
Subjective   AWV  Hypertension  JONAH   Anxiety  Musculoskeletal discomfort  H/o breast cancer    Meenakshi Mae is a 53 y.o. female who presents for an annual wellness visit. In general she is doing well today. She does have some anterior chest wall pain. This has improved from her last visit. She does still note it periodically. Normal chest xray. Normal ekg. She does note increased symptoms after using her vacuum which is heavy.   She has a history of JONAH. This is   She has anxiety and notes some increased flushing related to menopause. She is on paroxetine at 30 mg and reports that has helped w/ the mood but not as much w/ the flushing. She has gabapentin. This made her feel over medicated. She has not tried this in the evening.   Patient has a history of breast ca. She is following routinely w/ her oncologist for this.         Review of Systems   Constitutional: Positive for fatigue.   HENT: Negative.    Eyes: Negative.    Respiratory: Negative.    Cardiovascular: Negative.    Gastrointestinal: Negative.    Endocrine: Negative.    Genitourinary: Negative.    Musculoskeletal: Negative.    Skin: Negative.    Allergic/Immunologic: Negative.    Neurological: Negative.    Hematological: Negative.    Psychiatric/Behavioral: Negative.        The following portions of the patient's history were reviewed and updated as appropriate: allergies, current medications, past family history, past medical history, past social history, past surgical history and problem list.     Patient Active Problem List   Diagnosis   • Chronic constipation   • Nausea   • Gastroesophageal reflux disease with esophagitis   • Anxiety   • Essential hypertension   • Malignant neoplasm of upper-outer quadrant of right female breast (CMS/HCC)   • Insect bite   • Atopic rhinitis   • Depression   • Hematuria   • Neuropathy   • Reactive airway disease   • Vitamin D deficiency   • Healthcare maintenance   • Osteopenia   • Bulge of lumbar disc without  myelopathy   • Lumbar facet arthropathy   • Synovial cyst of lumbar facet joint       Past Medical History:   Diagnosis Date   • Anemia    • H/O transfusion of packed red blood cells    • Headache    • Malignant neoplasm of breast (CMS/HCC) 2013    Right, T2N1M0, Stage IIB       Past Surgical History:   Procedure Laterality Date   • BREAST BIOPSY     • HYSTERECTOMY  2008   • MASTECTOMY MODIFIED RADICAL Right 02/21/2013       Family History   Problem Relation Age of Onset   • Heart disease Mother    • Hypertension Mother    • Stomach cancer Father    • Ovarian cancer Maternal Aunt    • Stomach cancer Maternal Aunt        Social History     Socioeconomic History   • Marital status:      Spouse name: Not on file   • Number of children: Not on file   • Years of education: College   • Highest education level: Not on file   Occupational History   • Occupation: [employer]     Employer: [employer]   Tobacco Use   • Smoking status: Former Smoker     Packs/day: 1.00     Years: 25.00     Pack years: 25.00     Types: Cigarettes   • Smokeless tobacco: Former User   Substance and Sexual Activity   • Alcohol use: Yes     Comment: Occasional   • Drug use: No       Current Outpatient Medications on File Prior to Visit   Medication Sig Dispense Refill   • amLODIPine (NORVASC) 5 MG tablet TAKE ONE TABLET BY MOUTH DAILY 90 tablet 1   • cyclobenzaprine (FLEXERIL) 10 MG tablet Take 1 tablet by mouth 2 (Two) Times a Day As Needed for Muscle Spasms. 30 tablet 0   • DEXILANT 60 MG capsule TAKE ONE CAPSULE BY MOUTH DAILY 90 capsule 0   • fluticasone (FLONASE) 50 MCG/ACT nasal spray PLACE TWO SPRAYS IN EACH NOSTRIL ONCE DAILY 16 each 3   • LINZESS 145 MCG capsule TAKE ONE CAPSULE BY MOUTH DAILY 90 capsule 1   • meloxicam (MOBIC) 15 MG tablet Take 1 tablet by mouth Daily. 30 tablet 1   • ofloxacin (OCUFLOX) 0.3 % ophthalmic solution Administer 1 drop into the left eye 4 (Four) Times a Day. 5 mL 0   • PARoxetine (PAXIL) 30 MG  "tablet Take 1 tablet by mouth Every Morning. 90 tablet 1   • traMADol (ULTRAM) 50 MG tablet TAKE ONE TABLET BY MOUTH EVERY 6 HOURS AS NEEDED FOR MODERATE PAIN 30 tablet 2   • traZODone (DESYREL) 50 MG tablet Take 2 tablets by mouth Every Night. 60 tablet 5   • VENTOLIN  (90 Base) MCG/ACT inhaler INHALE TWO PUFFS BY MOUTH EVERY 6 HOURS 1 inhaler 4   • VOLTAREN 1 % gel gel APPLY FOUR GRAMS TO AFFECTED AREA(S) FOUR TIMES A  g 5   • [DISCONTINUED] gabapentin (NEURONTIN) 100 MG capsule Take 1 capsule by mouth 3 (Three) Times a Day. 90 capsule 3     No current facility-administered medications on file prior to visit.        Allergies   Allergen Reactions   • Erythromycin        Immunization History   Administered Date(s) Administered   • Flucelvax Quad Vial =>4yrs 11/06/2018   • Hepatitis A 10/05/2018   • Pneumococcal, Unspecified 09/30/2013   • Tdap 09/30/2013       Objective     /74   Pulse 95   Ht 170.2 cm (67\")   Wt 77.1 kg (170 lb)   SpO2 94%   BMI 26.63 kg/m²     Physical Exam   Constitutional: She is oriented to person, place, and time. She appears well-developed and well-nourished.   HENT:   Head: Normocephalic and atraumatic.   Right Ear: External ear normal.   Left Ear: External ear normal.   Nose: Nose normal.   Mouth/Throat: Oropharynx is clear and moist.   Eyes: Conjunctivae and EOM are normal. Pupils are equal, round, and reactive to light.   Neck: Normal range of motion. Neck supple.   Cardiovascular: Normal rate, regular rhythm, normal heart sounds and intact distal pulses.   Pulmonary/Chest: Effort normal and breath sounds normal. No respiratory distress. Left breast exhibits tenderness.       Abdominal: Soft. Bowel sounds are normal.   Musculoskeletal: Normal range of motion.   Neurological: She is alert and oriented to person, place, and time.   Skin: Skin is warm and dry.   Psychiatric: She has a normal mood and affect. Her behavior is normal. Judgment and thought content " normal.   Nursing note and vitals reviewed.      Assessment/Plan   Meenakshi was seen today for annual exam.    Diagnoses and all orders for this visit:    Healthcare maintenance    Mastalgia  -     Mammo diagnostic digital tomosynthesis left w CAD    Malignant neoplasm of upper-outer quadrant of right female breast, unspecified estrogen receptor status (CMS/HCC)  -     Mammo diagnostic digital tomosynthesis left w CAD    Hyperlipidemia, unspecified hyperlipidemia type  -     CT Cardiac Calcium Score Without Dye; Future    Family history of early CAD  -     CT Cardiac Calcium Score Without Dye; Future    Costochondritis    Other orders  -     gabapentin (NEURONTIN) 100 MG capsule; Take 1 capsule by mouth Every Night.        Discussion    AWV.     Direct observation of cognitive ability was evaluated and is normal. Patient was given health advice or handouts on the following:  nutrition, fall prevention, exercise, weight management. Patient with history of breast ca now w/ left chest wall tenderness. She will try alleve one tab twice daily. She will have a diagnostic mammogram. She has hot flash and will continue paxil and will try gabapentin in the evening. She will have a ct cardiac calcium score to evaluate given elevated lipids. She will eat a low fat diet w/ routine fitness. She will f/u rotuinely.                Future Appointments   Date Time Provider Department Center   4/22/2019  2:30 PM LAB CHAIR 4 [facility]  [facility]   4/22/2019  3:00 PM Code, MD CL[facility]  [facility]

## 2019-03-28 ENCOUNTER — HOSPITAL ENCOUNTER (OUTPATIENT)
Dept: ULTRASOUND IMAGING | Facility: HOSPITAL | Age: 54
Discharge: HOME | End: 2019-03-28

## 2019-03-28 ENCOUNTER — HOSPITAL ENCOUNTER (OUTPATIENT)
Dept: MAMMOGRAPHY | Facility: HOSPITAL | Age: 54
Discharge: HOME | End: 2019-03-28
Admitting: INTERNAL MEDICINE

## 2019-03-28 DIAGNOSIS — N64.4 MASTALGIA: ICD-10-CM

## 2019-03-28 DIAGNOSIS — C50.411 MALIGNANT NEOPLASM OF UPPER-OUTER QUADRANT OF RIGHT FEMALE BREAST, UNSPECIFIED ESTROGEN RECEPTOR STATUS (HCC): ICD-10-CM

## 2019-03-28 PROCEDURE — G0279 TOMOSYNTHESIS, MAMMO: HCPCS

## 2019-03-28 PROCEDURE — 76642 ULTRASOUND BREAST LIMITED: CPT

## 2019-03-28 PROCEDURE — 77066 DX MAMMO INCL CAD BI: CPT

## 2019-04-08 RX ORDER — METHYLPREDNISOLONE 4 MG/1
TABLET ORAL
Qty: 21 EACH | Refills: 0 | Status: SHIPPED | OUTPATIENT
Start: 2019-04-08 | End: 2019-07-23

## 2019-04-08 RX ORDER — ROSUVASTATIN CALCIUM 10 MG/1
10 TABLET, COATED ORAL DAILY
Qty: 30 TABLET | Refills: 5 | Status: SHIPPED | OUTPATIENT
Start: 2019-04-08 | End: 2019-12-19

## 2019-04-22 ENCOUNTER — OFFICE VISIT (OUTPATIENT)
Dept: ONCOLOGY | Facility: CLINIC | Age: 54
End: 2019-04-22

## 2019-04-22 ENCOUNTER — LAB (OUTPATIENT)
Dept: LAB | Facility: HOSPITAL | Age: 54
End: 2019-04-22

## 2019-04-22 VITALS
SYSTOLIC BLOOD PRESSURE: 130 MMHG | HEIGHT: 62 IN | WEIGHT: 175.2 LBS | OXYGEN SATURATION: 98 % | DIASTOLIC BLOOD PRESSURE: 80 MMHG | BODY MASS INDEX: 32.24 KG/M2 | TEMPERATURE: 98.2 F | HEART RATE: 87 BPM | RESPIRATION RATE: 16 BRPM

## 2019-04-22 DIAGNOSIS — C50.411 MALIGNANT NEOPLASM OF UPPER-OUTER QUADRANT OF RIGHT BREAST IN FEMALE, ESTROGEN RECEPTOR NEGATIVE (HCC): Primary | ICD-10-CM

## 2019-04-22 DIAGNOSIS — C50.411 MALIGNANT NEOPLASM OF UPPER-OUTER QUADRANT OF RIGHT BREAST IN FEMALE, ESTROGEN RECEPTOR NEGATIVE (HCC): ICD-10-CM

## 2019-04-22 DIAGNOSIS — Z17.1 MALIGNANT NEOPLASM OF UPPER-OUTER QUADRANT OF RIGHT BREAST IN FEMALE, ESTROGEN RECEPTOR NEGATIVE (HCC): Primary | ICD-10-CM

## 2019-04-22 DIAGNOSIS — Z17.1 MALIGNANT NEOPLASM OF UPPER-OUTER QUADRANT OF RIGHT BREAST IN FEMALE, ESTROGEN RECEPTOR NEGATIVE (HCC): ICD-10-CM

## 2019-04-22 LAB
ALBUMIN SERPL-MCNC: 4.3 G/DL (ref 3.5–5.2)
ALBUMIN/GLOB SERPL: 1.7 G/DL (ref 1.1–2.4)
ALP SERPL-CCNC: 117 U/L (ref 38–116)
ALT SERPL W P-5'-P-CCNC: 19 U/L (ref 0–33)
ANION GAP SERPL CALCULATED.3IONS-SCNC: 11.2 MMOL/L
AST SERPL-CCNC: 19 U/L (ref 0–32)
BASOPHILS # BLD AUTO: 0.04 10*3/MM3 (ref 0–0.2)
BASOPHILS NFR BLD AUTO: 0.5 % (ref 0–1.5)
BILIRUB SERPL-MCNC: 0.4 MG/DL (ref 0.2–1.2)
BUN BLD-MCNC: 8 MG/DL (ref 6–20)
BUN/CREAT SERPL: 12.3 (ref 7.3–30)
CALCIUM SPEC-SCNC: 9.4 MG/DL (ref 8.5–10.2)
CHLORIDE SERPL-SCNC: 103 MMOL/L (ref 98–107)
CO2 SERPL-SCNC: 28.8 MMOL/L (ref 22–29)
CREAT BLD-MCNC: 0.65 MG/DL (ref 0.6–1.1)
DEPRECATED RDW RBC AUTO: 45.1 FL (ref 37–54)
EOSINOPHIL # BLD AUTO: 0.12 10*3/MM3 (ref 0–0.4)
EOSINOPHIL NFR BLD AUTO: 1.6 % (ref 0.3–6.2)
ERYTHROCYTE [DISTWIDTH] IN BLOOD BY AUTOMATED COUNT: 14.5 % (ref 12.3–15.4)
GFR SERPL CREATININE-BSD FRML MDRD: 115 ML/MIN/1.73
GLOBULIN UR ELPH-MCNC: 2.6 GM/DL (ref 1.8–3.5)
GLUCOSE BLD-MCNC: 102 MG/DL (ref 74–124)
HCT VFR BLD AUTO: 37.5 % (ref 34–46.6)
HGB BLD-MCNC: 12 G/DL (ref 12–15.9)
IMM GRANULOCYTES # BLD AUTO: 0.02 10*3/MM3 (ref 0–0.05)
IMM GRANULOCYTES NFR BLD AUTO: 0.3 % (ref 0–0.5)
LYMPHOCYTES # BLD AUTO: 3.21 10*3/MM3 (ref 0.7–3.1)
LYMPHOCYTES NFR BLD AUTO: 43.2 % (ref 19.6–45.3)
MCH RBC QN AUTO: 27.4 PG (ref 26.6–33)
MCHC RBC AUTO-ENTMCNC: 32 G/DL (ref 31.5–35.7)
MCV RBC AUTO: 85.6 FL (ref 79–97)
MONOCYTES # BLD AUTO: 0.57 10*3/MM3 (ref 0.1–0.9)
MONOCYTES NFR BLD AUTO: 7.7 % (ref 5–12)
NEUTROPHILS # BLD AUTO: 3.47 10*3/MM3 (ref 1.7–7)
NEUTROPHILS NFR BLD AUTO: 46.7 % (ref 42.7–76)
NRBC BLD AUTO-RTO: 0 /100 WBC (ref 0–0.2)
PLATELET # BLD AUTO: 252 10*3/MM3 (ref 140–450)
PMV BLD AUTO: 8.7 FL (ref 6–12)
POTASSIUM BLD-SCNC: 4 MMOL/L (ref 3.5–4.7)
PROT SERPL-MCNC: 6.9 G/DL (ref 6.3–8)
RBC # BLD AUTO: 4.38 10*6/MM3 (ref 3.77–5.28)
SODIUM BLD-SCNC: 143 MMOL/L (ref 134–145)
WBC NRBC COR # BLD: 7.43 10*3/MM3 (ref 3.4–10.8)

## 2019-04-22 PROCEDURE — 36415 COLL VENOUS BLD VENIPUNCTURE: CPT | Performed by: INTERNAL MEDICINE

## 2019-04-22 PROCEDURE — 99214 OFFICE O/P EST MOD 30 MIN: CPT | Performed by: INTERNAL MEDICINE

## 2019-04-22 PROCEDURE — 85025 COMPLETE CBC W/AUTO DIFF WBC: CPT | Performed by: INTERNAL MEDICINE

## 2019-04-22 PROCEDURE — 80053 COMPREHEN METABOLIC PANEL: CPT | Performed by: INTERNAL MEDICINE

## 2019-04-22 NOTE — PROGRESS NOTES
Subjective .     REASONS FOR FOLLOWUP:  Breast cancer    HISTORY OF PRESENT ILLNESS:  The patient is a 53 y.o. year old female  who is here for follow-up with the above-mentioned history.    For the past 4 or 5 months has had chest pain when she bends over.  She thinks whenever she bends over to create pressure on her chest this pain recurs.  It is getting slightly worse, certainly no better.    Denies shortness of breath.  Denies nausea.  Denies weight loss.  In fact, she has been gaining weight.  Weight 157 pounds on 11/6/18.  Weighs 175 pounds now.    Past Medical History:   Diagnosis Date   • Anemia    • Depression    • H/O transfusion of packed red blood cells    • Headache    • History of low back pain    • Malignant neoplasm of breast (CMS/HCC) 2013    Right, T2N1M0, Stage IIB       HEMATOLOGIC/ONCOLOGIC HISTORY:  (History from previous dates can be found in the separate document.)    MEDICATIONS    Current Outpatient Medications:   •  amLODIPine (NORVASC) 5 MG tablet, TAKE ONE TABLET BY MOUTH DAILY, Disp: 90 tablet, Rfl: 1  •  cyclobenzaprine (FLEXERIL) 10 MG tablet, Take 1 tablet by mouth 2 (Two) Times a Day As Needed for Muscle Spasms., Disp: 30 tablet, Rfl: 0  •  DEXILANT 60 MG capsule, TAKE ONE CAPSULE BY MOUTH DAILY, Disp: 90 capsule, Rfl: 0  •  fluticasone (FLONASE) 50 MCG/ACT nasal spray, PLACE TWO SPRAYS IN EACH NOSTRIL ONCE DAILY, Disp: 16 each, Rfl: 3  •  gabapentin (NEURONTIN) 100 MG capsule, Take 1 capsule by mouth Every Night., Disp: 90 capsule, Rfl: 3  •  LINZESS 145 MCG capsule, TAKE ONE CAPSULE BY MOUTH DAILY, Disp: 90 capsule, Rfl: 1  •  meloxicam (MOBIC) 15 MG tablet, Take 1 tablet by mouth Daily., Disp: 30 tablet, Rfl: 1  •  PARoxetine (PAXIL) 30 MG tablet, Take 1 tablet by mouth Every Morning., Disp: 90 tablet, Rfl: 1  •  rosuvastatin (CRESTOR) 10 MG tablet, Take 1 tablet by mouth Daily., Disp: 30 tablet, Rfl: 5  •  traMADol (ULTRAM) 50 MG tablet, TAKE ONE TABLET BY MOUTH EVERY 6 HOURS  AS NEEDED FOR MODERATE PAIN, Disp: 30 tablet, Rfl: 2  •  traZODone (DESYREL) 50 MG tablet, Take 2 tablets by mouth Every Night., Disp: 60 tablet, Rfl: 5  •  VENTOLIN  (90 Base) MCG/ACT inhaler, INHALE TWO PUFFS BY MOUTH EVERY 6 HOURS, Disp: 1 inhaler, Rfl: 4  •  VOLTAREN 1 % gel gel, APPLY FOUR GRAMS TO AFFECTED AREA(S) FOUR TIMES A DAY, Disp: 100 g, Rfl: 5  •  methylPREDNISolone (MEDROL, YANIRA,) 4 MG tablet, Take as directed on package instructions., Disp: 21 each, Rfl: 0  •  ofloxacin (OCUFLOX) 0.3 % ophthalmic solution, Administer 1 drop into the left eye 4 (Four) Times a Day., Disp: 5 mL, Rfl: 0    ALLERGIES:     Allergies   Allergen Reactions   • Erythromycin        SOCIAL HISTORY:       Social History     Socioeconomic History   • Marital status:      Spouse name: Not on file   • Number of children: Not on file   • Years of education: College   • Highest education level: Not on file   Occupational History   • Occupation: [employer]     Employer: [employer]   Tobacco Use   • Smoking status: Former Smoker     Packs/day: 1.00     Years: 6.00     Pack years: 6.00     Types: Cigarettes   • Smokeless tobacco: Former User   • Tobacco comment: Smoked from age [age].   Substance and Sexual Activity   • Alcohol use: Yes     Comment: Occasional   • Drug use: No         FAMILY HISTORY:  Family History   Problem Relation Age of Onset   • Heart disease Mother    • Hypertension Mother    • Stomach cancer Father    • Ovarian cancer Maternal Aunt    • Stomach cancer Maternal Aunt        REVIEW OF SYSTEMS:  Review of Systems   Constitutional: Negative for activity change.   HENT: Negative for nosebleeds and trouble swallowing.    Respiratory: Negative for shortness of breath and wheezing.    Cardiovascular: Positive for chest pain. Negative for palpitations.   Gastrointestinal: Negative for constipation, diarrhea and nausea.   Genitourinary: Negative for dysuria and hematuria.   Musculoskeletal: Negative  "for arthralgias and myalgias.   Skin: Negative for rash and wound.   Neurological: Negative for seizures and syncope.   Hematological: Does not bruise/bleed easily.   Psychiatric/Behavioral: Negative for confusion.          Objective    Vitals:    04/22/19 1457   BP: 130/80   Pulse: 87   Resp: 16   Temp: 98.2 °F (36.8 °C)   SpO2: 98%   Weight: 79.5 kg (175 lb 3.2 oz)   Height: 158 cm (62.21\")   PainSc: 0-No pain     Current Status 4/22/2019   ECOG score 0      PHYSICAL EXAM:    CONSTITUTIONAL:  Vital signs reviewed.  No distress, looks comfortable.  EYES:  Conjunctiva and lids unremarkable.  PERRLA  EARS,NOSE,MOUTH,THROAT:  Ears and nose appear unremarkable.  Lips, teeth, gums appear unremarkable.  RESPIRATORY:  Normal respiratory effort.  Lungs clear to auscultation bilaterally.  BREAST: no masses by palpation or inspection   CARDIOVASCULAR:  Normal S1, S2.  No murmurs rubs or gallops.  No significant lower extremity edema.  GASTROINTESTINAL: Abdomen appears unremarkable.  Nontender.  No hepatomegaly.  No splenomegaly.  LYMPHATIC:  No cervical, supraclavicular, axillary lymphadenopathy.  SKIN:  Warm.  No rashes.  PSYCHIATRIC:  Normal judgment and insight.  Normal mood and affect.      RECENT LABS:        WBC   Date/Time Value Ref Range Status   04/22/2019 02:48 PM 7.43 3.40 - 10.80 10*3/mm3 Final   03/19/2019 10:01 AM 5.98 3.40 - 10.80 10*3/mm3 Final     Hemoglobin   Date/Time Value Ref Range Status   04/22/2019 02:48 PM 12.0 12.0 - 15.9 g/dL Final     Platelets   Date/Time Value Ref Range Status   04/22/2019 02:48  140 - 450 10*3/mm3 Final       Assessment/Plan     ASSESSMENT:  *T2N1M0 (stage IIB) invasive ductal carcinoma of the right breast. [place] score 9 of 9 (high grade), triple-negative, 3.3 cm tumor. Right mastectomy and TRAM flap reconstruction. Completed dose-dense Adriamycin/Cytoxan with dose-dense Taxol 07/11/2013. Completed radiation October 2013.   I reminded her Again today we would be " quite unusual for her cancer to recur this late.  There is a good probability she is cured of this cancer.    Although she is having pain in her chest with bending over, I doubt this is related to her history of cancer.  Statistically, she is likely cured of this cancer.  However, discomfort is been getting worse over the past for 5 months.  I do think it is worth checking a CT of the chest to look for malignancy as the cause of this discomfort.  She has already not responded to a trial of steroids.  We discussed first trying a trial of Aleve 2 tablets twice per day for 1 week.  If this does not help, she will maintain the plan for CT of the chest in a few weeks.  She knows to stay well-hydrated while using the Aleve.  She knows Aleve can contribute to peptic ulcer disease and renal dysfunction.    *. Nausea.  IBS.  Managed by both Dr. Gallegos and Dr. Vandana Zavaleta.  Denies nausea today.    *. Intermittent blurry vision, left eye more so than right eye.  She has seen her ophthalmologist for this.  She states nothing concerning was found.  She follows with Dr. Kiran Cabrera of neurology for this.  A prior MRI read as possible optic neuritis.  She states the follow-up MRI was fine.  She did not complain of this today.    *Weight loss.  Resolved.  165 pounds October 2014.  150 pounds April 2015.  145 pounds January 2016.  140 pounds February 2016.  136 pounds April 1, 2016.  132 pounds April 25, 2016.  Gained, up to 139 pounds, 5/9/16.  Weight is been stable since then.    *Weight gain.  Weight 157 pounds 11/6/18.  Weighs 175 pounds today, 4/22/19.  I suspect this weight gain is the reason she is feeling pressure on her chest.    * 2 liver lesions on 1/31/13 staging CAT scan.  Liver protocol CT subsequently read as benign liver lesions.  Liver lesions unchanged on CT 5/4/16.    * She has an annual  for breast cancer.      * Chronic sciatica and cervical pain.  Treatment through her PCP, Dr. Vandana Zavaleta.  As had  epidurals.  Denies pain today.    * Left preauricular minimally enlarged node.  Follows with Dr. Tin Ordoñez of ENT.  The node does not feel malignant my exam.  I reviewed records from Dr. Ordoñez, last record I have reviewed his 4/6/18.  Patient follows with Dr. Ordoñez.  I feel no abnormality on today's exam.    *Overweight.  I gave her a handout I made on tips for healthy ways to lose weight.    Plan  · Trial of Aleve 2 tablets twice per day times 1 week.  · Tentatively arranged MD 3-4 weeks.  A few days prior CT chest with contrast.  · If the Aleve takes care of the chest discomfort she can cancel the CT chest and appointment with me and change the appointment with me to MD RADHA BROWN 6 months  · I suspect gaining 20 pounds may have added pressure on her chest when she bends over.      Plan was:  PLAN:   · ARNOLDO GARCIA CMP 6 months  · Her port has been removed.   · Last mammogram, 5/17/18, BI-RADS 2.

## 2019-05-07 DIAGNOSIS — R11.0 NAUSEA: ICD-10-CM

## 2019-05-07 DIAGNOSIS — K21.00 GASTROESOPHAGEAL REFLUX DISEASE WITH ESOPHAGITIS: ICD-10-CM

## 2019-05-07 RX ORDER — GABAPENTIN 100 MG/1
CAPSULE ORAL
Qty: 90 CAPSULE | Refills: 2 | Status: SHIPPED | OUTPATIENT
Start: 2019-05-07 | End: 2019-07-23

## 2019-05-07 RX ORDER — DEXLANSOPRAZOLE 60 MG/1
CAPSULE, DELAYED RELEASE ORAL
Qty: 90 CAPSULE | Refills: 0 | Status: SHIPPED | OUTPATIENT
Start: 2019-05-07 | End: 2019-10-21 | Stop reason: SDUPTHER

## 2019-05-13 ENCOUNTER — TELEPHONE (OUTPATIENT)
Dept: ONCOLOGY | Facility: CLINIC | Age: 54
End: 2019-05-13

## 2019-05-13 ENCOUNTER — HOSPITAL ENCOUNTER (OUTPATIENT)
Dept: PET IMAGING | Facility: HOSPITAL | Age: 54
End: 2019-05-13

## 2019-05-14 ENCOUNTER — APPOINTMENT (OUTPATIENT)
Dept: PET IMAGING | Facility: HOSPITAL | Age: 54
End: 2019-05-14

## 2019-05-14 ENCOUNTER — HOSPITAL ENCOUNTER (OUTPATIENT)
Dept: CT IMAGING | Facility: HOSPITAL | Age: 54
Discharge: HOME | End: 2019-05-14
Admitting: INTERNAL MEDICINE

## 2019-05-14 DIAGNOSIS — C50.411 MALIGNANT NEOPLASM OF UPPER-OUTER QUADRANT OF RIGHT BREAST IN FEMALE, ESTROGEN RECEPTOR NEGATIVE (HCC): ICD-10-CM

## 2019-05-14 DIAGNOSIS — Z17.1 MALIGNANT NEOPLASM OF UPPER-OUTER QUADRANT OF RIGHT BREAST IN FEMALE, ESTROGEN RECEPTOR NEGATIVE (HCC): ICD-10-CM

## 2019-05-14 LAB — CREAT BLDA-MCNC: 0.6 MG/DL (ref 0.6–1.3)

## 2019-05-14 PROCEDURE — 71260 CT THORAX DX C+: CPT

## 2019-05-14 PROCEDURE — 82565 ASSAY OF CREATININE: CPT

## 2019-05-14 PROCEDURE — 25010000002 IOPAMIDOL 61 % SOLUTION: Performed by: INTERNAL MEDICINE

## 2019-05-14 RX ADMIN — IOPAMIDOL 75 ML: 612 INJECTION, SOLUTION INTRAVENOUS at 13:54

## 2019-05-20 NOTE — PROGRESS NOTES
Subjective .     REASONS FOR FOLLOWUP:  Breast cancer    HISTORY OF PRESENT ILLNESS:  The patient is a 53 y.o. year old female  who is here for follow-up with the above-mentioned history.    She is here to review CT scan results and follow-up on the pressure sensation that she feels in her chest when bending over.  The pressure sensation did improve with NSAIDs but did not resolve.  CT 6 chest negative for malignant etiology of the discomfort.  She has been trying to lose weight, watching what she eats and exercising more.  She is lost 5 pounds over the past month.    Past Medical History:   Diagnosis Date   • Anemia    • Depression    • H/O transfusion of packed red blood cells    • Headache    • History of low back pain    • Malignant neoplasm of breast (CMS/HCC) 2013    Right, T2N1M0, Stage IIB       HEMATOLOGIC/ONCOLOGIC HISTORY:  (History from previous dates can be found in the separate document.)    MEDICATIONS    Current Outpatient Medications:   •  amLODIPine (NORVASC) 5 MG tablet, TAKE ONE TABLET BY MOUTH DAILY, Disp: 90 tablet, Rfl: 1  •  cyclobenzaprine (FLEXERIL) 10 MG tablet, Take 1 tablet by mouth 2 (Two) Times a Day As Needed for Muscle Spasms., Disp: 30 tablet, Rfl: 0  •  DEXILANT 60 MG capsule, TAKE ONE CAPSULE BY MOUTH DAILY, Disp: 90 capsule, Rfl: 0  •  fluticasone (FLONASE) 50 MCG/ACT nasal spray, PLACE TWO SPRAYS IN EACH NOSTRIL ONCE DAILY, Disp: 16 each, Rfl: 3  •  gabapentin (NEURONTIN) 100 MG capsule, Take 1 capsule by mouth Every Night., Disp: 90 capsule, Rfl: 3  •  gabapentin (NEURONTIN) 100 MG capsule, TAKE ONE CAPSULE BY MOUTH THREE TIMES A DAY, Disp: 90 capsule, Rfl: 2  •  LINZESS 145 MCG capsule, TAKE ONE CAPSULE BY MOUTH DAILY, Disp: 90 capsule, Rfl: 1  •  meloxicam (MOBIC) 15 MG tablet, Take 1 tablet by mouth Daily., Disp: 30 tablet, Rfl: 1  •  methylPREDNISolone (MEDROL, YANIRA,) 4 MG tablet, Take as directed on package instructions., Disp: 21 each, Rfl: 0  •  ofloxacin (OCUFLOX) 0.3  % ophthalmic solution, Administer 1 drop into the left eye 4 (Four) Times a Day., Disp: 5 mL, Rfl: 0  •  PARoxetine (PAXIL) 30 MG tablet, Take 1 tablet by mouth Every Morning., Disp: 90 tablet, Rfl: 1  •  rosuvastatin (CRESTOR) 10 MG tablet, Take 1 tablet by mouth Daily., Disp: 30 tablet, Rfl: 5  •  traMADol (ULTRAM) 50 MG tablet, TAKE ONE TABLET BY MOUTH EVERY 6 HOURS AS NEEDED FOR MODERATE PAIN, Disp: 30 tablet, Rfl: 2  •  traZODone (DESYREL) 50 MG tablet, Take 2 tablets by mouth Every Night., Disp: 60 tablet, Rfl: 5  •  VENTOLIN  (90 Base) MCG/ACT inhaler, INHALE TWO PUFFS BY MOUTH EVERY 6 HOURS, Disp: 1 inhaler, Rfl: 4  •  VOLTAREN 1 % gel gel, APPLY FOUR GRAMS TO AFFECTED AREA(S) FOUR TIMES A DAY, Disp: 100 g, Rfl: 5    ALLERGIES:     Allergies   Allergen Reactions   • Erythromycin        SOCIAL HISTORY:       Social History     Socioeconomic History   • Marital status:      Spouse name: Not on file   • Number of children: Not on file   • Years of education: College   • Highest education level: Not on file   Occupational History   • Occupation: [employer]     Employer: [employer]   Tobacco Use   • Smoking status: Former Smoker     Packs/day: 1.00     Years: 6.00     Pack years: 6.00     Types: Cigarettes   • Smokeless tobacco: Former User   • Tobacco comment: Smoked from age [age].   Substance and Sexual Activity   • Alcohol use: Yes     Comment: Occasional   • Drug use: No         FAMILY HISTORY:  Family History   Problem Relation Age of Onset   • Heart disease Mother    • Hypertension Mother    • Stomach cancer Father    • Ovarian cancer Maternal Aunt    • Stomach cancer Maternal Aunt        REVIEW OF SYSTEMS:  Review of Systems   Constitutional: Negative for activity change.   HENT: Negative for nosebleeds and trouble swallowing.    Respiratory: Negative for shortness of breath and wheezing.    Cardiovascular: Positive for chest pain. Negative for palpitations.   Gastrointestinal:  "Negative for constipation, diarrhea and nausea.   Genitourinary: Negative for dysuria and hematuria.   Musculoskeletal: Negative for arthralgias and myalgias.   Skin: Negative for rash and wound.   Neurological: Negative for seizures and syncope.   Hematological: Does not bruise/bleed easily.   Psychiatric/Behavioral: Negative for confusion.          Objective    Vitals:    05/21/19 1450   BP: 130/69   Pulse: 100   Resp: 18   Temp: 98.1 °F (36.7 °C)   SpO2: 100%   Weight: 77.3 kg (170 lb 6.4 oz)   Height: 158 cm (62.21\")   PainSc: 0-No pain     Current Status 5/21/2019   ECOG score 0      PHYSICAL EXAM:    CONSTITUTIONAL:  Vital signs reviewed.  No distress, looks comfortable.  EYES:  Conjunctiva and lids unremarkable.  PERRLA  EARS,NOSE,MOUTH,THROAT:  Ears and nose appear unremarkable.  Lips, teeth, gums appear unremarkable.  RESPIRATORY:  Normal respiratory effort.  Lungs clear to auscultation bilaterally.  CARDIOVASCULAR:  Normal S1, S2.  No murmurs rubs or gallops.  No significant lower extremity edema.  GASTROINTESTINAL: Abdomen appears unremarkable.  Nontender.  No hepatomegaly.  No splenomegaly.  LYMPHATIC:  No cervical, supraclavicular, axillary lymphadenopathy.  SKIN:  Warm.  No rashes.  PSYCHIATRIC:  Normal judgment and insight.  Normal mood and affect.      RECENT LABS:        WBC   Date/Time Value Ref Range Status   05/21/2019 02:47 PM 7.06 3.40 - 10.80 10*3/mm3 Final   03/19/2019 10:01 AM 5.98 3.40 - 10.80 10*3/mm3 Final     Hemoglobin   Date/Time Value Ref Range Status   05/21/2019 02:47 PM 13.1 12.0 - 15.9 g/dL Final     Platelets   Date/Time Value Ref Range Status   05/21/2019 02:47  140 - 450 10*3/mm3 Final       Assessment/Plan     ASSESSMENT:  *T2N1M0 (stage IIB) invasive ductal carcinoma of the right breast. [place] score 9 of 9 (high grade), triple-negative, 3.3 cm tumor. Right mastectomy and TRAM flap reconstruction. Completed dose-dense Adriamycin/Cytoxan with dose-dense Taxol " 07/11/2013. Completed radiation October 2013.   I reminded her Again today we would be quite unusual for her cancer to recur this late.  There is a good probability she is cured of this cancer.    Although she is having pain in her chest with bending over, I doubt this is related to her history of cancer.  Statistically, she is likely cured of this cancer.  However, discomfort is been getting worse over the past for 5 months.  I do think it is worth checking a CT of the chest to look for malignancy as the cause of this discomfort.  She has already not responded to a trial of steroids.  We discussed first trying a trial of Aleve 2 tablets twice per day for 1 week.  If this does not help, she will maintain the plan for CT of the chest in a few weeks.  She knows to stay well-hydrated while using the Aleve.  She knows Aleve can contribute to peptic ulcer disease and renal dysfunction.  · CT chest 5/14/2019 (done due to chest discomfort with bending over): No evidence of malignancy.  · CT images personally viewed by me    *Chest discomfort upon bending over since around December 2018 or so.  I suspect this is related to her weight gain.  No signs of malignancy as the reason for the discomfort.    *Nausea.  IBS.  Managed by both Dr. Gallegos and Dr. Vandana Zavaleta.  Denies nausea today.    *. Intermittent blurry vision, left eye more so than right eye.  She has seen her ophthalmologist for this.  She states nothing concerning was found.  She follows with Dr. Kiran Cabrera of neurology for this.  A prior MRI read as possible optic neuritis.  She states the follow-up MRI was fine.  She did not complain of this today.    *Weight loss.  Resolved.  165 pounds October 2014.  150 pounds April 2015.  145 pounds January 2016.  140 pounds February 2016.  136 pounds April 1, 2016.  132 pounds April 25, 2016.  Gained, up to 139 pounds, 5/9/16.  Weight is been stable since then.    *Weight gain.  Weight 157 pounds 11/6/18.  Weighs 175 pounds  today, 4/22/19.  I suspect this weight gain is the reason she is feeling pressure on her chest.  I explained obesity is a risk factor for malignancies as well as other health issues.  I recommended weight loss and we reviewed some tips for this today.  She is trying and I congratulated her on her 5 pound success over the past month.    * 2 liver lesions on 1/31/13 staging CAT scan.  Liver protocol CT subsequently read as benign liver lesions.  Liver lesions unchanged on CT 5/4/16.    * She has an annual  for breast cancer.      * Chronic sciatica and cervical pain.  Treatment through her PCP, Dr. Vandana Zavaleta.  As had epidurals.  Denies pain today.    * Left preauricular minimally enlarged node.  Follows with Dr. Tin Ordoñez of ENT.  The node does not feel malignant my exam.  I reviewed records from Dr. Ordoñez, last record I have reviewed his 4/6/18.  Patient follows with Dr. Ordoñez.  I feel no abnormality on today's exam.      Plan  · MD CBC 1 year (because she is over 5 years out from triple negative breast cancer, this is likely cured)  · Her port has been removed.   · Last mammogram, 3/28/2019, BI-RADS 1.

## 2019-05-21 ENCOUNTER — APPOINTMENT (OUTPATIENT)
Dept: OTHER | Facility: HOSPITAL | Age: 54
End: 2019-05-21

## 2019-05-21 ENCOUNTER — OFFICE VISIT (OUTPATIENT)
Dept: ONCOLOGY | Facility: CLINIC | Age: 54
End: 2019-05-21

## 2019-05-21 VITALS
SYSTOLIC BLOOD PRESSURE: 130 MMHG | WEIGHT: 170.4 LBS | RESPIRATION RATE: 18 BRPM | BODY MASS INDEX: 31.36 KG/M2 | HEART RATE: 100 BPM | TEMPERATURE: 98.1 F | OXYGEN SATURATION: 100 % | DIASTOLIC BLOOD PRESSURE: 69 MMHG | HEIGHT: 62 IN

## 2019-05-21 DIAGNOSIS — E55.9 VITAMIN D DEFICIENCY: Primary | ICD-10-CM

## 2019-05-21 DIAGNOSIS — C50.411 MALIGNANT NEOPLASM OF UPPER-OUTER QUADRANT OF RIGHT BREAST IN FEMALE, ESTROGEN RECEPTOR NEGATIVE (HCC): Primary | ICD-10-CM

## 2019-05-21 DIAGNOSIS — Z17.1 MALIGNANT NEOPLASM OF UPPER-OUTER QUADRANT OF RIGHT BREAST IN FEMALE, ESTROGEN RECEPTOR NEGATIVE (HCC): Primary | ICD-10-CM

## 2019-05-21 LAB
BASOPHILS # BLD AUTO: 0.05 10*3/MM3 (ref 0–0.2)
BASOPHILS NFR BLD AUTO: 0.7 % (ref 0–1.5)
DEPRECATED RDW RBC AUTO: 42.6 FL (ref 37–54)
EOSINOPHIL # BLD AUTO: 0.08 10*3/MM3 (ref 0–0.4)
EOSINOPHIL NFR BLD AUTO: 1.1 % (ref 0.3–6.2)
ERYTHROCYTE [DISTWIDTH] IN BLOOD BY AUTOMATED COUNT: 14.4 % (ref 12.3–15.4)
HCT VFR BLD AUTO: 38.8 % (ref 34–46.6)
HGB BLD-MCNC: 13.1 G/DL (ref 12–15.9)
IMM GRANULOCYTES # BLD AUTO: 0.03 10*3/MM3 (ref 0–0.05)
IMM GRANULOCYTES NFR BLD AUTO: 0.4 % (ref 0–0.5)
LYMPHOCYTES # BLD AUTO: 3.01 10*3/MM3 (ref 0.7–3.1)
LYMPHOCYTES NFR BLD AUTO: 42.6 % (ref 19.6–45.3)
MCH RBC QN AUTO: 27.8 PG (ref 26.6–33)
MCHC RBC AUTO-ENTMCNC: 33.8 G/DL (ref 31.5–35.7)
MCV RBC AUTO: 82.2 FL (ref 79–97)
MONOCYTES # BLD AUTO: 0.69 10*3/MM3 (ref 0.1–0.9)
MONOCYTES NFR BLD AUTO: 9.8 % (ref 5–12)
NEUTROPHILS # BLD AUTO: 3.2 10*3/MM3 (ref 1.7–7)
NEUTROPHILS NFR BLD AUTO: 45.4 % (ref 42.7–76)
NRBC BLD AUTO-RTO: 0 /100 WBC (ref 0–0.2)
PLATELET # BLD AUTO: 298 10*3/MM3 (ref 140–450)
PMV BLD AUTO: 9.4 FL (ref 6–12)
RBC # BLD AUTO: 4.72 10*6/MM3 (ref 3.77–5.28)
WBC NRBC COR # BLD: 7.06 10*3/MM3 (ref 3.4–10.8)

## 2019-05-21 PROCEDURE — 36415 COLL VENOUS BLD VENIPUNCTURE: CPT

## 2019-05-21 PROCEDURE — 85025 COMPLETE CBC W/AUTO DIFF WBC: CPT | Performed by: INTERNAL MEDICINE

## 2019-05-21 PROCEDURE — 99215 OFFICE O/P EST HI 40 MIN: CPT | Performed by: INTERNAL MEDICINE

## 2019-06-06 RX ORDER — PAROXETINE 30 MG/1
30 TABLET, FILM COATED ORAL EVERY MORNING
Qty: 90 TABLET | Refills: 1 | Status: SHIPPED | OUTPATIENT
Start: 2019-06-06 | End: 2019-12-19

## 2019-06-06 RX ORDER — TRAZODONE HYDROCHLORIDE 50 MG/1
TABLET ORAL
Qty: 60 TABLET | Refills: 4 | Status: SHIPPED | OUTPATIENT
Start: 2019-06-06 | End: 2019-12-19

## 2019-07-23 ENCOUNTER — OFFICE VISIT (OUTPATIENT)
Dept: INTERNAL MEDICINE | Facility: CLINIC | Age: 54
End: 2019-07-23

## 2019-07-23 VITALS
BODY MASS INDEX: 30.53 KG/M2 | HEART RATE: 78 BPM | OXYGEN SATURATION: 98 % | DIASTOLIC BLOOD PRESSURE: 80 MMHG | SYSTOLIC BLOOD PRESSURE: 126 MMHG | WEIGHT: 168 LBS

## 2019-07-23 DIAGNOSIS — M54.2 NECK PAIN: ICD-10-CM

## 2019-07-23 DIAGNOSIS — M47.816 LUMBAR FACET ARTHROPATHY: ICD-10-CM

## 2019-07-23 DIAGNOSIS — E78.5 HYPERLIPIDEMIA, UNSPECIFIED HYPERLIPIDEMIA TYPE: ICD-10-CM

## 2019-07-23 DIAGNOSIS — I10 ESSENTIAL HYPERTENSION: Primary | ICD-10-CM

## 2019-07-23 DIAGNOSIS — M51.36 BULGE OF LUMBAR DISC WITHOUT MYELOPATHY: ICD-10-CM

## 2019-07-23 LAB
ALBUMIN SERPL-MCNC: 4.9 G/DL (ref 3.5–5.2)
ALBUMIN/GLOB SERPL: 2.6 G/DL
ALP SERPL-CCNC: 114 U/L (ref 39–117)
ALT SERPL-CCNC: 23 U/L (ref 1–33)
AST SERPL-CCNC: 24 U/L (ref 1–32)
BILIRUB SERPL-MCNC: 0.4 MG/DL (ref 0.2–1.2)
BUN SERPL-MCNC: 8 MG/DL (ref 6–20)
BUN/CREAT SERPL: 12.9 (ref 7–25)
CALCIUM SERPL-MCNC: 8.9 MG/DL (ref 8.6–10.5)
CHLORIDE SERPL-SCNC: 101 MMOL/L (ref 98–107)
CHOLEST SERPL-MCNC: 122 MG/DL (ref 0–200)
CO2 SERPL-SCNC: 30.6 MMOL/L (ref 22–29)
CREAT SERPL-MCNC: 0.62 MG/DL (ref 0.57–1)
GLOBULIN SER CALC-MCNC: 1.9 GM/DL
GLUCOSE SERPL-MCNC: 86 MG/DL (ref 65–99)
HDLC SERPL-MCNC: 60 MG/DL (ref 40–60)
LDLC SERPL CALC-MCNC: 38 MG/DL (ref 0–100)
LDLC/HDLC SERPL: 0.63 {RATIO}
POTASSIUM SERPL-SCNC: 4.6 MMOL/L (ref 3.5–5.2)
PROT SERPL-MCNC: 6.8 G/DL (ref 6–8.5)
SODIUM SERPL-SCNC: 142 MMOL/L (ref 136–145)
TRIGL SERPL-MCNC: 120 MG/DL (ref 0–150)
VLDLC SERPL CALC-MCNC: 24 MG/DL (ref 5–40)

## 2019-07-23 PROCEDURE — 90471 IMMUNIZATION ADMIN: CPT | Performed by: INTERNAL MEDICINE

## 2019-07-23 PROCEDURE — 90632 HEPA VACCINE ADULT IM: CPT | Performed by: INTERNAL MEDICINE

## 2019-07-23 PROCEDURE — 99214 OFFICE O/P EST MOD 30 MIN: CPT | Performed by: INTERNAL MEDICINE

## 2019-07-23 RX ORDER — TRAMADOL HYDROCHLORIDE 50 MG/1
50 TABLET ORAL EVERY 6 HOURS PRN
Qty: 30 TABLET | Refills: 5 | Status: SHIPPED | OUTPATIENT
Start: 2019-07-23 | End: 2019-10-18 | Stop reason: SDUPTHER

## 2019-07-23 RX ORDER — CYCLOBENZAPRINE HCL 10 MG
10 TABLET ORAL 2 TIMES DAILY PRN
Qty: 30 TABLET | Refills: 5 | Status: SHIPPED | OUTPATIENT
Start: 2019-07-23 | End: 2019-10-18 | Stop reason: SDUPTHER

## 2019-07-23 NOTE — PROGRESS NOTES
Chief Complaint   Patient presents with   • Hypertension   • Back Pain   • Neck Pain       History of Present Illness   Meenakshi Mae is a 54 y.o. female presents for follow up evaluation. In general she is doing fairly well today. She does have some continued low back and neck pain. Taking tramadol v flexeril about 5 days. She has tried epidural injections as well as physical therapy  Patient with hypertension. She is normotensive with current medications.   Has dyslipidemia. Eating a lot of baked goods and more red meat. Her spouse prefers more heavy foods and keeps higher carbs around than she normally had. Some increased fitness w/ dogs.       The following portions of the patient's history were reviewed and updated as appropriate: allergies, current medications, past family history, past medical history, past social history, past surgical history and problem list.  Current Outpatient Medications on File Prior to Visit   Medication Sig Dispense Refill   • amLODIPine (NORVASC) 5 MG tablet TAKE ONE TABLET BY MOUTH DAILY 90 tablet 1   • DEXILANT 60 MG capsule TAKE ONE CAPSULE BY MOUTH DAILY 90 capsule 0   • fluticasone (FLONASE) 50 MCG/ACT nasal spray PLACE TWO SPRAYS IN EACH NOSTRIL ONCE DAILY 16 each 3   • LINZESS 145 MCG capsule TAKE ONE CAPSULE BY MOUTH DAILY 90 capsule 1   • meloxicam (MOBIC) 15 MG tablet Take 1 tablet by mouth Daily. 30 tablet 1   • PARoxetine (PAXIL) 30 MG tablet Take 1 tablet by mouth Every Morning. 90 tablet 1   • rosuvastatin (CRESTOR) 10 MG tablet Take 1 tablet by mouth Daily. 30 tablet 5   • traZODone (DESYREL) 50 MG tablet TAKE TWO TABLETS BY MOUTH EVERY NIGHT 60 tablet 4   • VENTOLIN  (90 Base) MCG/ACT inhaler INHALE TWO PUFFS BY MOUTH EVERY 6 HOURS 1 inhaler 4   • VOLTAREN 1 % gel gel APPLY FOUR GRAMS TO AFFECTED AREA(S) FOUR TIMES A  g 5   • [DISCONTINUED] cyclobenzaprine (FLEXERIL) 10 MG tablet Take 1 tablet by mouth 2 (Two) Times a Day As Needed for Muscle Spasms.  30 tablet 0   • [DISCONTINUED] traMADol (ULTRAM) 50 MG tablet TAKE ONE TABLET BY MOUTH EVERY 6 HOURS AS NEEDED FOR MODERATE PAIN 30 tablet 2   • [DISCONTINUED] gabapentin (NEURONTIN) 100 MG capsule Take 1 capsule by mouth Every Night. 90 capsule 3   • [DISCONTINUED] gabapentin (NEURONTIN) 100 MG capsule TAKE ONE CAPSULE BY MOUTH THREE TIMES A DAY 90 capsule 2   • [DISCONTINUED] methylPREDNISolone (MEDROL, YANIRA,) 4 MG tablet Take as directed on package instructions. 21 each 0   • [DISCONTINUED] ofloxacin (OCUFLOX) 0.3 % ophthalmic solution Administer 1 drop into the left eye 4 (Four) Times a Day. 5 mL 0     No current facility-administered medications on file prior to visit.      Review of Systems   Constitutional: Negative.    HENT: Negative.    Eyes: Negative.    Respiratory: Negative.    Cardiovascular: Negative.    Gastrointestinal: Negative.    Endocrine: Negative.    Genitourinary: Negative.    Musculoskeletal: Negative.    Skin: Negative.    Allergic/Immunologic: Negative.    Neurological: Negative.    Hematological: Negative.    Psychiatric/Behavioral: Negative.        Objective   Physical Exam   Constitutional: She is oriented to person, place, and time. She appears well-developed and well-nourished.   HENT:   Head: Normocephalic and atraumatic.   Right Ear: External ear normal.   Left Ear: External ear normal.   Nose: Nose normal.   Mouth/Throat: Oropharynx is clear and moist.   Eyes: Conjunctivae and EOM are normal. Pupils are equal, round, and reactive to light.   Neck: Normal range of motion. Neck supple.   Cardiovascular: Normal rate, regular rhythm, normal heart sounds and intact distal pulses.   Pulmonary/Chest: Effort normal and breath sounds normal. No respiratory distress.   Abdominal: Soft. Bowel sounds are normal.   Musculoskeletal: Normal range of motion.   Neurological: She is alert and oriented to person, place, and time.   Skin: Skin is warm and dry.   Psychiatric: She has a normal mood and  affect. Her behavior is normal. Judgment and thought content normal.   Nursing note and vitals reviewed.       /80   Pulse 78   Wt 76.2 kg (168 lb)   SpO2 98%   BMI 30.53 kg/m²     Assessment/Plan   Diagnoses and all orders for this visit:    Essential hypertension    Bulge of lumbar disc without myelopathy    Lumbar facet arthropathy    Neck pain    Hyperlipidemia, unspecified hyperlipidemia type  -     Comprehensive Metabolic Panel  -     Lipid Panel With LDL / HDL Ratio    Other orders  -     Hepatitis A Vaccine Adult IM  -     traMADol (ULTRAM) 50 MG tablet; Take 1 tablet by mouth Every 6 (Six) Hours As Needed for Moderate Pain .  -     cyclobenzaprine (FLEXERIL) 10 MG tablet; Take 1 tablet by mouth 2 (Two) Times a Day As Needed for Muscle Spasms.    patient with hypertension. This is at goal level at this time. She has dyslipidemia. Will repeat labs now that she is taking crestor. Prn flexeril v tramadol for back and neck pain. She will make dietary modifications and increase fitness. Routine follow up.

## 2019-09-09 ENCOUNTER — TELEPHONE (OUTPATIENT)
Dept: INTERNAL MEDICINE | Facility: CLINIC | Age: 54
End: 2019-09-09

## 2019-09-09 NOTE — TELEPHONE ENCOUNTER
Pt LM that she needs a letter stating that she is a breast cancer survivor and she can get a free mattress.

## 2019-09-11 ENCOUNTER — TELEPHONE (OUTPATIENT)
Dept: ONCOLOGY | Facility: CLINIC | Age: 54
End: 2019-09-11

## 2019-09-11 NOTE — TELEPHONE ENCOUNTER
Pt called triage wanting a letter stating she is a breast cancer survivor. I called pt today to get more information on what the letter was for and what it needed to say, but pt stated her PCP actually wrote her a letter instead after she called.

## 2019-10-02 RX ORDER — ALBUTEROL SULFATE 90 UG/1
AEROSOL, METERED RESPIRATORY (INHALATION)
Qty: 18 G | Refills: 4 | Status: SHIPPED | OUTPATIENT
Start: 2019-10-02 | End: 2020-01-30 | Stop reason: SDUPTHER

## 2019-10-18 ENCOUNTER — OFFICE VISIT (OUTPATIENT)
Dept: INTERNAL MEDICINE | Facility: CLINIC | Age: 54
End: 2019-10-18

## 2019-10-18 VITALS
SYSTOLIC BLOOD PRESSURE: 110 MMHG | HEART RATE: 89 BPM | BODY MASS INDEX: 25.16 KG/M2 | HEIGHT: 68 IN | WEIGHT: 166 LBS | DIASTOLIC BLOOD PRESSURE: 78 MMHG | OXYGEN SATURATION: 97 %

## 2019-10-18 DIAGNOSIS — M54.31 RIGHT SCIATIC NERVE PAIN: ICD-10-CM

## 2019-10-18 DIAGNOSIS — M54.6 ACUTE MIDLINE THORACIC BACK PAIN: Primary | ICD-10-CM

## 2019-10-18 PROCEDURE — G0008 ADMIN INFLUENZA VIRUS VAC: HCPCS | Performed by: INTERNAL MEDICINE

## 2019-10-18 PROCEDURE — 72100 X-RAY EXAM L-S SPINE 2/3 VWS: CPT | Performed by: INTERNAL MEDICINE

## 2019-10-18 PROCEDURE — 99214 OFFICE O/P EST MOD 30 MIN: CPT | Performed by: INTERNAL MEDICINE

## 2019-10-18 PROCEDURE — 90674 CCIIV4 VAC NO PRSV 0.5 ML IM: CPT | Performed by: INTERNAL MEDICINE

## 2019-10-18 PROCEDURE — 72070 X-RAY EXAM THORAC SPINE 2VWS: CPT | Performed by: INTERNAL MEDICINE

## 2019-10-18 RX ORDER — CYCLOBENZAPRINE HCL 10 MG
10 TABLET ORAL 2 TIMES DAILY PRN
Qty: 30 TABLET | Refills: 5 | Status: SHIPPED | OUTPATIENT
Start: 2019-10-18 | End: 2020-01-30 | Stop reason: SDUPTHER

## 2019-10-18 RX ORDER — TRAMADOL HYDROCHLORIDE 50 MG/1
50 TABLET ORAL EVERY 6 HOURS PRN
Qty: 30 TABLET | Refills: 5 | Status: SHIPPED | OUTPATIENT
Start: 2019-10-18 | End: 2020-05-18

## 2019-10-18 NOTE — PROGRESS NOTES
Chief Complaint   Patient presents with   • Back Pain     2 weeks   • Leg Pain     2 weeks       History of Present Illness   Meenakshi Mae is a 54 y.o. female presents for acute needs patient is having upper thoracic spinal pain that has been present for about 2 weeks. This stays in one location. She has intermittent lower extremity pain that is lancinating in nature. It will be present for a couple of hours then gradually heath. May be present a day or two at a time.       The following portions of the patient's history were reviewed and updated as appropriate: allergies, current medications, past family history, past medical history, past social history, past surgical history and problem list.  Current Outpatient Medications on File Prior to Visit   Medication Sig Dispense Refill   • amLODIPine (NORVASC) 5 MG tablet TAKE ONE TABLET BY MOUTH DAILY 90 tablet 1   • cyclobenzaprine (FLEXERIL) 10 MG tablet Take 1 tablet by mouth 2 (Two) Times a Day As Needed for Muscle Spasms. 30 tablet 5   • DEXILANT 60 MG capsule TAKE ONE CAPSULE BY MOUTH DAILY 90 capsule 0   • diclofenac (VOLTAREN) 1 % gel gel APPLY 4 GRAMS TO AFFECTED AREA(S) FOUR TIMES A DAY 1 tube 4   • fluticasone (FLONASE) 50 MCG/ACT nasal spray PLACE TWO SPRAYS IN EACH NOSTRIL ONCE DAILY 16 each 3   • LINZESS 145 MCG capsule TAKE ONE CAPSULE BY MOUTH DAILY 90 capsule 1   • meloxicam (MOBIC) 15 MG tablet Take 1 tablet by mouth Daily. 30 tablet 1   • PARoxetine (PAXIL) 30 MG tablet Take 1 tablet by mouth Every Morning. 90 tablet 1   • rosuvastatin (CRESTOR) 10 MG tablet Take 1 tablet by mouth Daily. 30 tablet 5   • traMADol (ULTRAM) 50 MG tablet Take 1 tablet by mouth Every 6 (Six) Hours As Needed for Moderate Pain . 30 tablet 5   • traZODone (DESYREL) 50 MG tablet TAKE TWO TABLETS BY MOUTH EVERY NIGHT 60 tablet 4   • VENTOLIN  (90 Base) MCG/ACT inhaler INHALE TWO PUFFS BY MOUTH EVERY 6 HOURS 1 inhaler 4   • VENTOLIN  (90 Base) MCG/ACT inhaler  "INHALE TWO PUFFS BY MOUTH EVERY 6 HOURS 18 g 4     No current facility-administered medications on file prior to visit.      Review of Systems   Constitutional: Negative.    HENT: Negative.    Eyes: Negative.    Respiratory: Negative.    Cardiovascular: Negative.    Gastrointestinal: Negative.    Endocrine: Negative.    Genitourinary: Negative.    Musculoskeletal: Positive for back pain.   Skin: Negative.    Allergic/Immunologic: Negative.    Neurological:        Radicular pain right leg   Hematological: Negative.    Psychiatric/Behavioral: Negative.        Objective   Physical Exam   Constitutional: She is oriented to person, place, and time. She appears well-developed and well-nourished.   HENT:   Head: Normocephalic and atraumatic.   Right Ear: External ear normal.   Left Ear: External ear normal.   Nose: Nose normal.   Mouth/Throat: Oropharynx is clear and moist.   Eyes: Conjunctivae and EOM are normal. Pupils are equal, round, and reactive to light.   Neck: Normal range of motion. Neck supple.   Cardiovascular: Normal rate, regular rhythm and normal heart sounds.   Pulmonary/Chest: Effort normal and breath sounds normal. No respiratory distress.   Abdominal: Soft. Bowel sounds are normal.   Musculoskeletal: Normal range of motion.   Back pain     Neurological: She is alert and oriented to person, place, and time.   Skin: Skin is warm and dry.   Psychiatric: She has a normal mood and affect. Her behavior is normal. Judgment and thought content normal.   Nursing note and vitals reviewed.   tspine. No acute findings. No prior.  lspine mild ddd at L4/L5. No prior.     /78   Pulse 89   Ht 172.7 cm (68\")   Wt 75.3 kg (166 lb)   SpO2 97%   BMI 25.24 kg/m²     Assessment/Plan   Diagnoses and all orders for this visit:    Acute midline thoracic back pain  -     XR Spine Lumbar 2 or 3 View (In Office)  -     XR Spine Thoracic 2 View    Right sciatic nerve pain    Other orders  -     Flucelvax Quad=>4Years " ([id number])    patient w/ midthoracic and low back pain.s he will continue current meds for this. She may take tramadol prn in the evening. Leg pain actually seems to be sciatic in nature and suspect lifestyle/ activity modifications will be beneficial for both areas of pain. Xray imaging prelim nad. She will start physical therapy. F/u if worsens or no improvement. Increase yoga. F/u if worsens or no benefit.

## 2019-10-21 DIAGNOSIS — K21.00 GASTROESOPHAGEAL REFLUX DISEASE WITH ESOPHAGITIS: ICD-10-CM

## 2019-10-21 DIAGNOSIS — R11.0 NAUSEA: ICD-10-CM

## 2019-10-21 RX ORDER — DEXLANSOPRAZOLE 60 MG/1
CAPSULE, DELAYED RELEASE ORAL
Qty: 90 CAPSULE | Refills: 0 | Status: SHIPPED | OUTPATIENT
Start: 2019-10-21 | End: 2019-12-19

## 2019-10-25 ENCOUNTER — DOCUMENTATION (OUTPATIENT)
Dept: INTERNAL MEDICINE | Facility: CLINIC | Age: 54
End: 2019-10-25

## 2019-10-25 NOTE — PROGRESS NOTES
Xray w/ degen changes. Advised patient that is pain is persistent or worsens we should get an MRI.  JAN

## 2019-12-19 DIAGNOSIS — R11.0 NAUSEA: ICD-10-CM

## 2019-12-19 DIAGNOSIS — K21.00 GASTROESOPHAGEAL REFLUX DISEASE WITH ESOPHAGITIS: ICD-10-CM

## 2019-12-19 RX ORDER — ROSUVASTATIN CALCIUM 10 MG/1
TABLET, COATED ORAL
Qty: 90 TABLET | Refills: 4 | Status: SHIPPED | OUTPATIENT
Start: 2019-12-19 | End: 2021-01-04

## 2019-12-19 RX ORDER — TRAZODONE HYDROCHLORIDE 50 MG/1
TABLET ORAL
Qty: 60 TABLET | Refills: 3 | Status: SHIPPED | OUTPATIENT
Start: 2019-12-19 | End: 2020-01-30 | Stop reason: SDUPTHER

## 2019-12-19 RX ORDER — AMLODIPINE BESYLATE 5 MG/1
TABLET ORAL
Qty: 90 TABLET | Refills: 0 | Status: SHIPPED | OUTPATIENT
Start: 2019-12-19 | End: 2020-05-14

## 2019-12-19 RX ORDER — DEXLANSOPRAZOLE 60 MG/1
CAPSULE, DELAYED RELEASE ORAL
Qty: 90 CAPSULE | Refills: 0 | Status: SHIPPED | OUTPATIENT
Start: 2019-12-19 | End: 2020-07-29

## 2019-12-19 RX ORDER — PAROXETINE 30 MG/1
TABLET, FILM COATED ORAL
Qty: 90 TABLET | Refills: 0 | Status: SHIPPED | OUTPATIENT
Start: 2019-12-19 | End: 2020-04-24

## 2020-01-30 RX ORDER — ALBUTEROL SULFATE 90 UG/1
2 AEROSOL, METERED RESPIRATORY (INHALATION) EVERY 4 HOURS PRN
Qty: 18 G | Refills: 4 | Status: SHIPPED | OUTPATIENT
Start: 2020-01-30 | End: 2021-03-09

## 2020-01-30 RX ORDER — CYCLOBENZAPRINE HCL 10 MG
10 TABLET ORAL 2 TIMES DAILY PRN
Qty: 30 TABLET | Refills: 5 | Status: SHIPPED | OUTPATIENT
Start: 2020-01-30 | End: 2020-03-09

## 2020-01-30 RX ORDER — TRAZODONE HYDROCHLORIDE 50 MG/1
100 TABLET ORAL NIGHTLY
Qty: 60 TABLET | Refills: 3 | Status: SHIPPED | OUTPATIENT
Start: 2020-01-30 | End: 2020-04-23

## 2020-03-09 RX ORDER — CYCLOBENZAPRINE HCL 10 MG
TABLET ORAL
Qty: 30 TABLET | Refills: 1 | Status: SHIPPED | OUTPATIENT
Start: 2020-03-09 | End: 2020-04-13

## 2020-04-13 RX ORDER — CYCLOBENZAPRINE HCL 10 MG
TABLET ORAL
Qty: 30 TABLET | Refills: 1 | Status: SHIPPED | OUTPATIENT
Start: 2020-04-13 | End: 2020-05-22

## 2020-04-22 ENCOUNTER — TELEPHONE (OUTPATIENT)
Dept: INTERNAL MEDICINE | Facility: CLINIC | Age: 55
End: 2020-04-22

## 2020-04-22 NOTE — TELEPHONE ENCOUNTER
Called patient and left voicemail to set up video visit with Dr Zavaleta to discuss medication change.

## 2020-04-22 NOTE — TELEPHONE ENCOUNTER
PT IS WONDERING IF DR COX CAN RAISE THE MG ON HER ZOLOFT OR IF SHE CAN DOUBLE UP AS HER ANXIETY HAS GOTTEN WORSE.     PT CALL BACK [phone]

## 2020-04-23 RX ORDER — TRAZODONE HYDROCHLORIDE 50 MG/1
TABLET ORAL
Qty: 60 TABLET | Refills: 2 | Status: SHIPPED | OUTPATIENT
Start: 2020-04-23 | End: 2021-04-19

## 2020-04-24 ENCOUNTER — TELEMEDICINE (OUTPATIENT)
Dept: INTERNAL MEDICINE | Facility: CLINIC | Age: 55
End: 2020-04-24

## 2020-04-24 DIAGNOSIS — F41.9 ANXIETY: Primary | ICD-10-CM

## 2020-04-24 DIAGNOSIS — M65.331 TRIGGER MIDDLE FINGER OF RIGHT HAND: ICD-10-CM

## 2020-04-24 DIAGNOSIS — M65.311 TRIGGER FINGER OF RIGHT THUMB: ICD-10-CM

## 2020-04-24 PROCEDURE — 99214 OFFICE O/P EST MOD 30 MIN: CPT | Performed by: INTERNAL MEDICINE

## 2020-04-24 RX ORDER — PAROXETINE HYDROCHLORIDE 40 MG/1
40 TABLET, FILM COATED ORAL EVERY MORNING
Qty: 90 TABLET | Refills: 1 | Status: SHIPPED | OUTPATIENT
Start: 2020-04-24 | End: 2020-12-22

## 2020-04-24 NOTE — PROGRESS NOTES
Chief Complaint   Patient presents with   • Anxiety   • trigger finger       History of Present Illness   Meenakshi Mae is a 54 y.o. female presents for acute needs\. She is seen today via video in place of office visit due to covid-9 pandemic. She consents for visit by video. Patient reports increase in anxious symptoms. New stressors w/ pandemic she is feeling socially isolated and misses her normal life. Her spouse has fallen multiple times while trying to work on their new home and did have some leg injuries and she is worried about him. She has been gardening and walking her dog which provides temporary relief for her. She is taking paxil at 30 mg daily and has taken this for a period of time.   Additional c/o B middle finger trigger finger. Now w/ thumb involvement as well. Saw hand surgeon last year but has been waiting on surgery. Decreasing  strength related to this.     The following portions of the patient's history were reviewed and updated as appropriate: allergies, current medications, past family history, past medical history, past social history, past surgical history and problem list.  Current Outpatient Medications on File Prior to Visit   Medication Sig Dispense Refill   • albuterol sulfate HFA (VENTOLIN HFA) 108 (90 Base) MCG/ACT inhaler Inhale 2 puffs Every 4 (Four) Hours As Needed for Wheezing. 18 g 4   • amLODIPine (NORVASC) 5 MG tablet TAKE ONE TABLET BY MOUTH DAILY 90 tablet 0   • cyclobenzaprine (FLEXERIL) 10 MG tablet TAKE 1 TABLET BY MOUTH TWICE A DAY AS NEEDED MUSCLE SPASM 30 tablet 1   • DEXILANT 60 MG capsule TAKE ONE CAPSULE BY MOUTH DAILY 90 capsule 0   • diclofenac (VOLTAREN) 1 % gel gel APPLY 4 GRAMS TO AFFECTED AREA(S) FOUR TIMES A DAY 1 tube 4   • fluticasone (FLONASE) 50 MCG/ACT nasal spray PLACE TWO SPRAYS IN EACH NOSTRIL ONCE DAILY 16 each 3   • LINZESS 145 MCG capsule TAKE ONE CAPSULE BY MOUTH DAILY 90 capsule 1   • meloxicam (MOBIC) 15 MG tablet Take 1 tablet by mouth  "Daily. 30 tablet 1   • rosuvastatin (CRESTOR) 10 MG tablet TAKE ONE TABLET BY MOUTH DAILY 90 tablet 4   • traMADol (ULTRAM) 50 MG tablet Take 1 tablet by mouth Every 6 (Six) Hours As Needed for Moderate Pain . 30 tablet 5   • traZODone (DESYREL) 50 MG tablet TAKE 2 TABLETS AT BEDTIME 60 tablet 2   • VENTOLIN  (90 Base) MCG/ACT inhaler INHALE TWO PUFFS BY MOUTH EVERY 6 HOURS 1 inhaler 4   • [DISCONTINUED] PARoxetine (PAXIL) 30 MG tablet TAKE ONE TABLET BY MOUTH EVERY MORNING 90 tablet 0     No current facility-administered medications on file prior to visit.      Review of Systems   Constitutional: Negative.    HENT: Negative.    Eyes: Negative.    Respiratory: Negative.    Cardiovascular: Negative.    Gastrointestinal: Negative.    Endocrine: Negative.    Genitourinary: Negative.    Musculoskeletal:        Finger pain and \"catching\"   Skin: Negative.    Allergic/Immunologic: Negative.    Neurological: Negative.    Hematological: Negative.    Psychiatric/Behavioral: Positive for sleep disturbance. The patient is nervous/anxious.        Objective   Physical Exam   Constitutional: She is oriented to person, place, and time. She appears well-developed and well-nourished.   HENT:   Head: Normocephalic and atraumatic.   Eyes: EOM are normal.   Neck: Normal range of motion.   Pulmonary/Chest: Effort normal.   Musculoskeletal: Normal range of motion.   Right hand in a brace. Thumb and middle finger catching after flexion then releases   Neurological: She is alert and oriented to person, place, and time.   Psychiatric: She has a normal mood and affect. Her behavior is normal. Judgment and thought content normal.        There were no vitals taken for this visit.    Assessment/Plan   Diagnoses and all orders for this visit:    Anxiety    Trigger middle finger of right hand  -     Ambulatory Referral to Hand Surgery    Trigger finger of right thumb    Other orders  -     PARoxetine (Paxil) 40 MG tablet; Take 1 tablet by " mouth Every Morning.      Patient w/ increasing anxiety. Will increase paxil to 40 mg daily. Patient commits to 10 min daily of meditation (headspace, calm, or other ap). She is to socialize more through video visits with friends. She will try new activities particularly fitness. She is to start a gratitude journal and make entries nightly. She will f/u in may as scheduled or prn. Total visit 28 min predominantly counseling.

## 2020-05-14 RX ORDER — AMLODIPINE BESYLATE 5 MG/1
TABLET ORAL
Qty: 90 TABLET | Refills: 0 | Status: SHIPPED | OUTPATIENT
Start: 2020-05-14 | End: 2021-03-09 | Stop reason: SDUPTHER

## 2020-05-14 RX ORDER — PAROXETINE 30 MG/1
TABLET, FILM COATED ORAL
Qty: 90 TABLET | Refills: 0 | Status: SHIPPED | OUTPATIENT
Start: 2020-05-14 | End: 2020-05-29 | Stop reason: DRUGHIGH

## 2020-05-18 RX ORDER — TRAMADOL HYDROCHLORIDE 50 MG/1
TABLET ORAL
Qty: 30 TABLET | Refills: 1 | Status: SHIPPED | OUTPATIENT
Start: 2020-05-18 | End: 2020-12-29

## 2020-05-19 DIAGNOSIS — E55.9 VITAMIN D DEFICIENCY: ICD-10-CM

## 2020-05-19 DIAGNOSIS — I10 ESSENTIAL HYPERTENSION: ICD-10-CM

## 2020-05-19 DIAGNOSIS — Z00.00 HEALTHCARE MAINTENANCE: Primary | ICD-10-CM

## 2020-05-21 LAB
ALBUMIN SERPL-MCNC: 4.8 G/DL (ref 3.5–5.2)
ALBUMIN/GLOB SERPL: 2 G/DL
ALP SERPL-CCNC: 123 U/L (ref 39–117)
ALT SERPL-CCNC: 24 U/L (ref 1–33)
APPEARANCE UR: CLEAR
AST SERPL-CCNC: 25 U/L (ref 1–32)
BACTERIA #/AREA URNS HPF: NORMAL /HPF
BASOPHILS # BLD AUTO: 0.06 10*3/MM3 (ref 0–0.2)
BASOPHILS NFR BLD AUTO: 0.9 % (ref 0–1.5)
BILIRUB SERPL-MCNC: 0.5 MG/DL (ref 0.2–1.2)
BILIRUB UR QL STRIP: NEGATIVE
BUN SERPL-MCNC: 6 MG/DL (ref 6–20)
BUN/CREAT SERPL: 7.8 (ref 7–25)
CALCIUM SERPL-MCNC: 10.1 MG/DL (ref 8.6–10.5)
CHLORIDE SERPL-SCNC: 101 MMOL/L (ref 98–107)
CHOLEST SERPL-MCNC: 155 MG/DL (ref 0–200)
CO2 SERPL-SCNC: 29.5 MMOL/L (ref 22–29)
COLOR UR: YELLOW
CREAT SERPL-MCNC: 0.77 MG/DL (ref 0.57–1)
EOSINOPHIL # BLD AUTO: 0.08 10*3/MM3 (ref 0–0.4)
EOSINOPHIL NFR BLD AUTO: 1.2 % (ref 0.3–6.2)
EPI CELLS #/AREA URNS HPF: NORMAL /HPF (ref 0–10)
ERYTHROCYTE [DISTWIDTH] IN BLOOD BY AUTOMATED COUNT: 14.4 % (ref 12.3–15.4)
GLOBULIN SER CALC-MCNC: 2.4 GM/DL
GLUCOSE SERPL-MCNC: 93 MG/DL (ref 65–99)
GLUCOSE UR QL: NEGATIVE
HCT VFR BLD AUTO: 37.1 % (ref 34–46.6)
HDLC SERPL-MCNC: 79 MG/DL (ref 40–60)
HGB BLD-MCNC: 12 G/DL (ref 12–15.9)
HGB UR QL STRIP: NEGATIVE
IMM GRANULOCYTES # BLD AUTO: 0.01 10*3/MM3 (ref 0–0.05)
IMM GRANULOCYTES NFR BLD AUTO: 0.2 % (ref 0–0.5)
KETONES UR QL STRIP: NEGATIVE
LDLC SERPL CALC-MCNC: 62 MG/DL (ref 0–100)
LEUKOCYTE ESTERASE UR QL STRIP: NEGATIVE
LYMPHOCYTES # BLD AUTO: 2.54 10*3/MM3 (ref 0.7–3.1)
LYMPHOCYTES NFR BLD AUTO: 39.3 % (ref 19.6–45.3)
MCH RBC QN AUTO: 27 PG (ref 26.6–33)
MCHC RBC AUTO-ENTMCNC: 32.3 G/DL (ref 31.5–35.7)
MCV RBC AUTO: 83.4 FL (ref 79–97)
MICRO URNS: NORMAL
MICRO URNS: NORMAL
MONOCYTES # BLD AUTO: 0.62 10*3/MM3 (ref 0.1–0.9)
MONOCYTES NFR BLD AUTO: 9.6 % (ref 5–12)
MUCOUS THREADS URNS QL MICRO: PRESENT /HPF
NEUTROPHILS # BLD AUTO: 3.16 10*3/MM3 (ref 1.7–7)
NEUTROPHILS NFR BLD AUTO: 48.8 % (ref 42.7–76)
NITRITE UR QL STRIP: NEGATIVE
NRBC BLD AUTO-RTO: 0.2 /100 WBC (ref 0–0.2)
PH UR STRIP: 6 [PH] (ref 5–7.5)
PLATELET # BLD AUTO: 301 10*3/MM3 (ref 140–450)
POTASSIUM SERPL-SCNC: 4.5 MMOL/L (ref 3.5–5.2)
PROT SERPL-MCNC: 7.2 G/DL (ref 6–8.5)
PROT UR QL STRIP: NEGATIVE
RBC # BLD AUTO: 4.45 10*6/MM3 (ref 3.77–5.28)
RBC #/AREA URNS HPF: NORMAL /HPF (ref 0–2)
SODIUM SERPL-SCNC: 139 MMOL/L (ref 136–145)
SP GR UR: 1.02 (ref 1–1.03)
TRIGL SERPL-MCNC: 71 MG/DL (ref 0–150)
TSH SERPL DL<=0.005 MIU/L-ACNC: 1.09 UIU/ML (ref 0.27–4.2)
URINALYSIS REFLEX: NORMAL
UROBILINOGEN UR STRIP-MCNC: 0.2 MG/DL (ref 0.2–1)
VLDLC SERPL CALC-MCNC: 14.2 MG/DL
WBC # BLD AUTO: 6.47 10*3/MM3 (ref 3.4–10.8)
WBC #/AREA URNS HPF: NORMAL /HPF (ref 0–5)

## 2020-05-22 RX ORDER — CYCLOBENZAPRINE HCL 10 MG
TABLET ORAL
Qty: 30 TABLET | Refills: 1 | Status: SHIPPED | OUTPATIENT
Start: 2020-05-22 | End: 2020-07-20

## 2020-05-29 ENCOUNTER — OFFICE VISIT (OUTPATIENT)
Dept: INTERNAL MEDICINE | Facility: CLINIC | Age: 55
End: 2020-05-29

## 2020-05-29 VITALS
OXYGEN SATURATION: 96 % | HEART RATE: 87 BPM | WEIGHT: 161 LBS | SYSTOLIC BLOOD PRESSURE: 105 MMHG | DIASTOLIC BLOOD PRESSURE: 69 MMHG | TEMPERATURE: 97.5 F | HEIGHT: 68 IN | BODY MASS INDEX: 24.4 KG/M2 | RESPIRATION RATE: 18 BRPM

## 2020-05-29 DIAGNOSIS — F32.A DEPRESSION, UNSPECIFIED DEPRESSION TYPE: ICD-10-CM

## 2020-05-29 DIAGNOSIS — J45.20 MILD INTERMITTENT REACTIVE AIRWAY DISEASE WITHOUT COMPLICATION: ICD-10-CM

## 2020-05-29 DIAGNOSIS — J30.9 ALLERGIC RHINITIS, UNSPECIFIED SEASONALITY, UNSPECIFIED TRIGGER: ICD-10-CM

## 2020-05-29 DIAGNOSIS — Z00.00 HEALTHCARE MAINTENANCE: Primary | ICD-10-CM

## 2020-05-29 DIAGNOSIS — F41.9 ANXIETY: ICD-10-CM

## 2020-05-29 DIAGNOSIS — Z12.31 ENCOUNTER FOR SCREENING MAMMOGRAM FOR BREAST CANCER: ICD-10-CM

## 2020-05-29 PROCEDURE — G0439 PPPS, SUBSEQ VISIT: HCPCS | Performed by: INTERNAL MEDICINE

## 2020-05-29 PROCEDURE — 99214 OFFICE O/P EST MOD 30 MIN: CPT | Performed by: INTERNAL MEDICINE

## 2020-05-29 PROCEDURE — 96160 PT-FOCUSED HLTH RISK ASSMT: CPT | Performed by: INTERNAL MEDICINE

## 2020-05-29 RX ORDER — MONTELUKAST SODIUM 10 MG/1
10 TABLET ORAL NIGHTLY
Qty: 90 TABLET | Refills: 1 | Status: SHIPPED | OUTPATIENT
Start: 2020-05-29 | End: 2020-12-19

## 2020-05-29 NOTE — PROGRESS NOTES
Subjective   AWV  htn  Depression and anxiety  Shortness of air  Meenakshi Mae is a 54 y.o. female who presents for an annual wellness visit as well as check up of shortness of air, depression, anxiety, hyperlipidemia, juan, insomnia .        History of Present Illness   Patient has anxiety and depression. She reports that her mood has been down related to the pandemic and social isolation. She misses getting dressed up and being with people. She is moving regularly w/ walking her dog etc. She is taking paxil daily. She takes trazodone at night. She falls asleep fairly easily but then can not stay asleep. She has been to a therapist but this has been cost prohibitive. Last session was 5 years ago.   She has some shortness of air. She has an albuterol inhaler for prn usage and has been using this 2 times daily. She does cough in the evening according to her daughter.   Patient has a new dog in the home. No known dog allergies. Some allergic rhinitis symptoms.     Review of Systems   Constitutional: Positive for fatigue.   HENT: Negative.    Eyes: Negative.    Respiratory: Positive for shortness of breath and wheezing.    Cardiovascular: Negative.    Gastrointestinal: Negative.    Endocrine: Negative.    Genitourinary: Negative.    Musculoskeletal: Negative.    Skin: Negative.    Allergic/Immunologic: Positive for environmental allergies.   Neurological: Negative.    Hematological: Negative.    Psychiatric/Behavioral: Positive for sleep disturbance. The patient is nervous/anxious.        The following portions of the patient's history were reviewed and updated as appropriate: allergies, current medications, past family history, past medical history, past social history, past surgical history and problem list.  Health maintenance tab was reviewed and updated with the patient.       Patient Active Problem List    Diagnosis Date Noted   • Bulge of lumbar disc without myelopathy 07/10/2017   • Lumbar facet arthropathy  07/10/2017   • Synovial cyst of lumbar facet joint 07/10/2017   • Osteopenia 06/09/2017   • Atopic rhinitis 12/02/2016   • Depression 12/02/2016   • Hematuria 12/02/2016   • Neuropathy 12/02/2016   • Reactive airway disease 12/02/2016   • Vitamin D deficiency 12/02/2016   • Healthcare maintenance 12/02/2016   • Insect bite 07/01/2016   • Malignant neoplasm of upper-outer quadrant of right female breast (CMS/HCC) 04/22/2016   • Essential hypertension 04/01/2016   • Chronic constipation 02/19/2016   • Nausea 02/19/2016   • Gastroesophageal reflux disease with esophagitis 02/19/2016   • Anxiety 02/19/2016       Past Medical History:   Diagnosis Date   • Anemia    • Depression    • H/O transfusion of packed red blood cells    • Headache    • History of low back pain    • Malignant neoplasm of breast (CMS/HCC) 2013    Right, T2N1M0, Stage IIB       Past Surgical History:   Procedure Laterality Date   • BREAST BIOPSY     • HAND SURGERY Left 2015   • HYSTERECTOMY  2008   • MASTECTOMY MODIFIED RADICAL Right 02/21/2013       Family History   Problem Relation Age of Onset   • Heart disease Mother    • Hypertension Mother    • Stomach cancer Father    • Ovarian cancer Maternal Aunt    • Stomach cancer Maternal Aunt        Social History     Socioeconomic History   • Marital status:      Spouse name: Not on file   • Number of children: Not on file   • Years of education: College   • Highest education level: Not on file   Occupational History   • Occupation: Manufacturing     Employer: [employer]   Tobacco Use   • Smoking status: Former Smoker     Packs/day: 1.00     Years: 6.00     Pack years: 6.00     Types: Cigarettes   • Smokeless tobacco: Former User   • Tobacco comment: Smoked from age [age].   Substance and Sexual Activity   • Alcohol use: Yes     Comment: Occasional   • Drug use: No       Current Outpatient Medications on File Prior to Visit   Medication Sig Dispense Refill   • albuterol sulfate HFA  "(VENTOLIN HFA) 108 (90 Base) MCG/ACT inhaler Inhale 2 puffs Every 4 (Four) Hours As Needed for Wheezing. 18 g 4   • amLODIPine (NORVASC) 5 MG tablet TAKE ONE TABLET BY MOUTH DAILY 90 tablet 0   • cyclobenzaprine (FLEXERIL) 10 MG tablet TAKE 1 TABLET BY MOUTH TWICE A DAY AS NEEDED MUSCLE SPASM 30 tablet 1   • DEXILANT 60 MG capsule TAKE ONE CAPSULE BY MOUTH DAILY 90 capsule 0   • diclofenac (VOLTAREN) 1 % gel gel APPLY 4 GRAMS TO AFFECTED AREA(S) FOUR TIMES A DAY 1 tube 4   • fluticasone (FLONASE) 50 MCG/ACT nasal spray PLACE TWO SPRAYS IN EACH NOSTRIL ONCE DAILY 16 each 3   • LINZESS 145 MCG capsule TAKE ONE CAPSULE BY MOUTH DAILY 90 capsule 1   • meloxicam (MOBIC) 15 MG tablet Take 1 tablet by mouth Daily. 30 tablet 1   • PARoxetine (Paxil) 40 MG tablet Take 1 tablet by mouth Every Morning. 90 tablet 1   • rosuvastatin (CRESTOR) 10 MG tablet TAKE ONE TABLET BY MOUTH DAILY 90 tablet 4   • traMADol (ULTRAM) 50 MG tablet TAKE 1 TABLET BY MOUTH EVERY 6 HRS AS NEEDED 30 tablet 1   • traZODone (DESYREL) 50 MG tablet TAKE 2 TABLETS AT BEDTIME 60 tablet 2   • VENTOLIN  (90 Base) MCG/ACT inhaler INHALE TWO PUFFS BY MOUTH EVERY 6 HOURS 1 inhaler 4   • PARoxetine (PAXIL) 30 MG tablet TAKE ONE TABLET BY MOUTH EVERY MORNING 90 tablet 0     No current facility-administered medications on file prior to visit.        Allergies   Allergen Reactions   • Erythromycin        Immunization History   Administered Date(s) Administered   • Flucelvax Quad Vial =>4yrs 11/06/2018   • Hepatitis A 10/05/2018, 07/23/2019   • Pneumococcal, Unspecified 09/30/2013   • Tdap 09/30/2013   • flucelvax quad pfs =>4 YRS 10/18/2019       Objective     /69 (BP Location: Right arm, Patient Position: Sitting, Cuff Size: Adult)   Pulse 87   Temp 97.5 °F (36.4 °C) (Temporal)   Resp 18   Ht 172.7 cm (68\")   Wt 73 kg (161 lb)   SpO2 96%   BMI 24.48 kg/m²     Physical Exam   Constitutional: She is oriented to person, place, and time. She " appears well-developed and well-nourished.   HENT:   Head: Normocephalic and atraumatic.   Right Ear: Hearing, tympanic membrane and external ear normal.   Left Ear: Hearing, tympanic membrane and external ear normal.   Nose: Nose normal.   Mouth/Throat: Oropharynx is clear and moist.   Eyes: Pupils are equal, round, and reactive to light. Conjunctivae and EOM are normal.   Neck: Normal range of motion. Neck supple. No thyromegaly present.   Cardiovascular: Normal rate, regular rhythm, normal heart sounds and intact distal pulses.   No murmur heard.  Pulmonary/Chest: Effort normal and breath sounds normal. Right breast exhibits no mass. Left breast exhibits no mass. No breast tenderness.   Abdominal: Soft. Bowel sounds are normal. She exhibits no distension. There is no hepatosplenomegaly. There is no tenderness.   Genitourinary: No breast tenderness.   Musculoskeletal: Normal range of motion.   Lymphadenopathy:     She has no cervical adenopathy.   Neurological: She is alert and oriented to person, place, and time.   Skin: Skin is warm and dry.   Psychiatric: She has a normal mood and affect. Her speech is normal and behavior is normal. Judgment and thought content normal. Cognition and memory are normal.   Nursing note and vitals reviewed.      Assessment/Plan   Meenakshi was seen today for annual exam.    Diagnoses and all orders for this visit:    Healthcare maintenance    Encounter for screening mammogram for breast cancer  -     Mammo screening digital tomosynthesis bilateral w CAD    Essential hypertension    Anxiety    Depression, unspecified depression type        Discussion    AWV.  See below for evan history, PHQ-9, functional ability questionnaire, cognitive impairment screening.  Direct observation of cognitive abilities:  Normal.These were all reviewed with the patient and the patient was provided with a personal prevention plan of service in patient instructions.  Patient was given advice or handouts  on the following topics:  nutrition, fall prevention, exercise, weight management.   ACP discussion was held with the patient during this visit. Patient does not have an advance directive, information provided..    I have recommended that the patient get the following immunizations:  Shingrix.  Patient w/ hyperlipidemia. She will continue rosuvastatin as she is well at goal level. She will continue paxil at 40 mg and is advised to seek out a mental health therapist through her insurance plan. She is having increasing usage of her inhaler. Will start singulair and qvar and send to allergist for pft and allergy testing. Will see if this helps w/ sleep at night given cough noted by daughter. She will follow up routinely.   jw    Depression Screen:    PHQ-2/PHQ-9 Depression Screening 5/29/2020   Little interest or pleasure in doing things 0   Feeling down, depressed, or hopeless 0   Total Score 0       Fall Risk Screen:  CANDI Fall Risk Assessment has not been completed.    Health Habits and Functional/Cognitive screen:  Functional & Cognitive Status 5/29/2020   Do you have difficulty preparing food and eating? No   Do you have difficulty bathing yourself, getting dressed or grooming yourself? No   Do you have difficulty using the toilet? No   Do you have difficulty moving around from place to place? No   Do you have trouble with steps or getting out of a bed or a chair? No   Current Diet Well Balanced Diet   Dental Exam Up to date   Eye Exam Not up to date   Exercise (times per week) 7 times per week   Current Exercise Activities Include Walking   Do you need help using the phone?  No   Are you deaf or do you have serious difficulty hearing?  No   Do you need help with transportation? No   Do you need help shopping? No   Do you need help preparing meals?  No   Do you need help with housework?  No   Do you need help with laundry? No   Do you need help taking your medications? No   Do you need help managing money? No    Do you ever drive or ride in a car without wearing a seat belt? No   Have you felt unusual stress, anger or loneliness in the last month? No   Who do you live with? Spouse   If you need help, do you have trouble finding someone available to you? No   Have you been bothered in the last four weeks by sexual problems? No   Do you have difficulty concentrating, remembering or making decisions? No       Health Maintenance   Topic Date Due   • ZOSTER VACCINE (1 of 2) 07/02/2013   • DXA SCAN  06/02/2019   • PAP SMEAR  12/02/2019   • MEDICARE ANNUAL WELLNESS  03/22/2020   • INFLUENZA VACCINE  08/01/2020   • MAMMOGRAM  03/28/2021   • LIPID PANEL  05/20/2021   • TDAP/TD VACCINES (2 - Td) 09/30/2023   • COLONOSCOPY  01/19/2025   • HEPATITIS C SCREENING  Completed            No future appointments.

## 2020-06-03 ENCOUNTER — HOSPITAL ENCOUNTER (OUTPATIENT)
Dept: MAMMOGRAPHY | Facility: HOSPITAL | Age: 55
Discharge: HOME | End: 2020-06-03
Admitting: INTERNAL MEDICINE

## 2020-06-03 PROCEDURE — 77063 BREAST TOMOSYNTHESIS BI: CPT

## 2020-06-03 PROCEDURE — 77067 SCR MAMMO BI INCL CAD: CPT

## 2020-07-20 RX ORDER — CYCLOBENZAPRINE HCL 10 MG
TABLET ORAL
Qty: 30 TABLET | Refills: 1 | Status: SHIPPED | OUTPATIENT
Start: 2020-07-20 | End: 2020-09-07

## 2020-07-25 ENCOUNTER — APPOINTMENT (OUTPATIENT)
Dept: MRI IMAGING | Facility: HOSPITAL | Age: 55
End: 2020-07-25

## 2020-07-25 ENCOUNTER — HOSPITAL ENCOUNTER (EMERGENCY)
Facility: HOSPITAL | Age: 55
Discharge: HOME | End: 2020-07-25
Attending: EMERGENCY MEDICINE | Admitting: EMERGENCY MEDICINE

## 2020-07-25 ENCOUNTER — APPOINTMENT (OUTPATIENT)
Dept: GENERAL RADIOLOGY | Facility: HOSPITAL | Age: 55
End: 2020-07-25

## 2020-07-25 VITALS
SYSTOLIC BLOOD PRESSURE: 131 MMHG | WEIGHT: 160.27 LBS | TEMPERATURE: 98.2 F | OXYGEN SATURATION: 100 % | HEIGHT: 68 IN | HEART RATE: 102 BPM | DIASTOLIC BLOOD PRESSURE: 89 MMHG | BODY MASS INDEX: 24.29 KG/M2 | RESPIRATION RATE: 18 BRPM

## 2020-07-25 DIAGNOSIS — K12.2 MOUTH ABSCESS: Primary | ICD-10-CM

## 2020-07-25 DIAGNOSIS — R20.2 PARESTHESIA: ICD-10-CM

## 2020-07-25 LAB
ALBUMIN SERPL-MCNC: 4.3 G/DL (ref 3.5–5.2)
ALBUMIN/GLOB SERPL: 1.2 G/DL
ALP SERPL-CCNC: 120 U/L (ref 39–117)
ALT SERPL W P-5'-P-CCNC: 13 U/L (ref 1–33)
ANION GAP SERPL CALCULATED.3IONS-SCNC: 15 MMOL/L (ref 5–15)
AST SERPL-CCNC: 12 U/L (ref 1–32)
BASOPHILS # BLD AUTO: 0.1 10*3/MM3 (ref 0–0.2)
BASOPHILS NFR BLD AUTO: 0.4 % (ref 0–1.5)
BILIRUB SERPL-MCNC: 0.8 MG/DL (ref 0–1.2)
BUN SERPL-MCNC: 9 MG/DL (ref 6–20)
BUN SERPL-MCNC: ABNORMAL MG/DL
BUN/CREAT SERPL: ABNORMAL
CALCIUM SPEC-SCNC: 9.6 MG/DL (ref 8.6–10.5)
CHLORIDE SERPL-SCNC: 100 MMOL/L (ref 98–107)
CO2 SERPL-SCNC: 25 MMOL/L (ref 22–29)
CREAT SERPL-MCNC: 0.82 MG/DL (ref 0.57–1)
DEPRECATED RDW RBC AUTO: 45.1 FL (ref 37–54)
EOSINOPHIL # BLD AUTO: 0.1 10*3/MM3 (ref 0–0.4)
EOSINOPHIL NFR BLD AUTO: 0.6 % (ref 0.3–6.2)
ERYTHROCYTE [DISTWIDTH] IN BLOOD BY AUTOMATED COUNT: 15.3 % (ref 12.3–15.4)
GFR SERPL CREATININE-BSD FRML MDRD: 87 ML/MIN/1.73
GLOBULIN UR ELPH-MCNC: 3.7 GM/DL
GLUCOSE BLDC GLUCOMTR-MCNC: 109 MG/DL (ref 70–105)
GLUCOSE SERPL-MCNC: 112 MG/DL (ref 65–99)
HCT VFR BLD AUTO: 43.6 % (ref 34–46.6)
HGB BLD-MCNC: 14.5 G/DL (ref 12–15.9)
LYMPHOCYTES # BLD AUTO: 3.4 10*3/MM3 (ref 0.7–3.1)
LYMPHOCYTES NFR BLD AUTO: 21.1 % (ref 19.6–45.3)
MCH RBC QN AUTO: 28 PG (ref 26.6–33)
MCHC RBC AUTO-ENTMCNC: 33.2 G/DL (ref 31.5–35.7)
MCV RBC AUTO: 84.4 FL (ref 79–97)
MONOCYTES # BLD AUTO: 1.6 10*3/MM3 (ref 0.1–0.9)
MONOCYTES NFR BLD AUTO: 9.8 % (ref 5–12)
NEUTROPHILS NFR BLD AUTO: 10.8 10*3/MM3 (ref 1.7–7)
NEUTROPHILS NFR BLD AUTO: 68.1 % (ref 42.7–76)
NRBC BLD AUTO-RTO: 0.1 /100 WBC (ref 0–0.2)
PLATELET # BLD AUTO: 354 10*3/MM3 (ref 140–450)
PMV BLD AUTO: 8 FL (ref 6–12)
POTASSIUM SERPL-SCNC: 4.3 MMOL/L (ref 3.5–5.2)
PROT SERPL-MCNC: 8 G/DL (ref 6–8.5)
RBC # BLD AUTO: 5.17 10*6/MM3 (ref 3.77–5.28)
SARS-COV-2 RNA PNL SPEC NAA+PROBE: NOT DETECTED
SODIUM SERPL-SCNC: 140 MMOL/L (ref 136–145)
TROPONIN T SERPL-MCNC: <0.01 NG/ML (ref 0–0.03)
WBC # BLD AUTO: 15.8 10*3/MM3 (ref 3.4–10.8)

## 2020-07-25 PROCEDURE — 80053 COMPREHEN METABOLIC PANEL: CPT | Performed by: EMERGENCY MEDICINE

## 2020-07-25 PROCEDURE — 36415 COLL VENOUS BLD VENIPUNCTURE: CPT

## 2020-07-25 PROCEDURE — 70551 MRI BRAIN STEM W/O DYE: CPT

## 2020-07-25 PROCEDURE — 96365 THER/PROPH/DIAG IV INF INIT: CPT

## 2020-07-25 PROCEDURE — 93005 ELECTROCARDIOGRAM TRACING: CPT | Performed by: EMERGENCY MEDICINE

## 2020-07-25 PROCEDURE — 84484 ASSAY OF TROPONIN QUANT: CPT | Performed by: EMERGENCY MEDICINE

## 2020-07-25 PROCEDURE — 87040 BLOOD CULTURE FOR BACTERIA: CPT | Performed by: EMERGENCY MEDICINE

## 2020-07-25 PROCEDURE — 85025 COMPLETE CBC W/AUTO DIFF WBC: CPT | Performed by: EMERGENCY MEDICINE

## 2020-07-25 PROCEDURE — 99284 EMERGENCY DEPT VISIT MOD MDM: CPT

## 2020-07-25 PROCEDURE — 71045 X-RAY EXAM CHEST 1 VIEW: CPT

## 2020-07-25 PROCEDURE — 87635 SARS-COV-2 COVID-19 AMP PRB: CPT | Performed by: EMERGENCY MEDICINE

## 2020-07-25 PROCEDURE — 82962 GLUCOSE BLOOD TEST: CPT

## 2020-07-25 RX ORDER — CLINDAMYCIN HYDROCHLORIDE 300 MG/1
300 CAPSULE ORAL 4 TIMES DAILY
Qty: 20 CAPSULE | Refills: 0 | Status: SHIPPED | OUTPATIENT
Start: 2020-07-25 | End: 2020-07-25 | Stop reason: ALTCHOICE

## 2020-07-25 RX ORDER — AMOXICILLIN AND CLAVULANATE POTASSIUM 875; 125 MG/1; MG/1
1 TABLET, FILM COATED ORAL 2 TIMES DAILY
Qty: 10 TABLET | Refills: 0 | Status: SHIPPED | OUTPATIENT
Start: 2020-07-25 | End: 2020-08-31

## 2020-07-25 RX ORDER — CLINDAMYCIN PHOSPHATE 900 MG/50ML
900 INJECTION, SOLUTION INTRAVENOUS ONCE
Status: COMPLETED | OUTPATIENT
Start: 2020-07-25 | End: 2020-07-25

## 2020-07-25 RX ORDER — HYDROCODONE BITARTRATE AND ACETAMINOPHEN 5; 325 MG/1; MG/1
1 TABLET ORAL EVERY 6 HOURS PRN
Qty: 12 TABLET | Refills: 0 | Status: SHIPPED | OUTPATIENT
Start: 2020-07-25 | End: 2020-08-31

## 2020-07-25 RX ORDER — SODIUM CHLORIDE 0.9 % (FLUSH) 0.9 %
10 SYRINGE (ML) INJECTION AS NEEDED
Status: DISCONTINUED | OUTPATIENT
Start: 2020-07-25 | End: 2020-07-25 | Stop reason: HOSPADM

## 2020-07-25 RX ADMIN — SODIUM CHLORIDE 1000 ML: 900 INJECTION, SOLUTION INTRAVENOUS at 09:39

## 2020-07-25 RX ADMIN — CLINDAMYCIN PHOSPHATE 900 MG: 900 INJECTION, SOLUTION INTRAVENOUS at 12:26

## 2020-07-25 RX ADMIN — BENZOCAINE, BUTAMBEN, AND TETRACAINE HYDROCHLORIDE: .028; .004; .004 AEROSOL, SPRAY TOPICAL at 12:26

## 2020-07-25 NOTE — ED PROVIDER NOTES
Subjective   Chief complaint sinus infection numbness to face sore in the roof of my mouth    History of present illness this is a 55-year-old female who states that since Thursday she has had some sinus drainage and pressure sore in the roof of her mouth and some numbness to the left side of her face.  She denies any trouble speaking no trouble talking no trouble walking or using her arms or legs some cough no shortness of breath.  No chest pain neck arm or jaw pain no fevers or chills.  No antibiotic use.  Nothing makes it better or worse and ongoing continuous since Thursday          Review of Systems   Constitutional: Negative for chills and fever.   HENT: Positive for congestion, sinus pressure and sinus pain.    Eyes: Negative for photophobia and visual disturbance.   Respiratory: Positive for cough and shortness of breath. Negative for chest tightness.    Cardiovascular: Negative for chest pain and leg swelling.   Gastrointestinal: Negative for abdominal pain and vomiting.   Endocrine: Negative for cold intolerance and heat intolerance.   Genitourinary: Negative for difficulty urinating and dysuria.   Musculoskeletal: Negative for arthralgias and back pain.   Skin: Negative for color change and pallor.   Neurological: Negative for dizziness, facial asymmetry, speech difficulty and weakness.   Psychiatric/Behavioral: Negative for agitation and behavioral problems.       Past Medical History:   Diagnosis Date   • Anemia    • Depression    • H/O transfusion of packed red blood cells    • Headache    • History of low back pain    • Malignant neoplasm of breast (CMS/HCC) 2013    Right, T2N1M0, Stage IIB       Allergies   Allergen Reactions   • Erythromycin Hives       Past Surgical History:   Procedure Laterality Date   • BREAST BIOPSY     • HAND SURGERY Left 2015   • HYSTERECTOMY  2008   • MASTECTOMY MODIFIED RADICAL Right 02/21/2013       Family History   Problem Relation Age of Onset   • Heart disease Mother     • Hypertension Mother    • Stomach cancer Father    • Ovarian cancer Maternal Aunt    • Stomach cancer Maternal Aunt        Social History     Socioeconomic History   • Marital status:      Spouse name: Not on file   • Number of children: Not on file   • Years of education: College   • Highest education level: Not on file   Occupational History   • Occupation: Manufacturing     Employer: [employer]   Tobacco Use   • Smoking status: Former Smoker     Packs/day: 1.00     Years: 6.00     Pack years: 6.00     Types: Cigarettes   • Smokeless tobacco: Former User   • Tobacco comment: Smoked from age [age].   Substance and Sexual Activity   • Alcohol use: Yes     Comment: Occasional   • Drug use: No           Objective   Physical Exam  55-year-old female awake alert no distress HEENT extraocular muscles intact pupils equal round react there is no nystagmus and disc are sharp TMs are clear mouth is clear other than a small infection to the hard palate just behind the central incisors is not fluctuant there is no crepitus or subcu air soft and had some drainage to it.  No trismus tongue normal uvula midline soft palate rises normally neck supple no adenopathy no JVD no bruits lungs clear no retractions heart regular without murmur slight tachycardic abdomen soft without tenderness or masses extremities pulses are equal upper and lower extremities no edema cords or Homans sign is DVT.  Patient's awake alert orientated x4 no facial asymmetry normal speech normal tongue normal uvula peripheral vision is intact confrontation TMs clear and a patient has no drift the arms or legs normal finger-to-nose no truncal ataxia NIH is 0 patient able to walk without difficulty.  Patient drove herself here.  Procedures           ED Course      Results for orders placed or performed during the hospital encounter of 07/25/20   COVID-19, [facility] IN-HOUSE,NP Swab (NO TRANSPORT MEDIA) 2 HR TAT - Swab, Nasopharynx   Result Value Ref  Range    COVID19 Not Detected Not Detected - Ref. Range   Comprehensive Metabolic Panel   Result Value Ref Range    Glucose 112 (H) 65 - 99 mg/dL    BUN      Creatinine 0.82 0.57 - 1.00 mg/dL    Sodium 140 136 - 145 mmol/L    Potassium 4.3 3.5 - 5.2 mmol/L    Chloride 100 98 - 107 mmol/L    CO2 25.0 22.0 - 29.0 mmol/L    Calcium 9.6 8.6 - 10.5 mg/dL    Total Protein 8.0 6.0 - 8.5 g/dL    Albumin 4.30 3.50 - 5.20 g/dL    ALT (SGPT) 13 1 - 33 U/L    AST (SGOT) 12 1 - 32 U/L    Alkaline Phosphatase 120 (H) 39 - 117 U/L    Total Bilirubin 0.8 0.0 - 1.2 mg/dL    eGFR  African Amer 87 >60 mL/min/1.73    Globulin 3.7 gm/dL    A/G Ratio 1.2 g/dL    BUN/Creatinine Ratio      Anion Gap 15.0 5.0 - 15.0 mmol/L   Troponin   Result Value Ref Range    Troponin T <0.010 0.000 - 0.030 ng/mL   CBC Auto Differential   Result Value Ref Range    WBC 15.80 (H) 3.40 - 10.80 10*3/mm3    RBC 5.17 3.77 - 5.28 10*6/mm3    Hemoglobin 14.5 12.0 - 15.9 g/dL    Hematocrit 43.6 34.0 - 46.6 %    MCV 84.4 79.0 - 97.0 fL    MCH 28.0 26.6 - 33.0 pg    MCHC 33.2 31.5 - 35.7 g/dL    RDW 15.3 12.3 - 15.4 %    RDW-SD 45.1 37.0 - 54.0 fl    MPV 8.0 6.0 - 12.0 fL    Platelets 354 140 - 450 10*3/mm3    Neutrophil % 68.1 42.7 - 76.0 %    Lymphocyte % 21.1 19.6 - 45.3 %    Monocyte % 9.8 5.0 - 12.0 %    Eosinophil % 0.6 0.3 - 6.2 %    Basophil % 0.4 0.0 - 1.5 %    Neutrophils, Absolute 10.80 (H) 1.70 - 7.00 10*3/mm3    Lymphocytes, Absolute 3.40 (H) 0.70 - 3.10 10*3/mm3    Monocytes, Absolute 1.60 (H) 0.10 - 0.90 10*3/mm3    Eosinophils, Absolute 0.10 0.00 - 0.40 10*3/mm3    Basophils, Absolute 0.10 0.00 - 0.20 10*3/mm3    nRBC 0.1 0.0 - 0.2 /100 WBC   BUN   Result Value Ref Range    BUN 9 6 - 20 mg/dL   POC Glucose Once   Result Value Ref Range    Glucose 109 (H) 70 - 105 mg/dL     Mri Brain Without Contrast    Result Date: 7/25/2020  No evidence of hemorrhage, mass effect or midline shift. No evidence of recent or acute ischemia.  Minimal periventricular and  subcortical FLAIR signal changes likely related to chronic microvascular ischemic change.   Electronically Signed By-Ibeth García On:7/25/2020 11:55 AM This report was finalized on [id number] by  Ibeth García, .    Xr Chest 1 View    Result Date: 7/25/2020  Questionable mild infiltrate within the left lung base. Acute viral illness is in the differential.  Electronically Signed By-Ibeth García On:7/25/2020 9:56 AM This report was finalized on [id number] by  Ibeth García, .    Medications   sodium chloride 0.9 % flush 10 mL (has no administration in time range)   clindamycin (CLEOCIN) 900 mg in sodium chloride 0.9% 50 mL IVPB (premix) (900 mg Intravenous New Bag 7/25/20 1226)   sodium chloride 0.9 % bolus 1,000 mL (0 mL Intravenous Stopped 7/25/20 1219)   butamben-tetracaine-benzocaine (CETACAINE) 2-2-14 % spray ( Topical Given 7/25/20 1226)            EKG my interpretation normal sinus rhythm rate of 110 normal axis hypertrophy QTC of 448 normal EKG                                MDM  Number of Diagnoses or Management Options  Mouth abscess:   Paresthesia:   Diagnosis management comments: Medical decision making.  Patient IV normal saline 1 L saline and she had the above exam evaluation.  Patient CBC unremarkable and white count of 15,000 and her electrolytes troponin all within normal limits chest x-ray questionable left lower lobe infiltrate COVID-19 was negative chemistries unremarkable MRI of the brain showed no acute stroke.  The patient given clindamycin 900 mg IV.  We talked about the findings I will see evidence of Bell's palsy or acute stroke no evidence of suggest acute DVT or pulmonary embolism she states her main complaint is numbness to her face mainly on the left side she has a sore in the top of her mouth and some sinus drainage.  I do not sense acute cardiac ischemia DVT pulmonary embolism stroke meningitis encephalitis.  Patient had consent to open that abscess.  We talked about the risk  benefits complications alternative treatments she voiced understanding and agreed to have it open.  Patient had that sprayed with Cetacaine spray and 11 blade made a small incision to open at wound.  Tolerated well without problems or complications.  She will be discharged home for outpatient follow-up and oral surgery referral and antibiotic therapy we talked about what to return for.  Repeat examination patient resting comfortably respiratory rate of 18 heart rate of 95.  Blood pressure looks well.  Stable otherwise unremarkable improved ER course.  We talked about strokelike symptoms we talked about Bell's palsy and other possible etiologies.  Inspect reviewed       Amount and/or Complexity of Data Reviewed  Clinical lab tests: reviewed  Tests in the radiology section of CPT®: reviewed  Tests in the medicine section of CPT®: reviewed        Final diagnoses:   Paresthesia   Mouth abscess            Pieter Garcia MD  07/25/20 1234       Pieter Garcia MD  07/25/20 1238

## 2020-07-25 NOTE — DISCHARGE INSTRUCTIONS
Augmentin and Norco.  Please do not combine Norco with any other pain medication.  Follow-up as directed above.  Return for fever increasing pain redness swelling shortness of breath tongue or throat swelling no improvement over next 24 hours or any other new or worse problems or concerns.  Return for any severe headache visual changes speech difficulty paralysis weakness unable to walk or any other new or worse problems or concerns

## 2020-07-27 ENCOUNTER — EPISODE CHANGES (OUTPATIENT)
Dept: CASE MANAGEMENT | Facility: OTHER | Age: 55
End: 2020-07-27

## 2020-07-27 ENCOUNTER — TELEPHONE (OUTPATIENT)
Dept: INTERNAL MEDICINE | Facility: CLINIC | Age: 55
End: 2020-07-27

## 2020-07-27 NOTE — TELEPHONE ENCOUNTER
----- Message from Vandana Zavaleta MD sent at 7/26/2020  4:38 PM EDT -----  Please advise patient that her covid testing is NEGATIVE.

## 2020-07-29 ENCOUNTER — EPISODE CHANGES (OUTPATIENT)
Dept: CASE MANAGEMENT | Facility: OTHER | Age: 55
End: 2020-07-29

## 2020-07-29 ENCOUNTER — PATIENT OUTREACH (OUTPATIENT)
Dept: CASE MANAGEMENT | Facility: OTHER | Age: 55
End: 2020-07-29

## 2020-07-29 DIAGNOSIS — R11.0 NAUSEA: ICD-10-CM

## 2020-07-29 DIAGNOSIS — K21.00 GASTROESOPHAGEAL REFLUX DISEASE WITH ESOPHAGITIS: ICD-10-CM

## 2020-07-29 RX ORDER — DEXLANSOPRAZOLE 60 MG/1
CAPSULE, DELAYED RELEASE ORAL
Qty: 90 CAPSULE | Refills: 0 | Status: SHIPPED | OUTPATIENT
Start: 2020-07-29 | End: 2022-08-26 | Stop reason: ALTCHOICE

## 2020-07-29 NOTE — OUTREACH NOTE
ED Potential Covid Discharge Follow-up    COVID 19 Teaching:    REVIEW of SYMPTOMS OF COVID !(:  Fever, Cough, Difficulty Breathing, Fatigue, Chills, Muscle aches, Headaches, Sore throat, Problems with sense of smell or taste, GI disturbances, Runny Nose.  Reports chest congestion and cough subsiding. Has been notified of negative COVID 19 test results from ED staff.  Adherent to antibiotic regimen and teaching related to importance of continuing to complete entire regimen as prescribed and serious consequences if medication discontinued prior to completion.  She voices understanding of directive. Teaching related to avoiding respiratory irritants, environmental allergens, and avoidance of outdoors on air quality alert days.    PREVENTIVE MEASURES: Practice Social Distancing or PHYSICAL DISTANCING.  STAY at least six feet apart.  If you need to go out, wear cloth face covering that covers your nose and mouth.  Wash your hands often withy soap and water for at least 20 seconds,or use an alcohol based hand  that contains at least 60% alcohol.  Avoid touching your face, Avoid traveling, Clean and disinfect frequently touched surfaces with Clorox wipes or Lysol  EXPOSURE:  Self quarantine for at least 14 days, monitor for symptoms and contact your physician or nurse right away with the start of symptoms.   POSITIVE COVID 19 Results:  Self isolate in a separate room and different bathroom if possible.  Eat in your own room.  Stay away from pets as well.  If you have to be out of the room , cover your nose and mouth with a cloth mask. . Wash your hands with soap and water often.  Clean often and use disposable gloves for you laundry, dishes, utensils, or trash.  Make sure to dispose of gloves correctly with each individual task.   SUSPECTED COVID 19 and AWAITING TEST RESULTS:  Quarantine away from others. Monitor for symptoms  and your temperature at least twice a day,   RESOURCES: Review of ED instructions and  "advised to schedule an appt with oral surgeon, Dr. Cabezas, related to oral abscess which she states,\"thoguht I only had one but there are two.\"   She has verified the contact information including address, offered  to schedule the appt, she declined as prefers to schedule on her own.  Advised follow-up with Dr. Zavaleta if symptoms or worsen or fail to improve. Aware that Dr. Zavaleta has been informed of negative COVID 19 test results. Denies needs for additional community resources, adherent to CDC guidelines, and states her daughter is very supportive. Active with My Chart.  Declined phone contact information to Avril CARLISLE. ACM contact information provided.   ROUTE CALL TO PCP  ACM:  Steven Bonilla RN, BSN, [facility]  [phone]          Steven Bonilla RN  Ambulatory     7/29/2020, 10:12      "

## 2020-07-30 LAB
BACTERIA SPEC AEROBE CULT: NORMAL
BACTERIA SPEC AEROBE CULT: NORMAL

## 2020-08-31 ENCOUNTER — OFFICE VISIT (OUTPATIENT)
Dept: INTERNAL MEDICINE | Facility: CLINIC | Age: 55
End: 2020-08-31

## 2020-08-31 VITALS
HEART RATE: 102 BPM | BODY MASS INDEX: 26.07 KG/M2 | WEIGHT: 172 LBS | HEIGHT: 68 IN | TEMPERATURE: 97.3 F | SYSTOLIC BLOOD PRESSURE: 150 MMHG | OXYGEN SATURATION: 97 % | DIASTOLIC BLOOD PRESSURE: 90 MMHG

## 2020-08-31 DIAGNOSIS — G89.18 POST-OPERATIVE PAIN: Primary | ICD-10-CM

## 2020-08-31 DIAGNOSIS — I10 HYPERTENSION, UNSPECIFIED TYPE: ICD-10-CM

## 2020-08-31 DIAGNOSIS — J45.20 MILD INTERMITTENT ASTHMA WITHOUT COMPLICATION: ICD-10-CM

## 2020-08-31 PROCEDURE — 99214 OFFICE O/P EST MOD 30 MIN: CPT | Performed by: INTERNAL MEDICINE

## 2020-08-31 RX ORDER — HYDROCODONE BITARTRATE AND ACETAMINOPHEN 5; 325 MG/1; MG/1
1 TABLET ORAL EVERY 8 HOURS PRN
Qty: 20 TABLET | Refills: 0 | Status: SHIPPED | OUTPATIENT
Start: 2020-08-31 | End: 2021-04-19

## 2020-08-31 RX ORDER — TRIAMCINOLONE ACETONIDE 55 UG/1
2 SPRAY, METERED NASAL DAILY
Qty: 16.5 G | Refills: 11 | Status: SHIPPED | OUTPATIENT
Start: 2020-08-31 | End: 2021-04-19 | Stop reason: SDUPTHER

## 2020-08-31 NOTE — PROGRESS NOTES
Chief Complaint   Patient presents with   • Hypertension   • Post-op     right hand surgery       History of Present Illness   Meenakshi Mae is a 55 y.o. female presents for post operative evaluation. She had right hand surgery for carpal tunnel and trigger finger approx one week ago. She is doing well today. Notes some pain post operatively. She was not given narcotics as she previously had hydrocodone rx here.   Patient has allergic rhinitis and asthma. She was considering getting allergy testing but she has concerns of cost w/ copayments. She follows w/ allergist regarding asthma. She uses flovent twice daily.   Has hypertension. She takes amlodipine but thought that this was for hot flash so only taking intermittently.     The following portions of the patient's history were reviewed and updated as appropriate: allergies, current medications, past family history, past medical history, past social history, past surgical history and problem list.  Current Outpatient Medications on File Prior to Visit   Medication Sig Dispense Refill   • albuterol sulfate HFA (VENTOLIN HFA) 108 (90 Base) MCG/ACT inhaler Inhale 2 puffs Every 4 (Four) Hours As Needed for Wheezing. 18 g 4   • amLODIPine (NORVASC) 5 MG tablet TAKE ONE TABLET BY MOUTH DAILY 90 tablet 0   • cyclobenzaprine (FLEXERIL) 10 MG tablet TAKE 1 TABLET BY MOUTH TWICE A DAY AS NEEDED MUSCLE SPASM 30 tablet 1   • DEXILANT 60 MG capsule TAKE 1 CAPSULE BY MOUTH EVERY DAY 90 capsule 0   • diclofenac (VOLTAREN) 1 % gel gel APPLY 4 GRAMS TO AFFECTED AREA(S) FOUR TIMES A DAY 1 tube 4   • fluticasone (FLONASE) 50 MCG/ACT nasal spray PLACE TWO SPRAYS IN EACH NOSTRIL ONCE DAILY 16 each 3   • fluticasone (Flovent Diskus) 50 MCG/BLIST diskus inhaler Inhale 1 puff 2 (Two) Times a Day. 1 each 12   • LINZESS 145 MCG capsule TAKE ONE CAPSULE BY MOUTH DAILY 90 capsule 1   • meloxicam (MOBIC) 15 MG tablet Take 1 tablet by mouth Daily. 30 tablet 1   • montelukast (Singulair) 10  MG tablet Take 1 tablet by mouth Every Night. 90 tablet 1   • PARoxetine (Paxil) 40 MG tablet Take 1 tablet by mouth Every Morning. 90 tablet 1   • rosuvastatin (CRESTOR) 10 MG tablet TAKE ONE TABLET BY MOUTH DAILY 90 tablet 4   • traMADol (ULTRAM) 50 MG tablet TAKE 1 TABLET BY MOUTH EVERY 6 HRS AS NEEDED 30 tablet 1   • traZODone (DESYREL) 50 MG tablet TAKE 2 TABLETS AT BEDTIME 60 tablet 2   • [DISCONTINUED] HYDROcodone-acetaminophen (NORCO) 5-325 MG per tablet Take 1 tablet by mouth Every 6 (Six) Hours As Needed for Severe Pain . 12 tablet 0   • [DISCONTINUED] amoxicillin-clavulanate (AUGMENTIN) 875-125 MG per tablet Take 1 tablet by mouth 2 (Two) Times a Day. 10 tablet 0     No current facility-administered medications on file prior to visit.      Review of Systems   Constitutional: Negative.    HENT: Negative.    Eyes: Negative.    Respiratory: Negative.    Cardiovascular: Negative.    Gastrointestinal: Negative.    Endocrine: Negative.    Genitourinary: Negative.    Musculoskeletal: Negative.    Skin: Negative.    Allergic/Immunologic: Negative.    Neurological: Negative.    Hematological: Negative.    Psychiatric/Behavioral: Negative.        Objective   Physical Exam   Constitutional: She is oriented to person, place, and time. She appears well-developed and well-nourished.   HENT:   Head: Normocephalic and atraumatic.   Right Ear: External ear normal.   Left Ear: External ear normal.   Mouth/Throat: Oropharynx is clear and moist.   Eyes: Pupils are equal, round, and reactive to light. EOM are normal.   Neck: Normal range of motion. Neck supple.   Cardiovascular: Normal rate, regular rhythm and normal heart sounds.   Pulmonary/Chest: Effort normal and breath sounds normal.   Abdominal: Soft. Bowel sounds are normal.   Musculoskeletal: Normal range of motion.   Neurological: She is alert and oriented to person, place, and time.   Skin: Skin is warm and dry.   Psychiatric: She has a normal mood and affect. Her  "behavior is normal. Judgment and thought content normal.   Nursing note and vitals reviewed.       /90   Pulse 102   Temp 97.3 °F (36.3 °C)   Ht 172.7 cm (68\")   Wt 78 kg (172 lb)   SpO2 97%   BMI 26.15 kg/m²     Assessment/Plan   Diagnoses and all orders for this visit:    Post-operative pain    Hypertension, unspecified type    Mild intermittent asthma without complication    Other orders  -     HYDROcodone-acetaminophen (NORCO) 5-325 MG per tablet; Take 1 tablet by mouth Every 8 (Eight) Hours As Needed for Moderate Pain .  -     Triamcinolone Acetonide (NASACORT) 55 MCG/ACT nasal inhaler; 2 sprays into the nostril(s) as directed by provider Daily.      Patient with post operative right hand pain. She will try hydrocodone prn pain. She is aware of the risks and benefits of the medication. She has not had any issues w/ dependence or abuse. A ROMULO report has been requested. She has asthma. Will use flovent twice daily and rinse mouth after. Continue singulair daily. Start flonase 2 spray each nostril daily. She will start amlodipine one daily. She will monitor bp.            "

## 2020-09-07 RX ORDER — CYCLOBENZAPRINE HCL 10 MG
TABLET ORAL
Qty: 30 TABLET | Refills: 4 | Status: SHIPPED | OUTPATIENT
Start: 2020-09-07 | End: 2020-12-04

## 2020-09-10 ENCOUNTER — FLU SHOT (OUTPATIENT)
Dept: INTERNAL MEDICINE | Facility: CLINIC | Age: 55
End: 2020-09-10

## 2020-09-10 PROCEDURE — G0008 ADMIN INFLUENZA VIRUS VAC: HCPCS | Performed by: INTERNAL MEDICINE

## 2020-09-10 PROCEDURE — 90686 IIV4 VACC NO PRSV 0.5 ML IM: CPT | Performed by: INTERNAL MEDICINE

## 2020-10-30 ENCOUNTER — OFFICE VISIT (OUTPATIENT)
Dept: INTERNAL MEDICINE | Facility: CLINIC | Age: 55
End: 2020-10-30

## 2020-10-30 VITALS
SYSTOLIC BLOOD PRESSURE: 132 MMHG | BODY MASS INDEX: 26.22 KG/M2 | HEIGHT: 68 IN | DIASTOLIC BLOOD PRESSURE: 86 MMHG | WEIGHT: 173 LBS | HEART RATE: 92 BPM

## 2020-10-30 DIAGNOSIS — I10 ESSENTIAL HYPERTENSION: ICD-10-CM

## 2020-10-30 DIAGNOSIS — R10.11 RIGHT UPPER QUADRANT PAIN: Primary | ICD-10-CM

## 2020-10-30 PROCEDURE — 99214 OFFICE O/P EST MOD 30 MIN: CPT | Performed by: INTERNAL MEDICINE

## 2020-10-30 PROCEDURE — 71101 X-RAY EXAM UNILAT RIBS/CHEST: CPT | Performed by: INTERNAL MEDICINE

## 2020-10-30 NOTE — PROGRESS NOTES
"Chief Complaint   Patient presents with   • right upper quadrant pain   • Vomiting       History of Present Illness   Meenakshi Mae is a 55 y.o. female presents for acute care. Patient reports 1 month history of right side pain. Feels like a \"contraction\" \"sometimes it will just hit\". Denies any urinary symptoms. No history of kidney stones. Weight gain of 12 #. She is walking the dog routinely. She is drinking coffee w/ creamer. She is not really noting what she is eating. Not checking bp. Unrestricted diet.         The following portions of the patient's history were reviewed and updated as appropriate: allergies, current medications, past family history, past medical history, past social history, past surgical history and problem list.  Current Outpatient Medications on File Prior to Visit   Medication Sig Dispense Refill   • albuterol sulfate HFA (VENTOLIN HFA) 108 (90 Base) MCG/ACT inhaler Inhale 2 puffs Every 4 (Four) Hours As Needed for Wheezing. 18 g 4   • amLODIPine (NORVASC) 5 MG tablet TAKE ONE TABLET BY MOUTH DAILY 90 tablet 0   • cyclobenzaprine (FLEXERIL) 10 MG tablet TAKE 1 TABLET BY MOUTH TWICE A DAY AS NEEDED MUSCLE SPASM 30 tablet 4   • DEXILANT 60 MG capsule TAKE 1 CAPSULE BY MOUTH EVERY DAY 90 capsule 0   • diclofenac (VOLTAREN) 1 % gel gel APPLY 4 GRAMS TO AFFECTED AREA(S) FOUR TIMES A DAY 1 tube 4   • fluticasone (FLONASE) 50 MCG/ACT nasal spray PLACE TWO SPRAYS IN EACH NOSTRIL ONCE DAILY 16 each 3   • fluticasone (Flovent Diskus) 50 MCG/BLIST diskus inhaler Inhale 1 puff 2 (Two) Times a Day. 1 each 12   • LINZESS 145 MCG capsule TAKE ONE CAPSULE BY MOUTH DAILY 90 capsule 1   • meloxicam (MOBIC) 15 MG tablet Take 1 tablet by mouth Daily. 30 tablet 1   • montelukast (Singulair) 10 MG tablet Take 1 tablet by mouth Every Night. 90 tablet 1   • PARoxetine (Paxil) 40 MG tablet Take 1 tablet by mouth Every Morning. 90 tablet 1   • rosuvastatin (CRESTOR) 10 MG tablet TAKE ONE TABLET BY MOUTH DAILY " 90 tablet 4   • traMADol (ULTRAM) 50 MG tablet TAKE 1 TABLET BY MOUTH EVERY 6 HRS AS NEEDED 30 tablet 1   • traZODone (DESYREL) 50 MG tablet TAKE 2 TABLETS AT BEDTIME 60 tablet 2   • Triamcinolone Acetonide (NASACORT) 55 MCG/ACT nasal inhaler 2 sprays into the nostril(s) as directed by provider Daily. 16.5 g 11   • HYDROcodone-acetaminophen (NORCO) 5-325 MG per tablet Take 1 tablet by mouth Every 8 (Eight) Hours As Needed for Moderate Pain . 20 tablet 0     No current facility-administered medications on file prior to visit.      Review of Systems   Constitutional: Negative.    HENT: Negative.    Eyes: Negative.    Respiratory: Negative.    Cardiovascular: Negative.    Gastrointestinal:        Right upper quadrant pain     Endocrine: Negative.    Genitourinary: Negative.    Musculoskeletal: Negative.    Skin: Negative.    Allergic/Immunologic: Negative.    Neurological: Negative.    Hematological: Negative.    Psychiatric/Behavioral: Negative.        Objective   Physical Exam  Vitals signs and nursing note reviewed.   Constitutional:       Appearance: Normal appearance. She is well-developed.   HENT:      Head: Normocephalic and atraumatic.      Right Ear: Tympanic membrane and external ear normal.      Left Ear: Tympanic membrane and external ear normal.      Nose: Nose normal.      Mouth/Throat:      Mouth: Mucous membranes are dry.   Eyes:      Extraocular Movements: Extraocular movements intact.      Pupils: Pupils are equal, round, and reactive to light.   Neck:      Musculoskeletal: Normal range of motion and neck supple.   Cardiovascular:      Rate and Rhythm: Normal rate and regular rhythm.      Heart sounds: Normal heart sounds.   Pulmonary:      Effort: Pulmonary effort is normal. No respiratory distress.      Breath sounds: Normal breath sounds.   Abdominal:      General: Abdomen is flat.      Palpations: Abdomen is soft.      Comments: Non tender to deep palpation     Musculoskeletal: Normal range of  "motion.   Skin:     General: Skin is warm and dry.   Neurological:      General: No focal deficit present.      Mental Status: She is alert and oriented to person, place, and time.   Psychiatric:         Mood and Affect: Mood normal.         Behavior: Behavior normal.         Thought Content: Thought content normal.         Judgment: Judgment normal.          /86   Pulse 92   Ht 172.7 cm (68\")   Wt 78.5 kg (173 lb)   BMI 26.30 kg/m²     Assessment/Plan   Diagnoses and all orders for this visit:    Right upper quadrant pain  -     US Gallbladder; Future    Essential hypertension    patient w/ right upper quadrant pain. Given h/o breast ca checked rib films/ wnl. She will get a ruq u/s. She has recent weight gain. To continue healthy diet w/ routine fitness. Advised to track calories/ decrease carbs, fats, and sodium. F/u routinely.              "

## 2020-11-06 ENCOUNTER — HOSPITAL ENCOUNTER (OUTPATIENT)
Dept: ULTRASOUND IMAGING | Facility: HOSPITAL | Age: 55
Discharge: HOME | End: 2020-11-06
Admitting: INTERNAL MEDICINE

## 2020-11-06 DIAGNOSIS — R10.11 RIGHT UPPER QUADRANT PAIN: ICD-10-CM

## 2020-11-06 PROCEDURE — 76705 ECHO EXAM OF ABDOMEN: CPT

## 2020-12-04 RX ORDER — CYCLOBENZAPRINE HCL 10 MG
TABLET ORAL
Qty: 30 TABLET | Refills: 4 | Status: SHIPPED | OUTPATIENT
Start: 2020-12-04 | End: 2021-11-05

## 2020-12-16 DIAGNOSIS — Z00.00 HEALTHCARE MAINTENANCE: ICD-10-CM

## 2020-12-16 DIAGNOSIS — I10 ESSENTIAL HYPERTENSION: Primary | ICD-10-CM

## 2020-12-19 RX ORDER — MONTELUKAST SODIUM 10 MG/1
TABLET ORAL
Qty: 90 TABLET | Refills: 1 | Status: SHIPPED | OUTPATIENT
Start: 2020-12-19 | End: 2021-05-24 | Stop reason: SDUPTHER

## 2020-12-22 LAB
ALBUMIN SERPL-MCNC: 4.7 G/DL (ref 3.5–5.2)
ALBUMIN/GLOB SERPL: 2.4 G/DL
ALP SERPL-CCNC: 111 U/L (ref 39–117)
ALT SERPL-CCNC: 26 U/L (ref 1–33)
AST SERPL-CCNC: 23 U/L (ref 1–32)
BASOPHILS # BLD AUTO: 0.06 10*3/MM3 (ref 0–0.2)
BASOPHILS NFR BLD AUTO: 1 % (ref 0–1.5)
BILIRUB SERPL-MCNC: 0.4 MG/DL (ref 0–1.2)
BUN SERPL-MCNC: 10 MG/DL (ref 6–20)
BUN/CREAT SERPL: 14.1 (ref 7–25)
CALCIUM SERPL-MCNC: 10.2 MG/DL (ref 8.6–10.5)
CHLORIDE SERPL-SCNC: 103 MMOL/L (ref 98–107)
CHOLEST SERPL-MCNC: 133 MG/DL (ref 0–200)
CO2 SERPL-SCNC: 30.5 MMOL/L (ref 22–29)
CREAT SERPL-MCNC: 0.71 MG/DL (ref 0.57–1)
EOSINOPHIL # BLD AUTO: 0.1 10*3/MM3 (ref 0–0.4)
EOSINOPHIL NFR BLD AUTO: 1.7 % (ref 0.3–6.2)
ERYTHROCYTE [DISTWIDTH] IN BLOOD BY AUTOMATED COUNT: 13.5 % (ref 12.3–15.4)
GLOBULIN SER CALC-MCNC: 2 GM/DL
GLUCOSE SERPL-MCNC: 94 MG/DL (ref 65–99)
HCT VFR BLD AUTO: 39.1 % (ref 34–46.6)
HDLC SERPL-MCNC: 60 MG/DL (ref 40–60)
HGB BLD-MCNC: 12.7 G/DL (ref 12–15.9)
IMM GRANULOCYTES # BLD AUTO: 0.01 10*3/MM3 (ref 0–0.05)
IMM GRANULOCYTES NFR BLD AUTO: 0.2 % (ref 0–0.5)
LDLC SERPL CALC-MCNC: 54 MG/DL (ref 0–100)
LYMPHOCYTES # BLD AUTO: 2.18 10*3/MM3 (ref 0.7–3.1)
LYMPHOCYTES NFR BLD AUTO: 38.1 % (ref 19.6–45.3)
MCH RBC QN AUTO: 27.7 PG (ref 26.6–33)
MCHC RBC AUTO-ENTMCNC: 32.5 G/DL (ref 31.5–35.7)
MCV RBC AUTO: 85.2 FL (ref 79–97)
MONOCYTES # BLD AUTO: 0.47 10*3/MM3 (ref 0.1–0.9)
MONOCYTES NFR BLD AUTO: 8.2 % (ref 5–12)
NEUTROPHILS # BLD AUTO: 2.9 10*3/MM3 (ref 1.7–7)
NEUTROPHILS NFR BLD AUTO: 50.8 % (ref 42.7–76)
NRBC BLD AUTO-RTO: 0 /100 WBC (ref 0–0.2)
PLATELET # BLD AUTO: 288 10*3/MM3 (ref 140–450)
POTASSIUM SERPL-SCNC: 4.4 MMOL/L (ref 3.5–5.2)
PROT SERPL-MCNC: 6.7 G/DL (ref 6–8.5)
RBC # BLD AUTO: 4.59 10*6/MM3 (ref 3.77–5.28)
SODIUM SERPL-SCNC: 141 MMOL/L (ref 136–145)
TRIGL SERPL-MCNC: 105 MG/DL (ref 0–150)
VLDLC SERPL CALC-MCNC: 19 MG/DL (ref 5–40)
WBC # BLD AUTO: 5.72 10*3/MM3 (ref 3.4–10.8)

## 2020-12-22 RX ORDER — PAROXETINE HYDROCHLORIDE 40 MG/1
TABLET, FILM COATED ORAL
Qty: 90 TABLET | Refills: 1 | Status: SHIPPED | OUTPATIENT
Start: 2020-12-22 | End: 2021-09-01

## 2020-12-29 ENCOUNTER — OFFICE VISIT (OUTPATIENT)
Dept: INTERNAL MEDICINE | Facility: CLINIC | Age: 55
End: 2020-12-29

## 2020-12-29 VITALS
DIASTOLIC BLOOD PRESSURE: 76 MMHG | HEIGHT: 68 IN | HEART RATE: 99 BPM | SYSTOLIC BLOOD PRESSURE: 128 MMHG | BODY MASS INDEX: 25.91 KG/M2 | WEIGHT: 171 LBS

## 2020-12-29 DIAGNOSIS — F41.9 ANXIETY: ICD-10-CM

## 2020-12-29 DIAGNOSIS — I10 ESSENTIAL HYPERTENSION: Primary | ICD-10-CM

## 2020-12-29 PROCEDURE — 99214 OFFICE O/P EST MOD 30 MIN: CPT | Performed by: INTERNAL MEDICINE

## 2020-12-29 NOTE — PROGRESS NOTES
"Chief Complaint   Patient presents with   • Hypertension   • Anxiety       History of Present Illness   Meenakshi Mae is a 55 y.o. female presents for follow up evaluation. She in general is doing well today. She is worried about her mother who is struggling with memory and mood. Reports that \"anxiety is through roof\". She notes that anxious symptoms improve with fitness. Can wake w/ anxious symptoms. She is not following with a therapist as she could not find one on her insurance plan. She is taking paxil one tablet daily. When feeling \"panic\" she \"walks it out\". She can even wake feeling anxious. She is taking trazodone at night and unable sleep. Working on weight and she has lost 2 # from October. She has hypertension. Does not have a bp monitor and has not been testing.           The following portions of the patient's history were reviewed and updated as appropriate: allergies, current medications, past family history, past medical history, past social history, past surgical history and problem list.  Current Outpatient Medications on File Prior to Visit   Medication Sig Dispense Refill   • albuterol sulfate HFA (VENTOLIN HFA) 108 (90 Base) MCG/ACT inhaler Inhale 2 puffs Every 4 (Four) Hours As Needed for Wheezing. 18 g 4   • amLODIPine (NORVASC) 5 MG tablet TAKE ONE TABLET BY MOUTH DAILY 90 tablet 0   • cyclobenzaprine (FLEXERIL) 10 MG tablet TAKE 1 TABLET BY MOUTH TWICE A DAY AS NEEDED MUSCLE SPASMS 30 tablet 4   • DEXILANT 60 MG capsule TAKE 1 CAPSULE BY MOUTH EVERY DAY 90 capsule 0   • diclofenac (VOLTAREN) 1 % gel gel APPLY 4 GRAMS TO AFFECTED AREA(S) FOUR TIMES A DAY 1 tube 4   • fluticasone (FLONASE) 50 MCG/ACT nasal spray PLACE TWO SPRAYS IN EACH NOSTRIL ONCE DAILY 16 each 3   • fluticasone (Flovent Diskus) 50 MCG/BLIST diskus inhaler Inhale 1 puff 2 (Two) Times a Day. 1 each 12   • HYDROcodone-acetaminophen (NORCO) 5-325 MG per tablet Take 1 tablet by mouth Every 8 (Eight) Hours As Needed for " Moderate Pain . 20 tablet 0   • LINZESS 145 MCG capsule TAKE ONE CAPSULE BY MOUTH DAILY 90 capsule 1   • meloxicam (MOBIC) 15 MG tablet Take 1 tablet by mouth Daily. 30 tablet 1   • montelukast (SINGULAIR) 10 MG tablet TAKE 1 TABLET BY MOUTH EVERY DAY AT NIGHT 90 tablet 1   • PARoxetine (PAXIL) 40 MG tablet TAKE 1 TABLET BY MOUTH EVERY DAY IN THE MORNING 90 tablet 1   • rosuvastatin (CRESTOR) 10 MG tablet TAKE ONE TABLET BY MOUTH DAILY 90 tablet 4   • traZODone (DESYREL) 50 MG tablet TAKE 2 TABLETS AT BEDTIME 60 tablet 2   • Triamcinolone Acetonide (NASACORT) 55 MCG/ACT nasal inhaler 2 sprays into the nostril(s) as directed by provider Daily. 16.5 g 11   • [DISCONTINUED] traMADol (ULTRAM) 50 MG tablet TAKE 1 TABLET BY MOUTH EVERY 6 HRS AS NEEDED 30 tablet 1     No current facility-administered medications on file prior to visit.      Review of Systems   Constitutional: Negative.    HENT: Negative.    Eyes: Negative.    Respiratory: Negative.    Cardiovascular: Negative.  Negative for chest pain.   Gastrointestinal: Negative.  Negative for abdominal pain.   Endocrine: Negative.    Genitourinary: Negative.    Musculoskeletal: Positive for back pain and neck pain.   Skin: Negative.    Neurological: Negative for dizziness and headaches.   Hematological: Negative.    Psychiatric/Behavioral: Negative.        Objective   Physical Exam  Vitals signs and nursing note reviewed.   Constitutional:       Appearance: Normal appearance.   HENT:      Head: Normocephalic and atraumatic.      Right Ear: Tympanic membrane normal.      Left Ear: Tympanic membrane normal.      Nose: Nose normal.      Mouth/Throat:      Mouth: Mucous membranes are moist.   Eyes:      Extraocular Movements: Extraocular movements intact.      Pupils: Pupils are equal, round, and reactive to light.   Neck:      Musculoskeletal: Normal range of motion.   Cardiovascular:      Rate and Rhythm: Normal rate and regular rhythm.      Pulses: Normal pulses.  "  Pulmonary:      Effort: Pulmonary effort is normal.      Breath sounds: Normal breath sounds.   Abdominal:      General: Abdomen is flat.      Palpations: Abdomen is soft.   Musculoskeletal: Normal range of motion.   Skin:     General: Skin is warm.   Neurological:      General: No focal deficit present.      Mental Status: She is alert and oriented to person, place, and time.   Psychiatric:         Mood and Affect: Mood normal.         Behavior: Behavior normal.         Thought Content: Thought content normal.         Judgment: Judgment normal.          /76   Pulse 99   Ht 172.7 cm (68\")   Wt 77.6 kg (171 lb)   BMI 26.00 kg/m²     Assessment/Plan   Diagnoses and all orders for this visit:    Essential hypertension    Anxiety    patient w/ anxiety. She is taking paxil and trazodone. Would like to maximize cognitive behavioral therapy and again encourage to schedule w a therapist. She is to start meditation every evening. She is to avoid caffeine and sugar. Especially in the evening. She is to increase physical activity. Follow up in 6 months or prn.              Answers for HPI/ROS submitted by the patient on 12/28/2020   Neurologic complaint  What is the primary reason for your visit?: Neurological Problem    "

## 2021-01-04 RX ORDER — ROSUVASTATIN CALCIUM 10 MG/1
10 TABLET, COATED ORAL DAILY
Qty: 90 TABLET | Refills: 3 | Status: SHIPPED | OUTPATIENT
Start: 2021-01-04 | End: 2022-07-19 | Stop reason: DRUGHIGH

## 2021-01-04 RX ORDER — ROSUVASTATIN CALCIUM 10 MG/1
TABLET, COATED ORAL
Qty: 90 TABLET | Refills: 3 | Status: SHIPPED | OUTPATIENT
Start: 2021-01-04 | End: 2021-01-04 | Stop reason: SDUPTHER

## 2021-01-04 NOTE — TELEPHONE ENCOUNTER
Provider:XIMENA WATSON    Caller: AZEEM CARY    Relationship to Patient: SELF    Pharmacy: [facility]/pharmacy #[id number] - [place] [address] - [phone] [facility] [phone]   [phone]    Phone Number: [phone] (h     Reason for Call: PATIENT STATES BLOOD PRESSURE HAS BEEN GOING UP AND DOWN 133/93 /90 PATIENT WANTING TO KNOW IF SHE CAN INCREASE DOES OF amLODIPine (NORVASC) 5 MG tablet        PATIENT IS REQUESTING CALLBACK

## 2021-02-03 ENCOUNTER — TELEPHONE (OUTPATIENT)
Dept: ONCOLOGY | Facility: CLINIC | Age: 56
End: 2021-02-03

## 2021-02-03 NOTE — TELEPHONE ENCOUNTER
Caller: Meenakshi    Relationship to patient: Pt    Best call back number: [phone]    Type of visit: Lab and follow up     Requested date: 02/15 around 8am    Additional notes: Pt's mother is scheduled for labs and to see Dr code on 02/15 @ 8:10am. Meenaskhi has asked for approval to go with pt to her appts and is also requesting to be scheduled at the same time as her mother

## 2021-02-12 DIAGNOSIS — Z17.1 MALIGNANT NEOPLASM OF UPPER-OUTER QUADRANT OF RIGHT BREAST IN FEMALE, ESTROGEN RECEPTOR NEGATIVE (HCC): Primary | ICD-10-CM

## 2021-02-12 DIAGNOSIS — C50.411 MALIGNANT NEOPLASM OF UPPER-OUTER QUADRANT OF RIGHT BREAST IN FEMALE, ESTROGEN RECEPTOR NEGATIVE (HCC): Primary | ICD-10-CM

## 2021-02-22 ENCOUNTER — OFFICE VISIT (OUTPATIENT)
Dept: ONCOLOGY | Facility: CLINIC | Age: 56
End: 2021-02-22

## 2021-02-22 ENCOUNTER — LAB (OUTPATIENT)
Dept: LAB | Facility: HOSPITAL | Age: 56
End: 2021-02-22

## 2021-02-22 VITALS
WEIGHT: 168.4 LBS | HEART RATE: 98 BPM | TEMPERATURE: 97.1 F | BODY MASS INDEX: 27.06 KG/M2 | SYSTOLIC BLOOD PRESSURE: 137 MMHG | RESPIRATION RATE: 16 BRPM | HEIGHT: 66 IN | DIASTOLIC BLOOD PRESSURE: 82 MMHG | OXYGEN SATURATION: 98 %

## 2021-02-22 DIAGNOSIS — C50.411 MALIGNANT NEOPLASM OF UPPER-OUTER QUADRANT OF RIGHT BREAST IN FEMALE, ESTROGEN RECEPTOR NEGATIVE (HCC): ICD-10-CM

## 2021-02-22 DIAGNOSIS — Z17.1 MALIGNANT NEOPLASM OF UPPER-OUTER QUADRANT OF RIGHT BREAST IN FEMALE, ESTROGEN RECEPTOR NEGATIVE (HCC): ICD-10-CM

## 2021-02-22 DIAGNOSIS — Z17.1 MALIGNANT NEOPLASM OF UPPER-OUTER QUADRANT OF RIGHT BREAST IN FEMALE, ESTROGEN RECEPTOR NEGATIVE (HCC): Primary | ICD-10-CM

## 2021-02-22 DIAGNOSIS — C50.411 MALIGNANT NEOPLASM OF UPPER-OUTER QUADRANT OF RIGHT BREAST IN FEMALE, ESTROGEN RECEPTOR NEGATIVE (HCC): Primary | ICD-10-CM

## 2021-02-22 LAB
BASOPHILS # BLD AUTO: 0.06 10*3/MM3 (ref 0–0.2)
BASOPHILS NFR BLD AUTO: 0.9 % (ref 0–1.5)
DEPRECATED RDW RBC AUTO: 42.9 FL (ref 37–54)
EOSINOPHIL # BLD AUTO: 0.12 10*3/MM3 (ref 0–0.4)
EOSINOPHIL NFR BLD AUTO: 1.8 % (ref 0.3–6.2)
ERYTHROCYTE [DISTWIDTH] IN BLOOD BY AUTOMATED COUNT: 13.8 % (ref 12.3–15.4)
HCT VFR BLD AUTO: 38.3 % (ref 34–46.6)
HGB BLD-MCNC: 12.6 G/DL (ref 12–15.9)
IMM GRANULOCYTES # BLD AUTO: 0.04 10*3/MM3 (ref 0–0.05)
IMM GRANULOCYTES NFR BLD AUTO: 0.6 % (ref 0–0.5)
LYMPHOCYTES # BLD AUTO: 2.86 10*3/MM3 (ref 0.7–3.1)
LYMPHOCYTES NFR BLD AUTO: 41.8 % (ref 19.6–45.3)
MCH RBC QN AUTO: 27.9 PG (ref 26.6–33)
MCHC RBC AUTO-ENTMCNC: 32.9 G/DL (ref 31.5–35.7)
MCV RBC AUTO: 84.7 FL (ref 79–97)
MONOCYTES # BLD AUTO: 0.58 10*3/MM3 (ref 0.1–0.9)
MONOCYTES NFR BLD AUTO: 8.5 % (ref 5–12)
NEUTROPHILS NFR BLD AUTO: 3.18 10*3/MM3 (ref 1.7–7)
NEUTROPHILS NFR BLD AUTO: 46.4 % (ref 42.7–76)
NRBC BLD AUTO-RTO: 0 /100 WBC (ref 0–0.2)
PLATELET # BLD AUTO: 265 10*3/MM3 (ref 140–450)
PMV BLD AUTO: 10.1 FL (ref 6–12)
RBC # BLD AUTO: 4.52 10*6/MM3 (ref 3.77–5.28)
WBC # BLD AUTO: 6.84 10*3/MM3 (ref 3.4–10.8)

## 2021-02-22 PROCEDURE — 85025 COMPLETE CBC W/AUTO DIFF WBC: CPT

## 2021-02-22 PROCEDURE — 99213 OFFICE O/P EST LOW 20 MIN: CPT | Performed by: INTERNAL MEDICINE

## 2021-02-22 PROCEDURE — 36415 COLL VENOUS BLD VENIPUNCTURE: CPT

## 2021-02-22 NOTE — PROGRESS NOTES
Subjective .     REASONS FOR FOLLOWUP:  Breast cancer    HISTORY OF PRESENT ILLNESS:  The patient is a 55 y.o. year old female  who is here for follow-up with the above-mentioned history.    Her follow-up visit was delayed due to the COVID-19 pandemic.  She has been doing fine.  Denies unusual areas of pain, shortness of breath, or neurological symptoms.  Weight has been fluctuating.  She is trying to lose weight.    No change in chronic nausea        Past Medical History:   Diagnosis Date   • Anemia    • Depression    • H/O transfusion of packed red blood cells    • Headache    • History of low back pain    • Malignant neoplasm of breast (CMS/HCC) 2013    Right, T2N1M0, Stage IIB       HEMATOLOGIC/ONCOLOGIC HISTORY:  (History from previous dates can be found in the separate document.)    MEDICATIONS    Current Outpatient Medications:   •  albuterol sulfate HFA (VENTOLIN HFA) 108 (90 Base) MCG/ACT inhaler, Inhale 2 puffs Every 4 (Four) Hours As Needed for Wheezing., Disp: 18 g, Rfl: 4  •  amLODIPine (NORVASC) 5 MG tablet, TAKE ONE TABLET BY MOUTH DAILY, Disp: 90 tablet, Rfl: 0  •  cyclobenzaprine (FLEXERIL) 10 MG tablet, TAKE 1 TABLET BY MOUTH TWICE A DAY AS NEEDED MUSCLE SPASMS, Disp: 30 tablet, Rfl: 4  •  DEXILANT 60 MG capsule, TAKE 1 CAPSULE BY MOUTH EVERY DAY, Disp: 90 capsule, Rfl: 0  •  diclofenac (VOLTAREN) 1 % gel gel, APPLY 4 GRAMS TO AFFECTED AREA(S) FOUR TIMES A DAY, Disp: 1 tube, Rfl: 4  •  fluticasone (FLONASE) 50 MCG/ACT nasal spray, PLACE TWO SPRAYS IN EACH NOSTRIL ONCE DAILY, Disp: 16 each, Rfl: 3  •  fluticasone (Flovent Diskus) 50 MCG/BLIST diskus inhaler, Inhale 1 puff 2 (Two) Times a Day., Disp: 1 each, Rfl: 12  •  HYDROcodone-acetaminophen (NORCO) 5-325 MG per tablet, Take 1 tablet by mouth Every 8 (Eight) Hours As Needed for Moderate Pain ., Disp: 20 tablet, Rfl: 0  •  meloxicam (MOBIC) 15 MG tablet, Take 1 tablet by mouth Daily., Disp: 30 tablet, Rfl: 1  •  montelukast (SINGULAIR) 10 MG  tablet, TAKE 1 TABLET BY MOUTH EVERY DAY AT NIGHT, Disp: 90 tablet, Rfl: 1  •  PARoxetine (PAXIL) 40 MG tablet, TAKE 1 TABLET BY MOUTH EVERY DAY IN THE MORNING, Disp: 90 tablet, Rfl: 1  •  rosuvastatin (CRESTOR) 10 MG tablet, Take 1 tablet by mouth Daily., Disp: 90 tablet, Rfl: 3  •  traZODone (DESYREL) 50 MG tablet, TAKE 2 TABLETS AT BEDTIME, Disp: 60 tablet, Rfl: 2  •  Triamcinolone Acetonide (NASACORT) 55 MCG/ACT nasal inhaler, 2 sprays into the nostril(s) as directed by provider Daily., Disp: 16.5 g, Rfl: 11    ALLERGIES:     Allergies   Allergen Reactions   • Erythromycin Hives       SOCIAL HISTORY:       Social History     Socioeconomic History   • Marital status:      Spouse name: Not on file   • Number of children: Not on file   • Years of education: College   • Highest education level: Not on file   Occupational History   • Occupation: Danger     Employer: [employer]   Tobacco Use   • Smoking status: Former Smoker     Packs/day: 1.00     Years: 6.00     Pack years: 6.00     Types: Cigarettes   • Smokeless tobacco: Former User   • Tobacco comment: Smoked from age [age].   Substance and Sexual Activity   • Alcohol use: Yes     Comment: Occasional   • Drug use: No         FAMILY HISTORY:  Family History   Problem Relation Age of Onset   • Heart disease Mother    • Hypertension Mother    • Stomach cancer Father    • Ovarian cancer Maternal Aunt    • Stomach cancer Maternal Aunt        REVIEW OF SYSTEMS:  Review of Systems   Constitutional: Negative for activity change.   HENT: Negative for nosebleeds and trouble swallowing.    Respiratory: Negative for shortness of breath and wheezing.    Cardiovascular: Negative for chest pain and palpitations.   Gastrointestinal: Positive for nausea. Negative for constipation and diarrhea.   Genitourinary: Negative for dysuria and hematuria.   Musculoskeletal: Negative for arthralgias and myalgias.   Skin: Negative for rash and wound.   Neurological:  "Negative for seizures and syncope.   Hematological: Does not bruise/bleed easily.   Psychiatric/Behavioral: Negative for confusion.          Objective    Vitals:    02/22/21 0817   BP: 137/82   Pulse: 98   Resp: 16   Temp: 97.1 °F (36.2 °C)   SpO2: 98%   Weight: 76.4 kg (168 lb 6.4 oz)   Height: 168 cm (66.14\")  Comment: new ht.   PainSc: 0-No pain     Current Status 2/22/2021   ECOG score 0      PHYSICAL EXAM:        CONSTITUTIONAL:  Vital signs reviewed.  No distress, looks comfortable.  EYES:  Conjunctiva and lids unremarkable.  PERRLA  EARS,NOSE,MOUTH,THROAT:  Ears and nose appear unremarkable.  Lips, teeth, gums appear unremarkable.  RESPIRATORY:  Normal respiratory effort.  Lungs clear to auscultation bilaterally.  CARDIOVASCULAR:  Normal S1, S2.  No murmurs rubs or gallops.  No significant lower extremity edema.  GASTROINTESTINAL: Abdomen appears unremarkable.  Nontender.  No hepatomegaly.  No splenomegaly.  LYMPHATIC:  No cervical, supraclavicular, axillary lymphadenopathy.  SKIN:  Warm.  No rashes.  PSYCHIATRIC:  Normal judgment and insight.  Normal mood and affect.        RECENT LABS:        WBC   Date/Time Value Ref Range Status   02/22/2021 08:04 AM 6.84 3.40 - 10.80 10*3/mm3 Final   12/22/2020 09:42 AM 5.72 3.40 - 10.80 10*3/mm3 Final     Hemoglobin   Date/Time Value Ref Range Status   02/22/2021 08:04 AM 12.6 12.0 - 15.9 g/dL Final     Platelets   Date/Time Value Ref Range Status   02/22/2021 08:04  140 - 450 10*3/mm3 Final       Assessment/Plan     ASSESSMENT:  *T2N1M0 (stage IIB) invasive ductal carcinoma of the right breast. Esther score 9 of 9 (high grade), triple-negative, 3.3 cm tumor. Right mastectomy and TRAM flap reconstruction. Completed dose-dense Adriamycin/Cytoxan with dose-dense Taxol 07/11/2013. Completed radiation October 2013.   · I reminded her Again today we would be quite unusual for her cancer to recur this late.  There is a good probability she is cured of this cancer.  "   · Although she is having pain in her chest with bending over, I doubt this is related to her history of cancer.  Statistically, she is likely cured of this cancer.  However, discomfort is been getting worse over the past for 5 months.  I do think it is worth checking a CT of the chest to look for malignancy as the cause of this discomfort.  She has already not responded to a trial of steroids.  · We discussed first trying a trial of Aleve 2 tablets twice per day for 1 week.  If this does not help, she will maintain the plan for CT of the chest in a few weeks.  She knows to stay well-hydrated while using the Aleve.  She knows Aleve can contribute to peptic ulcer disease and renal dysfunction.  · CT chest 5/14/2019 (done due to chest discomfort with bending over): No evidence of malignancy.  No clinical signs of recurrence.  Remains in remission.    *Previously complained of chest discomfort upon bending over since around December 2018 or so.  I suspect this is related to her weight gain.  No signs of malignancy as the reason for the discomfort.    *Nausea.  IBS.  Managed by both Dr. Gallegos and Dr. Vandana Zavaleta.  No change in chronic nausea    *. Intermittent blurry vision, left eye more so than right eye.  She has seen her ophthalmologist for this.  She states nothing concerning was found.  She follows with Dr. Kiran Cabrera of neurology for this.  A prior MRI read as possible optic neuritis.  She states the follow-up MRI was fine.  She did not complain of this today.    *Overweight  Being overweight is a risk factor for breast cancer.  Ideally, she should lose weight.  Body mass index is 27.06 kg/m².  BMI 25 to <30 is overweight  BMI 30 to <35 is class 1 obesity  BMI 35 to <40 is class 2 obesity  BMI 40 or higher is class 3 obesity   Weight 168 pounds on 2/22/2021  She states Dr. Zavaleta asked her to do a 1200-calorie diet.  She states this is going relatively well.  I encouraged her to keep this up.    * 2 liver lesions  on 1/31/13 staging CAT scan.  Liver protocol CT subsequently read as benign liver lesions.  Liver lesions unchanged on CT 5/4/16.    * She has an annual  for breast cancer.      * Chronic sciatica and cervical pain.  Treatment through her PCP, Dr. Vandana Zavaleta.  As had epidurals.  Denies pain today.    *Previously complained of left preauricular minimally enlarged node.  Was following with Dr. Tin Ordoñez of ENT.  The node did not feel malignant my exam.  Dr. Ordoñez, last record I have for review is 4/6/18.      Plan  · MD CBC 1 year (because she is over 5 years out from triple negative breast cancer, this is likely cured)  · Her port has been removed.   · Last mammogram, 6/3/2020, BI-RADS 1

## 2021-03-09 RX ORDER — ALBUTEROL SULFATE 90 UG/1
AEROSOL, METERED RESPIRATORY (INHALATION)
Qty: 18 G | Refills: 4 | Status: SHIPPED | OUTPATIENT
Start: 2021-03-09 | End: 2022-03-18

## 2021-03-10 RX ORDER — AMLODIPINE BESYLATE 5 MG/1
5 TABLET ORAL DAILY
Qty: 90 TABLET | Refills: 3 | Status: SHIPPED | OUTPATIENT
Start: 2021-03-10 | End: 2022-03-18

## 2021-03-24 ENCOUNTER — BULK ORDERING (OUTPATIENT)
Dept: CASE MANAGEMENT | Facility: OTHER | Age: 56
End: 2021-03-24

## 2021-03-24 DIAGNOSIS — Z23 IMMUNIZATION DUE: ICD-10-CM

## 2021-04-19 ENCOUNTER — OFFICE VISIT (OUTPATIENT)
Dept: INTERNAL MEDICINE | Facility: CLINIC | Age: 56
End: 2021-04-19

## 2021-04-19 VITALS
BODY MASS INDEX: 26.68 KG/M2 | HEART RATE: 86 BPM | HEIGHT: 66 IN | SYSTOLIC BLOOD PRESSURE: 132 MMHG | DIASTOLIC BLOOD PRESSURE: 78 MMHG | WEIGHT: 166 LBS

## 2021-04-19 DIAGNOSIS — S99.921A INJURY OF TOE ON RIGHT FOOT, INITIAL ENCOUNTER: ICD-10-CM

## 2021-04-19 DIAGNOSIS — M85.88 OSTEOPENIA OF LUMBAR SPINE: ICD-10-CM

## 2021-04-19 DIAGNOSIS — Z78.0 MENOPAUSE: Primary | ICD-10-CM

## 2021-04-19 PROCEDURE — 73630 X-RAY EXAM OF FOOT: CPT | Performed by: INTERNAL MEDICINE

## 2021-04-19 PROCEDURE — 99214 OFFICE O/P EST MOD 30 MIN: CPT | Performed by: INTERNAL MEDICINE

## 2021-04-19 RX ORDER — TRIAMCINOLONE ACETONIDE 55 UG/1
2 SPRAY, METERED NASAL DAILY
Qty: 16.5 G | Refills: 11 | Status: SHIPPED | OUTPATIENT
Start: 2021-04-19 | End: 2022-04-19

## 2021-04-19 RX ORDER — TRAZODONE HYDROCHLORIDE 50 MG/1
TABLET ORAL
Qty: 180 TABLET | Refills: 3 | Status: SHIPPED | OUTPATIENT
Start: 2021-04-19 | End: 2021-10-26 | Stop reason: ALTCHOICE

## 2021-04-19 NOTE — PROGRESS NOTES
Chief Complaint   Patient presents with   • Toe Injury     broke over 2 months ago       History of Present Illness   Meenakshi Mae is a 55 y.o. female presents for acute care. Reports that she injured her 5th toe right foot about 2 months ago. Struck toe on a chair at that time. She has had continued pain and swelling despite buddy taping/ conservative management for 1 month. Now less but persistent pain w/ walking. Patient has spinal osteopenia. Most recent dexa 2017.       The following portions of the patient's history were reviewed and updated as appropriate: allergies, current medications, past family history, past medical history, past social history, past surgical history and problem list.  Current Outpatient Medications on File Prior to Visit   Medication Sig Dispense Refill   • albuterol sulfate  (90 Base) MCG/ACT inhaler TAKE 2 PUFFS BY MOUTH EVERY 4 HOURS AS NEEDED FOR WHEEZE 18 g 4   • amLODIPine (NORVASC) 5 MG tablet Take 1 tablet by mouth Daily. 90 tablet 3   • cyclobenzaprine (FLEXERIL) 10 MG tablet TAKE 1 TABLET BY MOUTH TWICE A DAY AS NEEDED MUSCLE SPASMS 30 tablet 4   • DEXILANT 60 MG capsule TAKE 1 CAPSULE BY MOUTH EVERY DAY 90 capsule 0   • diclofenac (VOLTAREN) 1 % gel gel APPLY 4 GRAMS TO AFFECTED AREA(S) FOUR TIMES A DAY 1 tube 4   • fluticasone (Flovent Diskus) 50 MCG/BLIST diskus inhaler Inhale 1 puff 2 (Two) Times a Day. 1 each 12   • montelukast (SINGULAIR) 10 MG tablet TAKE 1 TABLET BY MOUTH EVERY DAY AT NIGHT 90 tablet 1   • PARoxetine (PAXIL) 40 MG tablet TAKE 1 TABLET BY MOUTH EVERY DAY IN THE MORNING 90 tablet 1   • rosuvastatin (CRESTOR) 10 MG tablet Take 1 tablet by mouth Daily. 90 tablet 3   • traZODone (DESYREL) 50 MG tablet TAKE 2 TABLETS AT BEDTIME 60 tablet 2   • Triamcinolone Acetonide (NASACORT) 55 MCG/ACT nasal inhaler 2 sprays into the nostril(s) as directed by provider Daily. 16.5 g 11   • [DISCONTINUED] fluticasone (FLONASE) 50 MCG/ACT nasal spray PLACE TWO  SPRAYS IN EACH NOSTRIL ONCE DAILY 16 each 3   • [DISCONTINUED] HYDROcodone-acetaminophen (NORCO) 5-325 MG per tablet Take 1 tablet by mouth Every 8 (Eight) Hours As Needed for Moderate Pain . 20 tablet 0   • [DISCONTINUED] meloxicam (MOBIC) 15 MG tablet Take 1 tablet by mouth Daily. 30 tablet 1     No current facility-administered medications on file prior to visit.     Review of Systems   Constitutional: Negative.    HENT: Negative.    Eyes: Negative.    Respiratory: Negative.    Cardiovascular: Negative.    Gastrointestinal: Negative.    Endocrine: Negative.    Genitourinary: Negative.    Musculoskeletal:        Right foot 5th digit pain     Skin: Negative.    Allergic/Immunologic: Negative.    Neurological: Negative.    Hematological: Negative.    Psychiatric/Behavioral: Negative.        Objective   Physical Exam  Vitals and nursing note reviewed.   Constitutional:       Appearance: Normal appearance.   HENT:      Head: Normocephalic and atraumatic.      Right Ear: Tympanic membrane normal.      Left Ear: Tympanic membrane normal.      Nose: Nose normal.      Mouth/Throat:      Mouth: Mucous membranes are moist.   Eyes:      Extraocular Movements: Extraocular movements intact.      Pupils: Pupils are equal, round, and reactive to light.   Cardiovascular:      Rate and Rhythm: Normal rate and regular rhythm.      Pulses: Normal pulses.      Heart sounds: Normal heart sounds.   Pulmonary:      Effort: Pulmonary effort is normal.      Breath sounds: Normal breath sounds.   Abdominal:      General: Abdomen is flat.      Palpations: Abdomen is soft.   Genitourinary:     General: Normal vulva.      Rectum: Normal.   Musculoskeletal:      Cervical back: Normal range of motion.      Comments: Right foot 5th digit swollen and tender   Skin:     General: Skin is warm and dry.   Neurological:      General: No focal deficit present.      Mental Status: She is alert and oriented to person, place, and time.   Psychiatric:     "     Mood and Affect: Mood normal.         Behavior: Behavior normal.         Thought Content: Thought content normal.         Judgment: Judgment normal.        XR for continued toe pain 5th digit. 5th toe fracture mid shaft. Nondisplaced. No prior.     /78   Pulse 86   Ht 167.6 cm (66\")   Wt 75.3 kg (166 lb)   BMI 26.79 kg/m²     Assessment/Plan   Diagnoses and all orders for this visit:    Menopause  -     DEXA Bone Density Axial    Injury of toe on right foot, initial encounter  -     XR Foot 3+ View Right (In Office)  -     DEXA Bone Density Axial    Osteopenia of lumbar spine  -     DEXA Bone Density Axial      Patient w/ 5th digit fracture. Nondisplaced. She will use a firm supportive footwear. Luis tape to 4th digit. If worsens or fails to improve then she will see ortho. She will get a DEXA to assess bone density.            "

## 2021-05-13 ENCOUNTER — TRANSCRIBE ORDERS (OUTPATIENT)
Dept: ADMINISTRATIVE | Facility: HOSPITAL | Age: 56
End: 2021-05-13

## 2021-05-13 ENCOUNTER — HOSPITAL ENCOUNTER (OUTPATIENT)
Dept: CARDIOLOGY | Facility: HOSPITAL | Age: 56
Discharge: HOME | End: 2021-05-13
Admitting: ORTHOPAEDIC SURGERY

## 2021-05-13 DIAGNOSIS — I10 ESSENTIAL HYPERTENSION, MALIGNANT: ICD-10-CM

## 2021-05-13 DIAGNOSIS — I10 ESSENTIAL HYPERTENSION, MALIGNANT: Primary | ICD-10-CM

## 2021-05-13 LAB — QT INTERVAL: 373 MS

## 2021-05-13 PROCEDURE — 93010 ELECTROCARDIOGRAM REPORT: CPT | Performed by: INTERNAL MEDICINE

## 2021-05-13 PROCEDURE — 93005 ELECTROCARDIOGRAM TRACING: CPT | Performed by: ORTHOPAEDIC SURGERY

## 2021-05-24 RX ORDER — MONTELUKAST SODIUM 10 MG/1
10 TABLET ORAL
Qty: 90 TABLET | Refills: 3 | Status: SHIPPED | OUTPATIENT
Start: 2021-05-24 | End: 2022-05-27 | Stop reason: SDUPTHER

## 2021-06-08 ENCOUNTER — TRANSCRIBE ORDERS (OUTPATIENT)
Dept: ADMINISTRATIVE | Facility: HOSPITAL | Age: 56
End: 2021-06-08

## 2021-06-08 DIAGNOSIS — Z12.31 VISIT FOR SCREENING MAMMOGRAM: Primary | ICD-10-CM

## 2021-06-17 DIAGNOSIS — I10 ESSENTIAL HYPERTENSION: Primary | ICD-10-CM

## 2021-06-17 DIAGNOSIS — Z00.00 HEALTHCARE MAINTENANCE: ICD-10-CM

## 2021-06-21 ENCOUNTER — HOSPITAL ENCOUNTER (OUTPATIENT)
Dept: MAMMOGRAPHY | Facility: HOSPITAL | Age: 56
Discharge: HOME | End: 2021-06-21
Admitting: INTERNAL MEDICINE

## 2021-06-21 DIAGNOSIS — Z12.31 VISIT FOR SCREENING MAMMOGRAM: ICD-10-CM

## 2021-06-21 PROCEDURE — 77067 SCR MAMMO BI INCL CAD: CPT

## 2021-06-21 PROCEDURE — 77063 BREAST TOMOSYNTHESIS BI: CPT

## 2021-06-22 LAB
ALBUMIN SERPL-MCNC: 4.7 G/DL (ref 3.5–5.2)
ALBUMIN/GLOB SERPL: 2.4 G/DL
ALP SERPL-CCNC: 121 U/L (ref 39–117)
ALT SERPL-CCNC: 22 U/L (ref 1–33)
APPEARANCE UR: CLEAR
AST SERPL-CCNC: 5 U/L (ref 1–32)
BACTERIA #/AREA URNS HPF: ABNORMAL /HPF
BASOPHILS # BLD AUTO: 0.06 10*3/MM3 (ref 0–0.2)
BASOPHILS NFR BLD AUTO: 0.9 % (ref 0–1.5)
BILIRUB SERPL-MCNC: 0.2 MG/DL (ref 0–1.2)
BILIRUB UR QL STRIP: NEGATIVE
BUN SERPL-MCNC: 12 MG/DL (ref 6–20)
BUN/CREAT SERPL: 17.9 (ref 7–25)
CALCIUM SERPL-MCNC: 9.5 MG/DL (ref 8.6–10.5)
CASTS URNS MICRO: ABNORMAL
CHLORIDE SERPL-SCNC: 104 MMOL/L (ref 98–107)
CHOLEST SERPL-MCNC: 109 MG/DL (ref 0–200)
CO2 SERPL-SCNC: 29.2 MMOL/L (ref 22–29)
COLOR UR: YELLOW
CREAT SERPL-MCNC: 0.67 MG/DL (ref 0.57–1)
EOSINOPHIL # BLD AUTO: 0.16 10*3/MM3 (ref 0–0.4)
EOSINOPHIL NFR BLD AUTO: 2.3 % (ref 0.3–6.2)
EPI CELLS #/AREA URNS HPF: ABNORMAL /HPF
ERYTHROCYTE [DISTWIDTH] IN BLOOD BY AUTOMATED COUNT: 13.2 % (ref 12.3–15.4)
GLOBULIN SER CALC-MCNC: 2 GM/DL
GLUCOSE SERPL-MCNC: 110 MG/DL (ref 65–99)
GLUCOSE UR QL: NEGATIVE
HCT VFR BLD AUTO: 38.8 % (ref 34–46.6)
HDLC SERPL-MCNC: 58 MG/DL (ref 40–60)
HGB BLD-MCNC: 12.4 G/DL (ref 12–15.9)
HGB UR QL STRIP: ABNORMAL
IMM GRANULOCYTES # BLD AUTO: 0.01 10*3/MM3 (ref 0–0.05)
IMM GRANULOCYTES NFR BLD AUTO: 0.1 % (ref 0–0.5)
KETONES UR QL STRIP: NEGATIVE
LDLC SERPL CALC-MCNC: 37 MG/DL (ref 0–100)
LEUKOCYTE ESTERASE UR QL STRIP: NEGATIVE
LYMPHOCYTES # BLD AUTO: 2.76 10*3/MM3 (ref 0.7–3.1)
LYMPHOCYTES NFR BLD AUTO: 40.1 % (ref 19.6–45.3)
MCH RBC QN AUTO: 27.6 PG (ref 26.6–33)
MCHC RBC AUTO-ENTMCNC: 32 G/DL (ref 31.5–35.7)
MCV RBC AUTO: 86.4 FL (ref 79–97)
MONOCYTES # BLD AUTO: 0.46 10*3/MM3 (ref 0.1–0.9)
MONOCYTES NFR BLD AUTO: 6.7 % (ref 5–12)
NEUTROPHILS # BLD AUTO: 3.44 10*3/MM3 (ref 1.7–7)
NEUTROPHILS NFR BLD AUTO: 49.9 % (ref 42.7–76)
NITRITE UR QL STRIP: NEGATIVE
NRBC BLD AUTO-RTO: 0 /100 WBC (ref 0–0.2)
PH UR STRIP: 6 [PH] (ref 5–8)
PLATELET # BLD AUTO: 293 10*3/MM3 (ref 140–450)
POTASSIUM SERPL-SCNC: 4.9 MMOL/L (ref 3.5–5.2)
PROT SERPL-MCNC: 6.7 G/DL (ref 6–8.5)
PROT UR QL STRIP: NEGATIVE
RBC # BLD AUTO: 4.49 10*6/MM3 (ref 3.77–5.28)
RBC #/AREA URNS HPF: ABNORMAL /HPF
SODIUM SERPL-SCNC: 142 MMOL/L (ref 136–145)
SP GR UR: 1.02 (ref 1–1.03)
TRIGL SERPL-MCNC: 66 MG/DL (ref 0–150)
TSH SERPL DL<=0.005 MIU/L-ACNC: 0.79 UIU/ML (ref 0.27–4.2)
UROBILINOGEN UR STRIP-MCNC: ABNORMAL MG/DL
VLDLC SERPL CALC-MCNC: 14 MG/DL (ref 5–40)
WBC # BLD AUTO: 6.89 10*3/MM3 (ref 3.4–10.8)
WBC #/AREA URNS HPF: ABNORMAL /HPF

## 2021-06-29 ENCOUNTER — OFFICE VISIT (OUTPATIENT)
Dept: INTERNAL MEDICINE | Facility: CLINIC | Age: 56
End: 2021-06-29

## 2021-06-29 VITALS
HEIGHT: 66 IN | WEIGHT: 166 LBS | HEART RATE: 90 BPM | BODY MASS INDEX: 26.68 KG/M2 | DIASTOLIC BLOOD PRESSURE: 88 MMHG | SYSTOLIC BLOOD PRESSURE: 156 MMHG

## 2021-06-29 DIAGNOSIS — Z17.1 MALIGNANT NEOPLASM OF UPPER-OUTER QUADRANT OF RIGHT BREAST IN FEMALE, ESTROGEN RECEPTOR NEGATIVE (HCC): ICD-10-CM

## 2021-06-29 DIAGNOSIS — L65.9 ALOPECIA: ICD-10-CM

## 2021-06-29 DIAGNOSIS — I10 ESSENTIAL HYPERTENSION: ICD-10-CM

## 2021-06-29 DIAGNOSIS — C50.411 MALIGNANT NEOPLASM OF UPPER-OUTER QUADRANT OF RIGHT BREAST IN FEMALE, ESTROGEN RECEPTOR NEGATIVE (HCC): ICD-10-CM

## 2021-06-29 DIAGNOSIS — Z00.00 HEALTHCARE MAINTENANCE: Primary | ICD-10-CM

## 2021-06-29 PROCEDURE — 99213 OFFICE O/P EST LOW 20 MIN: CPT | Performed by: INTERNAL MEDICINE

## 2021-06-29 PROCEDURE — 1159F MED LIST DOCD IN RCRD: CPT | Performed by: INTERNAL MEDICINE

## 2021-06-29 PROCEDURE — G0439 PPPS, SUBSEQ VISIT: HCPCS | Performed by: INTERNAL MEDICINE

## 2021-06-29 PROCEDURE — 1170F FXNL STATUS ASSESSED: CPT | Performed by: INTERNAL MEDICINE

## 2021-06-29 PROCEDURE — 96160 PT-FOCUSED HLTH RISK ASSMT: CPT | Performed by: INTERNAL MEDICINE

## 2021-06-29 RX ORDER — LANOLIN ALCOHOL/MO/W.PET/CERES
1000 CREAM (GRAM) TOPICAL DAILY
Qty: 90 TABLET | Refills: 3 | Status: SHIPPED | OUTPATIENT
Start: 2021-06-29

## 2021-06-29 NOTE — PROGRESS NOTES
Subjective   AWV  Hair loss  Hypertension  Hyperlipidemia    Meenakshi Mae is a 55 y.o. female who presents for an AWV.  In general she is doing well today. She notes that in general she is feeling well. She noticed quite a lot of hair loss when combing hair on Friday. She is a breast cancer survivor. She last had ctx 7 years ago. Large volume hair loss is new. Usually notes about 1/4 as much when combing.   Patient is getting healthy self care with walking, dancing, and swimming. 5 pound weight loss in the last 6 months through healthy life changes. Normal mammo 6/21/21.   Patient w/ hypertension. She missed meds this morning.       She is current on vac, dental, eye, cscope. She is s/p hysterectomy  She reports that physical anc mental health are the same as last year. She is not taking aspirin and it is not advised.     Review of Systems   Constitutional: Negative.    HENT: Negative.    Eyes: Negative.    Respiratory: Negative.    Cardiovascular: Negative.    Gastrointestinal: Negative.        The following portions of the patient's history were reviewed and updated as appropriate: allergies, current medications, past family history, past medical history, past social history, past surgical history and problem list.     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 (CMS/HCC)   • Atopic rhinitis   • Depression   • Hematuria   • Neuropathy   • Mild intermittent asthma without complication   • Vitamin D deficiency   • Healthcare maintenance   • Osteopenia   • Bulge of lumbar disc without myelopathy   • Lumbar facet arthropathy   • Synovial cyst of lumbar facet joint       Past Medical History:   Diagnosis Date   • Anemia    • Depression    • H/O transfusion of packed red blood cells    • Headache    • History of low back pain    •  Malignant neoplasm of breast (CMS/HCC) 2013    Right, T2N1M0, Stage IIB       Past Surgical History:   Procedure Laterality Date   • BREAST BIOPSY     • HAND SURGERY Left 2015   • HYSTERECTOMY  2008   • MASTECTOMY MODIFIED RADICAL Right 02/21/2013       Family History   Problem Relation Age of Onset   • Heart disease Mother    • Hypertension Mother    • Stomach cancer Father    • Ovarian cancer Maternal Aunt    • Stomach cancer Maternal Aunt        Social History     Socioeconomic History   • Marital status:      Spouse name: Not on file   • Number of children: Not on file   • Years of education: College   • Highest education level: Not on file   Tobacco Use   • Smoking status: Former Smoker     Packs/day: 1.00     Years: 6.00     Pack years: 6.00     Types: Cigarettes   • Smokeless tobacco: Former User   • Tobacco comment: Smoked from age [age].   Substance and Sexual Activity   • Alcohol use: Yes     Comment: Occasional   • Drug use: No       Current Outpatient Medications on File Prior to Visit   Medication Sig Dispense Refill   • albuterol sulfate  (90 Base) MCG/ACT inhaler TAKE 2 PUFFS BY MOUTH EVERY 4 HOURS AS NEEDED FOR WHEEZE 18 g 4   • amLODIPine (NORVASC) 5 MG tablet Take 1 tablet by mouth Daily. 90 tablet 3   • cyclobenzaprine (FLEXERIL) 10 MG tablet TAKE 1 TABLET BY MOUTH TWICE A DAY AS NEEDED MUSCLE SPASMS 30 tablet 4   • DEXILANT 60 MG capsule TAKE 1 CAPSULE BY MOUTH EVERY DAY 90 capsule 0   • fluticasone (Flovent Diskus) 50 MCG/BLIST diskus inhaler Inhale 1 puff 2 (Two) Times a Day. 1 each 12   • montelukast (SINGULAIR) 10 MG tablet Take 1 tablet by mouth every night at bedtime. 90 tablet 3   • PARoxetine (PAXIL) 40 MG tablet TAKE 1 TABLET BY MOUTH EVERY DAY IN THE MORNING 90 tablet 1   • rosuvastatin (CRESTOR) 10 MG tablet Take 1 tablet by mouth Daily. 90 tablet 3   • traZODone (DESYREL) 50 MG tablet TAKE 2 TABLETS BY MOUTH EVERY DAY AT BEDTIME 180 tablet 3   • diclofenac (VOLTAREN) 1  "% gel gel APPLY 4 GRAMS TO AFFECTED AREA(S) FOUR TIMES A DAY 1 tube 4   • Triamcinolone Acetonide (NASACORT) 55 MCG/ACT nasal inhaler 2 sprays into the nostril(s) as directed by provider Daily. 16.5 g 11     No current facility-administered medications on file prior to visit.       Allergies   Allergen Reactions   • Erythromycin Hives       Immunization History   Administered Date(s) Administered   • COVID-19 (PFIZER) 01/01/2021, 02/01/2021   • Flucelvax Quad Vial =>4yrs 11/06/2018   • Flulaval/Fluarix/Fluzone Quad 09/10/2020   • Hepatitis A 10/05/2018, 07/23/2019   • Influenza, Unspecified 10/18/2019   • Pneumococcal, Unspecified 09/30/2013   • Tdap 09/30/2013   • flucelvax quad pfs =>4 YRS 10/18/2019       Objective     /88   Pulse 90   Ht 167.6 cm (66\")   Wt 75.3 kg (166 lb)   BMI 26.79 kg/m²     Physical Exam  Vitals and nursing note reviewed.   Constitutional:       Appearance: Normal appearance. She is well-developed.   HENT:      Head: Normocephalic and atraumatic.      Right Ear: Tympanic membrane and external ear normal.      Left Ear: Tympanic membrane and external ear normal.      Nose: Nose normal.      Mouth/Throat:      Mouth: Mucous membranes are moist.   Eyes:      Extraocular Movements: Extraocular movements intact.      Pupils: Pupils are equal, round, and reactive to light.   Cardiovascular:      Rate and Rhythm: Normal rate and regular rhythm.      Pulses: Normal pulses.      Heart sounds: Normal heart sounds.   Pulmonary:      Effort: Pulmonary effort is normal. No respiratory distress.      Breath sounds: Normal breath sounds.   Abdominal:      General: Abdomen is flat.      Palpations: Abdomen is soft.   Musculoskeletal:         General: Normal range of motion.      Cervical back: Normal range of motion and neck supple.   Skin:     General: Skin is warm and dry.   Neurological:      General: No focal deficit present.      Mental Status: She is alert and oriented to person, place, and " time.   Psychiatric:         Mood and Affect: Mood normal.         Behavior: Behavior normal.         Thought Content: Thought content normal.         Judgment: Judgment normal.         Assessment/Plan   Diagnoses and all orders for this visit:    1. Healthcare maintenance (Primary)    2. Alopecia  -     Ambulatory Referral to Dermatology    3. Essential hypertension    4. Malignant neoplasm of upper-outer quadrant of right breast in female, estrogen receptor negative (CMS/HCC)    Other orders  -     vitamin B-12 (CYANOCOBALAMIN) 1000 MCG tablet; Take 1 tablet by mouth Daily.  Dispense: 90 tablet; Refill: 3        Discussion    Patient presents today for a CPE. Patient follows a healthy diet.   Patient follows an adequate exercise regimen. Mammogram is up to date.   Colon cancer screening is up to date.   Pap smear no longer performed secondary to hysterectomy.  Patient has noticed some recent hairloss. She has a history of 12 def. Will start b12 tabs and mvi. Gentle brushing. To derm for further evaluation. She will monitor bp at home while taking amlodipine. She will be more consistent in dosing. She iwll follow up in 6months or prn.            Future Appointments   Date Time Provider Department Center   8/26/2021 11:00 AM [facility]  [facility]   2/21/2022  9:10 AM LAB CHAIR 3 [facility]  [facility]   2/21/2022  9:40 AM Osmin Edmonds II, MD DRE [facility] [place]

## 2021-08-26 ENCOUNTER — APPOINTMENT (OUTPATIENT)
Dept: BONE DENSITY | Facility: HOSPITAL | Age: 56
End: 2021-08-26

## 2021-08-26 ENCOUNTER — HOSPITAL ENCOUNTER (OUTPATIENT)
Dept: BONE DENSITY | Facility: HOSPITAL | Age: 56
Discharge: HOME | End: 2021-08-26
Admitting: INTERNAL MEDICINE

## 2021-08-26 PROCEDURE — 77080 DXA BONE DENSITY AXIAL: CPT

## 2021-08-27 ENCOUNTER — TELEPHONE (OUTPATIENT)
Dept: INTERNAL MEDICINE | Facility: CLINIC | Age: 56
End: 2021-08-27

## 2021-08-27 NOTE — TELEPHONE ENCOUNTER
----- Message from Vandana Zavaleta MD sent at 8/26/2021  6:04 PM EDT -----  Your DEXA result reveals osteopenia. Consume 1500 mg dietary calcium daily and take a 2000 iu vitamin D3 daily. Engage in routine weight bearing exercises. You should repeat a DEXA screen in 2-3 years.   Sincerely,   Vandana Zavaleta MD

## 2021-09-01 RX ORDER — PAROXETINE HYDROCHLORIDE 40 MG/1
TABLET, FILM COATED ORAL
Qty: 90 TABLET | Refills: 1 | Status: SHIPPED | OUTPATIENT
Start: 2021-09-01 | End: 2021-12-28 | Stop reason: SDUPTHER

## 2021-09-02 ENCOUNTER — APPOINTMENT (OUTPATIENT)
Dept: MAMMOGRAPHY | Facility: HOSPITAL | Age: 56
End: 2021-09-02

## 2021-09-28 ENCOUNTER — TELEPHONE (OUTPATIENT)
Dept: INTERNAL MEDICINE | Facility: CLINIC | Age: 56
End: 2021-09-28

## 2021-09-28 ENCOUNTER — OFFICE VISIT (OUTPATIENT)
Dept: INTERNAL MEDICINE | Facility: CLINIC | Age: 56
End: 2021-09-28

## 2021-09-28 VITALS
HEART RATE: 109 BPM | DIASTOLIC BLOOD PRESSURE: 88 MMHG | BODY MASS INDEX: 25.39 KG/M2 | SYSTOLIC BLOOD PRESSURE: 136 MMHG | WEIGHT: 158 LBS | HEIGHT: 66 IN

## 2021-09-28 DIAGNOSIS — F41.9 ANXIETY: Primary | ICD-10-CM

## 2021-09-28 DIAGNOSIS — G47.00 INSOMNIA, UNSPECIFIED TYPE: ICD-10-CM

## 2021-09-28 PROCEDURE — 99214 OFFICE O/P EST MOD 30 MIN: CPT | Performed by: INTERNAL MEDICINE

## 2021-09-28 RX ORDER — CLONAZEPAM 0.5 MG/1
0.5 TABLET ORAL 2 TIMES DAILY PRN
Qty: 40 TABLET | Refills: 1 | Status: SHIPPED | OUTPATIENT
Start: 2021-09-28 | End: 2021-12-28 | Stop reason: SDUPTHER

## 2021-09-28 NOTE — PROGRESS NOTES
"Chief Complaint   Patient presents with   • Anxiety   • Depression     grief       History of Present Illness   Meenakshi Mae is a 56 y.o. female presents for acute care. Lost her mother recently. She is now grieving deeply. She is taking paroxetine for anxiety. She notes feeling \"very\" anxious at this time. \"yesterday I found myself just walking in a [place]\". When times are quiet it is especially difficult. Unable to sleep at night. Taking trazodone without benefit. Has not yet started grief counseling.   Decreased appetite.       The following portions of the patient's history were reviewed and updated as appropriate: allergies, current medications, past family history, past medical history, past social history, past surgical history and problem list.  Current Outpatient Medications on File Prior to Visit   Medication Sig Dispense Refill   • albuterol sulfate  (90 Base) MCG/ACT inhaler TAKE 2 PUFFS BY MOUTH EVERY 4 HOURS AS NEEDED FOR WHEEZE 18 g 4   • amLODIPine (NORVASC) 5 MG tablet Take 1 tablet by mouth Daily. 90 tablet 3   • cyclobenzaprine (FLEXERIL) 10 MG tablet TAKE 1 TABLET BY MOUTH TWICE A DAY AS NEEDED MUSCLE SPASMS 30 tablet 4   • DEXILANT 60 MG capsule TAKE 1 CAPSULE BY MOUTH EVERY DAY 90 capsule 0   • diclofenac (VOLTAREN) 1 % gel gel APPLY 4 GRAMS TO AFFECTED AREA(S) FOUR TIMES A DAY 1 tube 4   • fluticasone (Flovent Diskus) 50 MCG/BLIST diskus inhaler Inhale 1 puff 2 (Two) Times a Day. 1 each 12   • montelukast (SINGULAIR) 10 MG tablet Take 1 tablet by mouth every night at bedtime. 90 tablet 3   • PARoxetine (PAXIL) 40 MG tablet TAKE 1 TABLET BY MOUTH EVERY DAY IN THE MORNING 90 tablet 1   • rosuvastatin (CRESTOR) 10 MG tablet Take 1 tablet by mouth Daily. 90 tablet 3   • traZODone (DESYREL) 50 MG tablet TAKE 2 TABLETS BY MOUTH EVERY DAY AT BEDTIME 180 tablet 3   • Triamcinolone Acetonide (NASACORT) 55 MCG/ACT nasal inhaler 2 sprays into the nostril(s) as directed by provider Daily. 16.5 " "g 11   • vitamin B-12 (CYANOCOBALAMIN) 1000 MCG tablet Take 1 tablet by mouth Daily. 90 tablet 3     No current facility-administered medications on file prior to visit.     Review of Systems   Constitutional: Positive for appetite change and fatigue.   HENT: Negative.    Eyes: Negative.    Respiratory: Negative.    Cardiovascular: Negative.    Gastrointestinal: Negative.    Endocrine: Negative.    Genitourinary: Negative.    Musculoskeletal: Negative.    Skin: Negative.    Allergic/Immunologic: Negative.    Neurological: Negative.    Hematological: Negative.    Psychiatric/Behavioral: Positive for sleep disturbance. The patient is nervous/anxious.        Objective   Physical Exam  Vitals and nursing note reviewed.   Constitutional:       Appearance: She is well-developed.   HENT:      Head: Normocephalic and atraumatic.      Right Ear: External ear normal.      Left Ear: External ear normal.      Nose: Nose normal.      Mouth/Throat:      Mouth: Mucous membranes are moist.   Eyes:      Extraocular Movements: Extraocular movements intact.      Pupils: Pupils are equal, round, and reactive to light.   Cardiovascular:      Rate and Rhythm: Normal rate and regular rhythm.      Heart sounds: Normal heart sounds.   Pulmonary:      Effort: Pulmonary effort is normal. No respiratory distress.      Breath sounds: Normal breath sounds.   Abdominal:      Palpations: Abdomen is soft.   Musculoskeletal:         General: Normal range of motion.      Cervical back: Neck supple.   Skin:     General: Skin is warm and dry.   Neurological:      General: No focal deficit present.      Mental Status: She is alert and oriented to person, place, and time.   Psychiatric:         Behavior: Behavior normal.      Comments: Anxious  Very tearful            /88   Pulse 109   Ht 167.6 cm (66\")   Wt 71.7 kg (158 lb)   BMI 25.50 kg/m²     Assessment/Plan   Diagnoses and all orders for this visit:    Anxiety  -     clonazePAM (KlonoPIN) " 0.5 MG tablet; Take 1 tablet by mouth 2 (Two) Times a Day As Needed for Anxiety.    Insomnia, unspecified type      Patient with acute grief. Anxiety and depression. She has contracted for safety. She may try clonazepam 1/2-1 tablet 1-2 times daily. Encouraged healthy rest, nutrition and activity. To start a gratitude journal and wo start a creative outlet. She may start volunteerism as well.

## 2021-09-28 NOTE — TELEPHONE ENCOUNTER
Caller: Meenakshi Mae    Relationship: Self    Best call back number: [phone]    What is the best time to reach you: ANYTIME    Who are you requesting to speak with (clinical staff, provider,  specific staff member): DR COX OR MA    What was the call regarding: PATIENT STATES SHE WOULD LIKE TO DISCUSS INCREASING ANXIETY AND SLEEP MEDICATION    Do you require a callback: YES

## 2021-10-08 ENCOUNTER — TELEPHONE (OUTPATIENT)
Dept: INTERNAL MEDICINE | Facility: CLINIC | Age: 56
End: 2021-10-08

## 2021-10-08 ENCOUNTER — DOCUMENTATION (OUTPATIENT)
Dept: INTERNAL MEDICINE | Facility: CLINIC | Age: 56
End: 2021-10-08
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
## 2021-10-08 NOTE — TELEPHONE ENCOUNTER
Patient called.  Very upset. She is having a hard time grieving her mothers death.     Patient's daughter said she needs to go to hospital and be admitted because the way she is acting.  I asked the patient if she is having any thoughts of hurting herself and she said no, but she said she is hurting others.    Needs a call from Arle[facility] or Dr Zavaleta.

## 2021-10-08 NOTE — TELEPHONE ENCOUNTER
Spoke with pt and she states she is fighting with her family since the death of her mother.  She states it is making her anxiety worse and feels she she be admitted somewhere    Spoke with Dr Zavaleta and gave pt 3 locations of places she could call    Pt understands and will have daughter take her there today    Pt informed that she can call here if needed anything further and     Locations sent to pt in my chart per her request

## 2021-10-26 ENCOUNTER — OFFICE VISIT (OUTPATIENT)
Dept: INTERNAL MEDICINE | Facility: CLINIC | Age: 56
End: 2021-10-26

## 2021-10-26 VITALS
HEIGHT: 66 IN | WEIGHT: 156.4 LBS | HEART RATE: 82 BPM | OXYGEN SATURATION: 99 % | TEMPERATURE: 98.2 F | BODY MASS INDEX: 25.13 KG/M2 | SYSTOLIC BLOOD PRESSURE: 126 MMHG | DIASTOLIC BLOOD PRESSURE: 72 MMHG

## 2021-10-26 DIAGNOSIS — G47.00 INSOMNIA, UNSPECIFIED TYPE: ICD-10-CM

## 2021-10-26 DIAGNOSIS — F41.9 ANXIETY: Primary | ICD-10-CM

## 2021-10-26 DIAGNOSIS — I10 ESSENTIAL HYPERTENSION: ICD-10-CM

## 2021-10-26 DIAGNOSIS — F32.A DEPRESSION, UNSPECIFIED DEPRESSION TYPE: ICD-10-CM

## 2021-10-26 PROCEDURE — 90686 IIV4 VACC NO PRSV 0.5 ML IM: CPT | Performed by: INTERNAL MEDICINE

## 2021-10-26 PROCEDURE — G0008 ADMIN INFLUENZA VIRUS VAC: HCPCS | Performed by: INTERNAL MEDICINE

## 2021-10-26 PROCEDURE — 99214 OFFICE O/P EST MOD 30 MIN: CPT | Performed by: INTERNAL MEDICINE

## 2021-10-26 RX ORDER — ESZOPICLONE 2 MG/1
2 TABLET, FILM COATED ORAL NIGHTLY
Qty: 30 TABLET | Refills: 1 | Status: SHIPPED | OUTPATIENT
Start: 2021-10-26 | End: 2021-12-28 | Stop reason: SDUPTHER

## 2021-10-26 NOTE — PROGRESS NOTES
Chief Complaint   Patient presents with   • Anxiety   • Insomnia   • Depression       History of Present Illness   Meenakshi Mae is a 56 y.o. female presents for follow up evaluation. In general patient is feeling less anxiouswith improved mood. She recently lost her mother. She has difficulties being around many of her family members so she has excluded this from her life w/ benefit. She is getting comfort from her aunt who has dreams that involve her mother. Taking paroxetine daily w/ clonazepam bid. She is meeting w/ a therapist by phone. Last session was greater than one week ago. Has not scheduled for a repeat.   Patient has lost 4 friends after the loss of her mother. This has been very hard.   Continues to struggle with getting sleep. She may only get 2 hours at night.   She has tried trazodone with limited benefit.     The following portions of the patient's history were reviewed and updated as appropriate: allergies, current medications, past family history, past medical history, past social history, past surgical history and problem list.  Current Outpatient Medications on File Prior to Visit   Medication Sig Dispense Refill   • albuterol sulfate  (90 Base) MCG/ACT inhaler TAKE 2 PUFFS BY MOUTH EVERY 4 HOURS AS NEEDED FOR WHEEZE 18 g 4   • amLODIPine (NORVASC) 5 MG tablet Take 1 tablet by mouth Daily. 90 tablet 3   • clonazePAM (KlonoPIN) 0.5 MG tablet Take 1 tablet by mouth 2 (Two) Times a Day As Needed for Anxiety. 40 tablet 1   • cyclobenzaprine (FLEXERIL) 10 MG tablet TAKE 1 TABLET BY MOUTH TWICE A DAY AS NEEDED MUSCLE SPASMS 30 tablet 4   • DEXILANT 60 MG capsule TAKE 1 CAPSULE BY MOUTH EVERY DAY 90 capsule 0   • diclofenac (VOLTAREN) 1 % gel gel APPLY 4 GRAMS TO AFFECTED AREA(S) FOUR TIMES A DAY 1 tube 4   • montelukast (SINGULAIR) 10 MG tablet Take 1 tablet by mouth every night at bedtime. 90 tablet 3   • PARoxetine (PAXIL) 40 MG tablet TAKE 1 TABLET BY MOUTH EVERY DAY IN THE MORNING 90  tablet 1   • rosuvastatin (CRESTOR) 10 MG tablet Take 1 tablet by mouth Daily. 90 tablet 3   • Triamcinolone Acetonide (NASACORT) 55 MCG/ACT nasal inhaler 2 sprays into the nostril(s) as directed by provider Daily. 16.5 g 11   • vitamin B-12 (CYANOCOBALAMIN) 1000 MCG tablet Take 1 tablet by mouth Daily. 90 tablet 3   • [DISCONTINUED] traZODone (DESYREL) 50 MG tablet TAKE 2 TABLETS BY MOUTH EVERY DAY AT BEDTIME 180 tablet 3   • fluticasone (Flovent Diskus) 50 MCG/BLIST diskus inhaler Inhale 1 puff 2 (Two) Times a Day. 1 each 12     No current facility-administered medications on file prior to visit.     Review of Systems   Constitutional: Negative.    HENT: Negative.    Eyes: Negative.    Respiratory: Negative.    Cardiovascular: Negative.    Gastrointestinal: Negative.    Endocrine: Negative.    Genitourinary: Negative.    Musculoskeletal: Negative.    Skin: Negative.    Allergic/Immunologic: Negative.    Neurological: Negative.    Hematological: Negative.    Psychiatric/Behavioral: Negative.        Objective   Physical Exam  Vitals and nursing note reviewed.   Constitutional:       Appearance: Normal appearance. She is well-developed.   HENT:      Head: Normocephalic and atraumatic.      Right Ear: Tympanic membrane and external ear normal.      Left Ear: Tympanic membrane and external ear normal.      Nose: Nose normal.      Mouth/Throat:      Mouth: Mucous membranes are moist.   Eyes:      Extraocular Movements: Extraocular movements intact.      Pupils: Pupils are equal, round, and reactive to light.   Cardiovascular:      Rate and Rhythm: Normal rate and regular rhythm.      Pulses: Normal pulses.      Heart sounds: Normal heart sounds.   Pulmonary:      Effort: Pulmonary effort is normal. No respiratory distress.      Breath sounds: Normal breath sounds.   Abdominal:      General: Abdomen is flat.      Palpations: Abdomen is soft.   Musculoskeletal:         General: Normal range of motion.      Cervical back:  "Normal range of motion and neck supple.   Skin:     General: Skin is warm and dry.   Neurological:      General: No focal deficit present.      Mental Status: She is alert and oriented to person, place, and time.   Psychiatric:         Mood and Affect: Mood normal.         Behavior: Behavior normal.         Thought Content: Thought content normal.         Judgment: Judgment normal.          /72   Pulse 82   Temp 98.2 °F (36.8 °C)   Ht 167.6 cm (66\")   Wt 70.9 kg (156 lb 6.4 oz)   SpO2 99%   BMI 25.24 kg/m²     Assessment/Plan   Diagnoses and all orders for this visit:    Anxiety    Depression, unspecified depression type    Essential hypertension    Insomnia, unspecified type        Patient w/ anxiety, depression, and insomnia w/ grief. She will continue paroxetine w/ clonazepam as needed. Will try lunesta prn for sleep. She is aware of the risks and benefits of this medication and she did sign a controlled substance agreement form. She has normotensive bp and will monitor this to see if reduced med is indicated. She will f/u w/ counseling routinely and is encouraged to seek routine counseling. She will f/u here in 3 months or prn.            Answers for HPI/ROS submitted by the patient on 10/19/2021  Please describe your symptoms.: Grieving  Have you had these symptoms before?: No  How long have you been having these symptoms?: 1-2 weeks  What is the primary reason for your visit?: Other      "

## 2021-11-05 RX ORDER — CYCLOBENZAPRINE HCL 10 MG
TABLET ORAL
Qty: 30 TABLET | Refills: 4 | Status: SHIPPED | OUTPATIENT
Start: 2021-11-05 | End: 2022-06-17

## 2021-12-28 ENCOUNTER — OFFICE VISIT (OUTPATIENT)
Dept: INTERNAL MEDICINE | Facility: CLINIC | Age: 56
End: 2021-12-28

## 2021-12-28 VITALS
HEART RATE: 98 BPM | BODY MASS INDEX: 25.55 KG/M2 | DIASTOLIC BLOOD PRESSURE: 70 MMHG | HEIGHT: 66 IN | WEIGHT: 159 LBS | SYSTOLIC BLOOD PRESSURE: 118 MMHG

## 2021-12-28 DIAGNOSIS — I10 ESSENTIAL HYPERTENSION: ICD-10-CM

## 2021-12-28 DIAGNOSIS — E78.2 MIXED HYPERLIPIDEMIA: ICD-10-CM

## 2021-12-28 DIAGNOSIS — F41.9 ANXIETY: Primary | ICD-10-CM

## 2021-12-28 DIAGNOSIS — F51.01 PRIMARY INSOMNIA: ICD-10-CM

## 2021-12-28 DIAGNOSIS — G47.00 INSOMNIA, UNSPECIFIED TYPE: ICD-10-CM

## 2021-12-28 PROCEDURE — 99214 OFFICE O/P EST MOD 30 MIN: CPT | Performed by: INTERNAL MEDICINE

## 2021-12-28 RX ORDER — ESZOPICLONE 2 MG/1
2 TABLET, FILM COATED ORAL NIGHTLY
Qty: 30 TABLET | Refills: 5 | Status: SHIPPED | OUTPATIENT
Start: 2021-12-28 | End: 2022-07-18

## 2021-12-28 RX ORDER — PAROXETINE HYDROCHLORIDE 40 MG/1
40 TABLET, FILM COATED ORAL EVERY MORNING
Qty: 90 TABLET | Refills: 3 | Status: SHIPPED | OUTPATIENT
Start: 2021-12-28 | End: 2023-01-30 | Stop reason: SDUPTHER

## 2021-12-28 RX ORDER — CLONAZEPAM 0.5 MG/1
0.5 TABLET ORAL 2 TIMES DAILY PRN
Qty: 60 TABLET | Refills: 2 | Status: SHIPPED | OUTPATIENT
Start: 2021-12-28 | End: 2022-07-18

## 2021-12-28 NOTE — PROGRESS NOTES
Chief Complaint   Patient presents with   • Hypertension   • Depression     better   • Hyperlipidemia   • Insomnia       History of Present Illness   Meenakshi Mae is a 56 y.o. female presents for follow up evaluation. Patient has a hisotry of anxiety and depression. She has been engaged in healthy nutrition and fitness. She was trying to use this to regulate mood. She is taking paroxetine daily. Notes that she requires clonazepam most days. Twice a day a couple days a week. More anxious when at [employer] as it triggers her grief from losing her mother. Patient has hypertension. bp has been normotensive. Insomnia has improved with lunesta. No side effects.             The following portions of the patient's history were reviewed and updated as appropriate: allergies, current medications, past family history, past medical history, past social history, past surgical history and problem list.  Current Outpatient Medications on File Prior to Visit   Medication Sig Dispense Refill   • albuterol sulfate  (90 Base) MCG/ACT inhaler TAKE 2 PUFFS BY MOUTH EVERY 4 HOURS AS NEEDED FOR WHEEZE 18 g 4   • amLODIPine (NORVASC) 5 MG tablet Take 1 tablet by mouth Daily. 90 tablet 3   • cyclobenzaprine (FLEXERIL) 10 MG tablet TAKE 1 TABLET BY MOUTH TWICE A DAY AS NEEDED MUSCLE SPASMS 30 tablet 4   • DEXILANT 60 MG capsule TAKE 1 CAPSULE BY MOUTH EVERY DAY 90 capsule 0   • diclofenac (VOLTAREN) 1 % gel gel APPLY 4 GRAMS TO AFFECTED AREA(S) FOUR TIMES A DAY 1 tube 4   • fluticasone (Flovent Diskus) 50 MCG/BLIST diskus inhaler Inhale 1 puff 2 (Two) Times a Day. 1 each 12   • montelukast (SINGULAIR) 10 MG tablet Take 1 tablet by mouth every night at bedtime. 90 tablet 3   • rosuvastatin (CRESTOR) 10 MG tablet Take 1 tablet by mouth Daily. 90 tablet 3   • Triamcinolone Acetonide (NASACORT) 55 MCG/ACT nasal inhaler 2 sprays into the nostril(s) as directed by provider Daily. 16.5 g 11   • vitamin B-12 (CYANOCOBALAMIN) 1000 MCG tablet  Take 1 tablet by mouth Daily. 90 tablet 3   • [DISCONTINUED] clonazePAM (KlonoPIN) 0.5 MG tablet Take 1 tablet by mouth 2 (Two) Times a Day As Needed for Anxiety. 40 tablet 1   • [DISCONTINUED] eszopiclone (Lunesta) 2 MG tablet Take 1 tablet by mouth Every Night. Take immediately before bedtime 30 tablet 1   • [DISCONTINUED] PARoxetine (PAXIL) 40 MG tablet TAKE 1 TABLET BY MOUTH EVERY DAY IN THE MORNING 90 tablet 1     No current facility-administered medications on file prior to visit.     Review of Systems   Constitutional: Negative.    HENT: Negative.    Eyes: Negative.    Respiratory: Negative.    Cardiovascular: Negative.    Gastrointestinal: Negative.    Endocrine: Negative.    Genitourinary: Negative.    Musculoskeletal: Negative.    Skin: Negative.    Allergic/Immunologic: Negative.    Neurological: Negative.    Hematological: Negative.    Psychiatric/Behavioral: Negative.        Objective   Physical Exam  Vitals and nursing note reviewed.   Constitutional:       Appearance: Normal appearance. She is well-developed.   HENT:      Head: Normocephalic and atraumatic.      Right Ear: Tympanic membrane and external ear normal.      Left Ear: Tympanic membrane and external ear normal.      Nose: Nose normal.      Mouth/Throat:      Mouth: Mucous membranes are moist.   Eyes:      Extraocular Movements: Extraocular movements intact.      Pupils: Pupils are equal, round, and reactive to light.   Cardiovascular:      Rate and Rhythm: Normal rate and regular rhythm.      Pulses: Normal pulses.      Heart sounds: Normal heart sounds.   Pulmonary:      Effort: Pulmonary effort is normal. No respiratory distress.      Breath sounds: Normal breath sounds.   Abdominal:      General: Abdomen is flat. Bowel sounds are normal.      Palpations: Abdomen is soft.   Musculoskeletal:         General: Normal range of motion.      Cervical back: Normal range of motion and neck supple.   Skin:     General: Skin is warm and dry.  "  Neurological:      General: No focal deficit present.      Mental Status: She is alert and oriented to person, place, and time.   Psychiatric:         Mood and Affect: Mood normal.         Behavior: Behavior normal.         Thought Content: Thought content normal.         Judgment: Judgment normal.          /70   Pulse 98   Ht 167.6 cm (66\")   Wt 72.1 kg (159 lb)   BMI 25.66 kg/m²     Assessment/Plan   Diagnoses and all orders for this visit:    Anxiety  -     clonazePAM (KlonoPIN) 0.5 MG tablet; Take 1 tablet by mouth 2 (Two) Times a Day As Needed for Anxiety.    Essential hypertension    Mixed hyperlipidemia    Primary insomnia    Insomnia, unspecified type  -     eszopiclone (Lunesta) 2 MG tablet; Take 1 tablet by mouth Every Night. Take immediately before bedtime    Other orders  -     PARoxetine (PAXIL) 40 MG tablet; Take 1 tablet by mouth Every Morning.      Patient w/ anxiety. Will continue paroxetine daily. To use clonazepam in a prn fashion and will continue to try to reduce dosing. Aware of risks and benefits of med and has signed a controlled sub agreement. She will monitor bp and will repeat lipid levels prior to next visit.   She will continue to meet w [facility].  To continue lunesta for sleep. She is aware of the risks and benefits of this medication.            "

## 2022-02-28 ENCOUNTER — OFFICE VISIT (OUTPATIENT)
Dept: ONCOLOGY | Facility: CLINIC | Age: 57
End: 2022-02-28

## 2022-02-28 ENCOUNTER — LAB (OUTPATIENT)
Dept: LAB | Facility: HOSPITAL | Age: 57
End: 2022-02-28

## 2022-02-28 VITALS
BODY MASS INDEX: 24.64 KG/M2 | HEIGHT: 66 IN | WEIGHT: 153.3 LBS | DIASTOLIC BLOOD PRESSURE: 78 MMHG | TEMPERATURE: 97 F | OXYGEN SATURATION: 98 % | HEART RATE: 85 BPM | SYSTOLIC BLOOD PRESSURE: 131 MMHG | RESPIRATION RATE: 17 BRPM

## 2022-02-28 DIAGNOSIS — C50.411 MALIGNANT NEOPLASM OF UPPER-OUTER QUADRANT OF RIGHT BREAST IN FEMALE, ESTROGEN RECEPTOR NEGATIVE: Primary | ICD-10-CM

## 2022-02-28 DIAGNOSIS — Z17.1 MALIGNANT NEOPLASM OF UPPER-OUTER QUADRANT OF RIGHT BREAST IN FEMALE, ESTROGEN RECEPTOR NEGATIVE: Primary | ICD-10-CM

## 2022-02-28 LAB
BASOPHILS # BLD AUTO: 0.06 10*3/MM3 (ref 0–0.2)
BASOPHILS NFR BLD AUTO: 0.8 % (ref 0–1.5)
DEPRECATED RDW RBC AUTO: 43.3 FL (ref 37–54)
EOSINOPHIL # BLD AUTO: 0.11 10*3/MM3 (ref 0–0.4)
EOSINOPHIL NFR BLD AUTO: 1.4 % (ref 0.3–6.2)
ERYTHROCYTE [DISTWIDTH] IN BLOOD BY AUTOMATED COUNT: 13.9 % (ref 12.3–15.4)
HCT VFR BLD AUTO: 39.2 % (ref 34–46.6)
HGB BLD-MCNC: 12.7 G/DL (ref 12–15.9)
IMM GRANULOCYTES # BLD AUTO: 0.04 10*3/MM3 (ref 0–0.05)
IMM GRANULOCYTES NFR BLD AUTO: 0.5 % (ref 0–0.5)
LYMPHOCYTES # BLD AUTO: 2.28 10*3/MM3 (ref 0.7–3.1)
LYMPHOCYTES NFR BLD AUTO: 28.6 % (ref 19.6–45.3)
MCH RBC QN AUTO: 27.9 PG (ref 26.6–33)
MCHC RBC AUTO-ENTMCNC: 32.4 G/DL (ref 31.5–35.7)
MCV RBC AUTO: 86 FL (ref 79–97)
MONOCYTES # BLD AUTO: 0.56 10*3/MM3 (ref 0.1–0.9)
MONOCYTES NFR BLD AUTO: 7 % (ref 5–12)
NEUTROPHILS NFR BLD AUTO: 4.92 10*3/MM3 (ref 1.7–7)
NEUTROPHILS NFR BLD AUTO: 61.7 % (ref 42.7–76)
NRBC BLD AUTO-RTO: 0 /100 WBC (ref 0–0.2)
PLATELET # BLD AUTO: 299 10*3/MM3 (ref 140–450)
PMV BLD AUTO: 9.5 FL (ref 6–12)
RBC # BLD AUTO: 4.56 10*6/MM3 (ref 3.77–5.28)
WBC NRBC COR # BLD: 7.97 10*3/MM3 (ref 3.4–10.8)

## 2022-02-28 PROCEDURE — 99214 OFFICE O/P EST MOD 30 MIN: CPT | Performed by: INTERNAL MEDICINE

## 2022-02-28 PROCEDURE — 36415 COLL VENOUS BLD VENIPUNCTURE: CPT

## 2022-02-28 PROCEDURE — 85025 COMPLETE CBC W/AUTO DIFF WBC: CPT

## 2022-02-28 RX ORDER — CYCLOBENZAPRINE HCL 10 MG
TABLET ORAL
COMMUNITY
Start: 2022-01-29 | End: 2022-04-22 | Stop reason: SDUPTHER

## 2022-02-28 RX ORDER — CLONAZEPAM 0.5 MG/1
TABLET ORAL
COMMUNITY
Start: 2021-12-28 | End: 2022-04-22 | Stop reason: SDUPTHER

## 2022-02-28 RX ORDER — TRAZODONE HYDROCHLORIDE 50 MG/1
100 TABLET ORAL
COMMUNITY
Start: 2022-01-12 | End: 2022-04-27 | Stop reason: SDUPTHER

## 2022-02-28 RX ORDER — ESZOPICLONE 2 MG/1
TABLET, FILM COATED ORAL
COMMUNITY
Start: 2021-12-28 | End: 2022-04-22 | Stop reason: SDUPTHER

## 2022-02-28 RX ORDER — PAROXETINE HYDROCHLORIDE 40 MG/1
TABLET, FILM COATED ORAL
COMMUNITY
Start: 2021-12-28 | End: 2022-04-22 | Stop reason: SDUPTHER

## 2022-02-28 NOTE — PROGRESS NOTES
Subjective .     REASONS FOR FOLLOWUP:  Breast cancer    HISTORY OF PRESENT ILLNESS:  The patient is a 56 y.o. year old female  who is here for follow-up with the above-mentioned history.    Her mother passed away in September.  She is dealing with this.  No new problems.  No SOA, nausea, pain    Past Medical History:   Diagnosis Date   • Anemia    • Depression    • H/O transfusion of packed red blood cells    • Headache    • History of low back pain    • Malignant neoplasm of breast (HCC) 2013    Right, T2N1M0, Stage IIB       HEMATOLOGIC/ONCOLOGIC HISTORY:  (History from previous dates can be found in the separate document.)    MEDICATIONS    Current Outpatient Medications:   •  albuterol sulfate  (90 Base) MCG/ACT inhaler, TAKE 2 PUFFS BY MOUTH EVERY 4 HOURS AS NEEDED FOR WHEEZE, Disp: 18 g, Rfl: 4  •  amLODIPine (NORVASC) 5 MG tablet, Take 1 tablet by mouth Daily., Disp: 90 tablet, Rfl: 3  •  clonazePAM (KlonoPIN) 0.5 MG tablet, Take 1 tablet by mouth 2 (Two) Times a Day As Needed for Anxiety., Disp: 60 tablet, Rfl: 2  •  clonazePAM (KlonoPIN) 0.5 MG tablet, , Disp: , Rfl:   •  cyclobenzaprine (FLEXERIL) 10 MG tablet, TAKE 1 TABLET BY MOUTH TWICE A DAY AS NEEDED MUSCLE SPASMS, Disp: 30 tablet, Rfl: 4  •  cyclobenzaprine (FLEXERIL) 10 MG tablet, , Disp: , Rfl:   •  DEXILANT 60 MG capsule, TAKE 1 CAPSULE BY MOUTH EVERY DAY, Disp: 90 capsule, Rfl: 0  •  diclofenac (VOLTAREN) 1 % gel gel, APPLY 4 GRAMS TO AFFECTED AREA(S) FOUR TIMES A DAY, Disp: 1 tube, Rfl: 4  •  eszopiclone (Lunesta) 2 MG tablet, Take 1 tablet by mouth Every Night. Take immediately before bedtime, Disp: 30 tablet, Rfl: 5  •  eszopiclone (LUNESTA) 2 MG tablet, , Disp: , Rfl:   •  fluticasone (Flovent Diskus) 50 MCG/BLIST diskus inhaler, Inhale 1 puff 2 (Two) Times a Day., Disp: 1 each, Rfl: 12  •  montelukast (SINGULAIR) 10 MG tablet, Take 1 tablet by mouth every night at bedtime., Disp: 90 tablet, Rfl: 3  •  PARoxetine (PAXIL) 40 MG  tablet, Take 1 tablet by mouth Every Morning., Disp: 90 tablet, Rfl: 3  •  PARoxetine (PAXIL) 40 MG tablet, , Disp: , Rfl:   •  rosuvastatin (CRESTOR) 10 MG tablet, Take 1 tablet by mouth Daily., Disp: 90 tablet, Rfl: 3  •  traZODone (DESYREL) 50 MG tablet, Take 100 mg by mouth every night at bedtime., Disp: , Rfl:   •  Triamcinolone Acetonide (NASACORT) 55 MCG/ACT nasal inhaler, 2 sprays into the nostril(s) as directed by provider Daily., Disp: 16.5 g, Rfl: 11  •  vitamin B-12 (CYANOCOBALAMIN) 1000 MCG tablet, Take 1 tablet by mouth Daily., Disp: 90 tablet, Rfl: 3    ALLERGIES:     Allergies   Allergen Reactions   • Erythromycin Hives       SOCIAL HISTORY:       Social History     Socioeconomic History   • Marital status:    • Years of education: College   Tobacco Use   • Smoking status: Former Smoker     Packs/day: 1.00     Years: 6.00     Pack years: 6.00     Types: Cigarettes   • Smokeless tobacco: Former User   • Tobacco comment: Smoked from age [age].   Substance and Sexual Activity   • Alcohol use: Yes     Comment: Occasional   • Drug use: No         FAMILY HISTORY:  Family History   Problem Relation Age of Onset   • Heart disease Mother    • Hypertension Mother    • Stomach cancer Father    • Ovarian cancer Maternal Aunt    • Stomach cancer Maternal Aunt        REVIEW OF SYSTEMS:  Review of Systems   Constitutional: Negative for activity change.   HENT: Negative for nosebleeds and trouble swallowing.    Respiratory: Negative for shortness of breath and wheezing.    Cardiovascular: Negative for chest pain and palpitations.   Gastrointestinal: Positive for nausea. Negative for constipation and diarrhea.   Genitourinary: Negative for dysuria and hematuria.   Musculoskeletal: Negative for arthralgias and myalgias.   Skin: Negative for rash and wound.   Neurological: Negative for seizures and syncope.   Hematological: Does not bruise/bleed easily.   Psychiatric/Behavioral: Negative for confusion.     "      Objective    Vitals:    02/28/22 0921   BP: 131/78   Pulse: 85   Resp: 17   Temp: 97 °F (36.1 °C)   TempSrc: Temporal   SpO2: 98%   Weight: 69.5 kg (153 lb 4.8 oz)   Height: 167.6 cm (65.98\")   PainSc: 0-No pain     Current Status 2/28/2022   ECOG score 0      PHYSICAL EXAM:        CONSTITUTIONAL:  Vital signs reviewed.  No distress, looks comfortable.  EYES:  Conjunctiva and lids unremarkable.  PERRLA  EARS,NOSE,MOUTH,THROAT:  Ears and nose appear unremarkable.  Lips, teeth, gums appear unremarkable.  RESPIRATORY:  Normal respiratory effort.  Lungs clear to auscultation bilaterally.  CARDIOVASCULAR:  Normal S1, S2.  No murmurs rubs or gallops.  No significant lower extremity edema.  GASTROINTESTINAL: Abdomen appears unremarkable.  Nontender.  No hepatomegaly.  No splenomegaly.  LYMPHATIC:  No cervical, supraclavicular, axillary lymphadenopathy.  SKIN:  Warm.  No rashes.  PSYCHIATRIC:  Normal judgment and insight.  Normal mood and affect.        RECENT LABS:        WBC   Date/Time Value Ref Range Status   02/28/2022 09:10 AM 7.97 3.40 - 10.80 10*3/mm3 Final   06/22/2021 09:40 AM 6.89 3.40 - 10.80 10*3/mm3 Final     Hemoglobin   Date/Time Value Ref Range Status   02/28/2022 09:10 AM 12.7 12.0 - 15.9 g/dL Final     Platelets   Date/Time Value Ref Range Status   02/28/2022 09:10  140 - 450 10*3/mm3 Final       Assessment/Plan     ASSESSMENT:  *T2N1M0 (stage IIB) invasive ductal carcinoma of the right breast. Esther score 9 of 9 (high grade), triple-negative, 3.3 cm tumor. Right mastectomy and TRAM flap reconstruction. Completed dose-dense Adriamycin/Cytoxan with dose-dense Taxol 07/11/2013. Completed radiation October 2013.   · I reminded her Again today we would be quite unusual for her cancer to recur this late.  There is a good probability she is cured of this cancer.    · Although she is having pain in her chest with bending over, I doubt this is related to her history of cancer.  Statistically, she " is likely cured of this cancer.  However, discomfort is been getting worse over the past for 5 months.  I do think it is worth checking a CT of the chest to look for malignancy as the cause of this discomfort.  She has already not responded to a trial of steroids.  · We discussed first trying a trial of Aleve 2 tablets twice per day for 1 week.  If this does not help, she will maintain the plan for CT of the chest in a few weeks.  She knows to stay well-hydrated while using the Aleve.  She knows Aleve can contribute to peptic ulcer disease and renal dysfunction.  · CT chest 5/14/2019 (done due to chest discomfort with bending over): No evidence of malignancy.  No evidence of recurrence.  Continues in remission.    *Previously complained of chest discomfort upon bending over since around December 2018 or so.  I suspect this is related to her weight gain.  No signs of malignancy as the reason for the discomfort.    *Nausea.  IBS.  Managed by both Dr. Gallegos and Dr. Vandana Zavaleta.  No change in chronic nausea    *. Intermittent blurry vision, left eye more so than right eye.  She has seen her ophthalmologist for this.  She states nothing concerning was found.  She follows with Dr. Kiran Cabrera of neurology for this.  A prior MRI read as possible optic neuritis.  She states the follow-up MRI was fine.  She did not complain of this today.    *Overweight  Being overweight is a risk factor for breast cancer.  Ideally, she should lose weight.  Body mass index is 24.76 kg/m².  BMI 25 to <30 is overweight  BMI 30 to <35 is class 1 obesity  BMI 35 to <40 is class 2 obesity  BMI 40 or higher is class 3 obesity   Weight 168 pounds on 2/22/2021  She states Dr. Zavaleta asked her to do a 1200-calorie diet.  She states this is going relatively well.  I encouraged her to keep this up.    * 2 liver lesions on 1/31/13 staging CAT scan.  Liver protocol CT subsequently read as benign liver lesions.  Liver lesions unchanged on CT 5/4/16.    *  She has an annual  for breast cancer.      * Chronic sciatica and cervical pain.  Treatment through her PCP, Dr. Vandana Zavaleta.  As had epidurals.  Denies pain today.    *Previously complained of left preauricular minimally enlarged node.  Was following with Dr. Tin Ordoñez of ENT.  The node did not feel malignant my exam.  Dr. Ordoñez, last record I have for review is 4/6/18.    *Her mother passed away September 2021.  She is dealing with this loss.    Plan  · MD GARCIA 1 year (because she is over 5 years out from triple negative breast cancer, this is likely cured)  · Her port has been removed.   · Last mammogram, 6/8/2021: BI-RADS 2    31 minutes.  Total time.  Same day.

## 2022-03-18 RX ORDER — ALBUTEROL SULFATE 90 UG/1
AEROSOL, METERED RESPIRATORY (INHALATION)
Qty: 90 G | Refills: 3 | Status: SHIPPED | OUTPATIENT
Start: 2022-03-18

## 2022-03-18 RX ORDER — AMLODIPINE BESYLATE 5 MG/1
TABLET ORAL
Qty: 90 TABLET | Refills: 3 | Status: SHIPPED | OUTPATIENT
Start: 2022-03-18

## 2022-04-20 ENCOUNTER — TELEPHONE (OUTPATIENT)
Dept: INTERNAL MEDICINE | Facility: CLINIC | Age: 57
End: 2022-04-20

## 2022-04-20 NOTE — TELEPHONE ENCOUNTER
Caller: Meenakshi Mae    Relationship: Self    Best call back number: [phone]     What is the best time to reach you: ANY     Who are you requesting to speak with (clinical staff, provider,  specific staff member): CLINICAL STAFF     Do you know the name of the person who called: PATIENT     What was the call regarding: PATIENT STATED HER ANXIETY IS INCREASING AND WANTS TO KNOW IF THEIR IS ANY ANXIETY MEDICATION SHE CAN DOUBLE UP ON OR INCREASE DOSAGE ? PLEASE CALL AND ADVISE.      Do you require a callback: YES

## 2022-04-22 ENCOUNTER — OFFICE VISIT (OUTPATIENT)
Dept: INTERNAL MEDICINE | Facility: CLINIC | Age: 57
End: 2022-04-22

## 2022-04-22 VITALS
SYSTOLIC BLOOD PRESSURE: 122 MMHG | HEART RATE: 80 BPM | WEIGHT: 165 LBS | HEIGHT: 66 IN | DIASTOLIC BLOOD PRESSURE: 70 MMHG | BODY MASS INDEX: 26.52 KG/M2

## 2022-04-22 DIAGNOSIS — F41.9 ANXIETY: ICD-10-CM

## 2022-04-22 DIAGNOSIS — E55.9 VITAMIN D DEFICIENCY: ICD-10-CM

## 2022-04-22 DIAGNOSIS — F32.A DEPRESSION, UNSPECIFIED DEPRESSION TYPE: Primary | ICD-10-CM

## 2022-04-22 DIAGNOSIS — E53.8 VITAMIN B12 DEFICIENCY: ICD-10-CM

## 2022-04-22 DIAGNOSIS — Z13.21 ENCOUNTER FOR VITAMIN DEFICIENCY SCREENING: ICD-10-CM

## 2022-04-22 PROCEDURE — 99214 OFFICE O/P EST MOD 30 MIN: CPT | Performed by: INTERNAL MEDICINE

## 2022-04-22 RX ORDER — BUPROPION HYDROCHLORIDE 150 MG/1
150 TABLET ORAL DAILY
Qty: 90 TABLET | Refills: 1 | Status: SHIPPED | OUTPATIENT
Start: 2022-04-22 | End: 2022-09-26

## 2022-04-22 NOTE — PROGRESS NOTES
Chief Complaint   Patient presents with   • Anxiety   • Depression       History of Present Illness   Meenakshi Mae is a 56 y.o. female presents for follow up evaluation. She is struggling w/ anxiety. She notes that she feels increased grief as she approaches mothers day. She feels lonely. Missing her mother who passed away 7 months ago. She is taking paroxetine daily.   Previously w/ grief therapist. Difficulty scheduling w/ therapist. She notes that initially she had good benefit with paroxetine. Now it is not as beneficial. She is carrying strong karma.               The following portions of the patient's history were reviewed and updated as appropriate: allergies, current medications, past family history, past medical history, past social history, past surgical history and problem list.  Current Outpatient Medications on File Prior to Visit   Medication Sig Dispense Refill   • albuterol sulfate  (90 Base) MCG/ACT inhaler INHALE 2 PUFFS BY MOUTH EVERY 4 HOURS AS NEEDED FOR WHEEZE 90 g 3   • amLODIPine (NORVASC) 5 MG tablet TAKE 1 TABLET BY MOUTH EVERY DAY 90 tablet 3   • clonazePAM (KlonoPIN) 0.5 MG tablet Take 1 tablet by mouth 2 (Two) Times a Day As Needed for Anxiety. 60 tablet 2   • cyclobenzaprine (FLEXERIL) 10 MG tablet TAKE 1 TABLET BY MOUTH TWICE A DAY AS NEEDED MUSCLE SPASMS 30 tablet 4   • DEXILANT 60 MG capsule TAKE 1 CAPSULE BY MOUTH EVERY DAY 90 capsule 0   • diclofenac (VOLTAREN) 1 % gel gel APPLY 4 GRAMS TO AFFECTED AREA(S) FOUR TIMES A DAY 1 tube 4   • eszopiclone (Lunesta) 2 MG tablet Take 1 tablet by mouth Every Night. Take immediately before bedtime 30 tablet 5   • fluticasone (Flovent Diskus) 50 MCG/BLIST diskus inhaler Inhale 1 puff 2 (Two) Times a Day. 1 each 12   • montelukast (SINGULAIR) 10 MG tablet Take 1 tablet by mouth every night at bedtime. 90 tablet 3   • PARoxetine (PAXIL) 40 MG tablet Take 1 tablet by mouth Every Morning. 90 tablet 3   • rosuvastatin (CRESTOR) 10 MG  tablet Take 1 tablet by mouth Daily. 90 tablet 3   • traZODone (DESYREL) 50 MG tablet Take 100 mg by mouth every night at bedtime.     • vitamin B-12 (CYANOCOBALAMIN) 1000 MCG tablet Take 1 tablet by mouth Daily. 90 tablet 3   • [DISCONTINUED] clonazePAM (KlonoPIN) 0.5 MG tablet      • [DISCONTINUED] cyclobenzaprine (FLEXERIL) 10 MG tablet      • [DISCONTINUED] eszopiclone (LUNESTA) 2 MG tablet      • [DISCONTINUED] PARoxetine (PAXIL) 40 MG tablet        No current facility-administered medications on file prior to visit.     Review of Systems   Constitutional: Negative.    HENT: Negative.    Eyes: Negative.    Respiratory: Negative.    Cardiovascular: Negative.    Gastrointestinal: Negative.    Endocrine: Negative.    Genitourinary: Negative.    Musculoskeletal: Negative.    Allergic/Immunologic: Negative.    Neurological: Negative.    Hematological: Negative.    Psychiatric/Behavioral: Negative.        Objective   Physical Exam  Vitals and nursing note reviewed.   Constitutional:       Appearance: Normal appearance. She is well-developed.   HENT:      Head: Normocephalic and atraumatic.      Right Ear: Tympanic membrane and external ear normal.      Left Ear: Tympanic membrane and external ear normal.      Nose: Nose normal.      Mouth/Throat:      Mouth: Mucous membranes are moist.   Eyes:      Extraocular Movements: Extraocular movements intact.      Pupils: Pupils are equal, round, and reactive to light.   Cardiovascular:      Rate and Rhythm: Normal rate and regular rhythm.      Pulses: Normal pulses.      Heart sounds: Normal heart sounds.   Pulmonary:      Effort: Pulmonary effort is normal. No respiratory distress.      Breath sounds: Normal breath sounds.   Abdominal:      General: Abdomen is flat.      Palpations: Abdomen is soft.   Musculoskeletal:         General: Normal range of motion.      Cervical back: Normal range of motion and neck supple.   Skin:     General: Skin is warm and dry.  "  Neurological:      General: No focal deficit present.      Mental Status: She is alert and oriented to person, place, and time.   Psychiatric:         Mood and Affect: Mood normal.         Behavior: Behavior normal.         Thought Content: Thought content normal.         Judgment: Judgment normal.          /70   Pulse 80   Ht 167.6 cm (66\")   Wt 74.8 kg (165 lb)   BMI 26.63 kg/m²     Assessment/Plan   Diagnoses and all orders for this visit:    Depression, unspecified depression type    Anxiety    Other orders  -     buPROPion XL (Wellbutrin XL) 150 MG 24 hr tablet; Take 1 tablet by mouth Daily.        Patient w/ anxiety and depression. She will continue once daily paroxetine. Will add wellbutrin xl one tablet daily. She will follow up w/ her  and is to meet w/ a therapist as well. She will have listed labs. She may continue lunesta at night as needed. She will follow up here in 6 weeks or prn.          Answers for HPI/ROS submitted by the patient on 4/20/2022  Please describe your symptoms.: Deep depression  Have you had these symptoms before?: Yes  How long have you been having these symptoms?: 5-7 days  Please list any medications you are currently taking for this condition.: Clonazepam  Please describe any probable cause for these symptoms. : Death!  What is the primary reason for your visit?: Other      "

## 2022-04-23 LAB
25(OH)D3+25(OH)D2 SERPL-MCNC: 18.9 NG/ML (ref 30–100)
ALBUMIN SERPL-MCNC: 4.8 G/DL (ref 3.8–4.9)
ALBUMIN/GLOB SERPL: 2 {RATIO} (ref 1.2–2.2)
ALP SERPL-CCNC: 134 IU/L (ref 44–121)
ALT SERPL-CCNC: 29 IU/L (ref 0–32)
AST SERPL-CCNC: 30 IU/L (ref 0–40)
BASOPHILS # BLD AUTO: 0.1 X10E3/UL (ref 0–0.2)
BASOPHILS NFR BLD AUTO: 1 %
BILIRUB SERPL-MCNC: 0.4 MG/DL (ref 0–1.2)
BUN SERPL-MCNC: 9 MG/DL (ref 6–24)
BUN/CREAT SERPL: 12 (ref 9–23)
CALCIUM SERPL-MCNC: 10.1 MG/DL (ref 8.7–10.2)
CHLORIDE SERPL-SCNC: 101 MMOL/L (ref 96–106)
CHOLEST SERPL-MCNC: 152 MG/DL (ref 100–199)
CO2 SERPL-SCNC: 25 MMOL/L (ref 20–29)
CREAT SERPL-MCNC: 0.73 MG/DL (ref 0.57–1)
EGFRCR SERPLBLD CKD-EPI 2021: 95 ML/MIN/1.73
EOSINOPHIL # BLD AUTO: 0.1 X10E3/UL (ref 0–0.4)
EOSINOPHIL NFR BLD AUTO: 2 %
ERYTHROCYTE [DISTWIDTH] IN BLOOD BY AUTOMATED COUNT: 13.5 % (ref 11.7–15.4)
GLOBULIN SER CALC-MCNC: 2.4 G/DL (ref 1.5–4.5)
GLUCOSE SERPL-MCNC: 86 MG/DL (ref 65–99)
HCT VFR BLD AUTO: 40.4 % (ref 34–46.6)
HDLC SERPL-MCNC: 81 MG/DL
HGB BLD-MCNC: 12.6 G/DL (ref 11.1–15.9)
IMM GRANULOCYTES # BLD AUTO: 0 X10E3/UL (ref 0–0.1)
IMM GRANULOCYTES NFR BLD AUTO: 0 %
LDLC SERPL CALC-MCNC: 58 MG/DL (ref 0–99)
LDLC/HDLC SERPL: 0.7 RATIO (ref 0–3.2)
LYMPHOCYTES # BLD AUTO: 2.4 X10E3/UL (ref 0.7–3.1)
LYMPHOCYTES NFR BLD AUTO: 44 %
MCH RBC QN AUTO: 26.4 PG (ref 26.6–33)
MCHC RBC AUTO-ENTMCNC: 31.2 G/DL (ref 31.5–35.7)
MCV RBC AUTO: 85 FL (ref 79–97)
MONOCYTES # BLD AUTO: 0.5 X10E3/UL (ref 0.1–0.9)
MONOCYTES NFR BLD AUTO: 9 %
NEUTROPHILS # BLD AUTO: 2.4 X10E3/UL (ref 1.4–7)
NEUTROPHILS NFR BLD AUTO: 44 %
PLATELET # BLD AUTO: 305 X10E3/UL (ref 150–450)
POTASSIUM SERPL-SCNC: 4.8 MMOL/L (ref 3.5–5.2)
PROT SERPL-MCNC: 7.2 G/DL (ref 6–8.5)
RBC # BLD AUTO: 4.77 X10E6/UL (ref 3.77–5.28)
SODIUM SERPL-SCNC: 140 MMOL/L (ref 134–144)
TRIGL SERPL-MCNC: 63 MG/DL (ref 0–149)
TSH SERPL DL<=0.005 MIU/L-ACNC: 0.83 UIU/ML (ref 0.45–4.5)
VIT B12 SERPL-MCNC: 1349 PG/ML (ref 232–1245)
VLDLC SERPL CALC-MCNC: 13 MG/DL (ref 5–40)
WBC # BLD AUTO: 5.5 X10E3/UL (ref 3.4–10.8)

## 2022-04-27 RX ORDER — TRAZODONE HYDROCHLORIDE 50 MG/1
100 TABLET ORAL
Qty: 180 TABLET | Refills: 1 | Status: SHIPPED | OUTPATIENT
Start: 2022-04-27 | End: 2022-10-31 | Stop reason: SDUPTHER

## 2022-04-29 DIAGNOSIS — R74.8 ELEVATED ALKALINE PHOSPHATASE LEVEL: Primary | ICD-10-CM

## 2022-04-29 RX ORDER — MULTIVIT-MIN/IRON/FOLIC ACID/K 18-600-40
2000 CAPSULE ORAL DAILY
Qty: 90 CAPSULE | Refills: 3 | Status: SHIPPED | OUTPATIENT
Start: 2022-04-29

## 2022-05-11 ENCOUNTER — TRANSCRIBE ORDERS (OUTPATIENT)
Dept: ADMINISTRATIVE | Facility: HOSPITAL | Age: 57
End: 2022-05-11

## 2022-05-11 DIAGNOSIS — Z12.31 ENCOUNTER FOR SCREENING MAMMOGRAM FOR MALIGNANT NEOPLASM OF BREAST: Primary | ICD-10-CM

## 2022-05-27 ENCOUNTER — OFFICE VISIT (OUTPATIENT)
Dept: INTERNAL MEDICINE | Facility: CLINIC | Age: 57
End: 2022-05-27

## 2022-05-27 VITALS
HEART RATE: 90 BPM | HEIGHT: 66 IN | TEMPERATURE: 98 F | BODY MASS INDEX: 26.52 KG/M2 | WEIGHT: 165 LBS | SYSTOLIC BLOOD PRESSURE: 120 MMHG | OXYGEN SATURATION: 98 % | DIASTOLIC BLOOD PRESSURE: 84 MMHG

## 2022-05-27 DIAGNOSIS — J30.9 ALLERGIC RHINITIS, UNSPECIFIED SEASONALITY, UNSPECIFIED TRIGGER: ICD-10-CM

## 2022-05-27 DIAGNOSIS — J45.20 MILD INTERMITTENT REACTIVE AIRWAY DISEASE WITHOUT COMPLICATION: ICD-10-CM

## 2022-05-27 DIAGNOSIS — J01.00 ACUTE NON-RECURRENT MAXILLARY SINUSITIS: Primary | ICD-10-CM

## 2022-05-27 PROCEDURE — 99213 OFFICE O/P EST LOW 20 MIN: CPT | Performed by: INTERNAL MEDICINE

## 2022-05-27 RX ORDER — FLUTICASONE PROPIONATE 50 MCG
2 SPRAY, SUSPENSION (ML) NASAL DAILY
Qty: 16 G | Refills: 5 | Status: SHIPPED | OUTPATIENT
Start: 2022-05-27 | End: 2022-11-28

## 2022-05-27 RX ORDER — LORATADINE 10 MG/1
10 TABLET ORAL DAILY
Qty: 90 TABLET | Refills: 3 | Status: SHIPPED | OUTPATIENT
Start: 2022-05-27

## 2022-05-27 RX ORDER — MONTELUKAST SODIUM 10 MG/1
10 TABLET ORAL NIGHTLY
Qty: 90 TABLET | Refills: 3 | Status: SHIPPED | OUTPATIENT
Start: 2022-05-27

## 2022-05-27 NOTE — PROGRESS NOTES
"Chief Complaint   Patient presents with   • URI   • Asthma   • Sinusitis       History of Present Illness   Meenakshi Mae is a 56 y.o. female presents for acute care. Patient has been experiencing cough and congestion for last 2 weeks. She notes that this morning she feels improvement. She has been taking mucinex and cough drops otc for symptoms. She does note seasonal allergies. She takes benadryl otc prn for this. She has an albuterol inhaler \"omg its been my best friend\". She has been using this 5 times a day for tightness now 3 times a day. Prior to this onset she was using it 1-2 times weekly.   Patient struggles w/ depression and grief. She reports that she is now at a time of acceptance of losing her mother.            The following portions of the patient's history were reviewed and updated as appropriate: allergies, current medications, past family history, past medical history, past social history, past surgical history and problem list.  Current Outpatient Medications on File Prior to Visit   Medication Sig Dispense Refill   • albuterol sulfate  (90 Base) MCG/ACT inhaler INHALE 2 PUFFS BY MOUTH EVERY 4 HOURS AS NEEDED FOR WHEEZE 90 g 3   • amLODIPine (NORVASC) 5 MG tablet TAKE 1 TABLET BY MOUTH EVERY DAY 90 tablet 3   • buPROPion XL (Wellbutrin XL) 150 MG 24 hr tablet Take 1 tablet by mouth Daily. 90 tablet 1   • Cholecalciferol (Vitamin D) 50 MCG (2000 UT) capsule Take 1 capsule by mouth Daily. 90 capsule 3   • clonazePAM (KlonoPIN) 0.5 MG tablet Take 1 tablet by mouth 2 (Two) Times a Day As Needed for Anxiety. 60 tablet 2   • cyclobenzaprine (FLEXERIL) 10 MG tablet TAKE 1 TABLET BY MOUTH TWICE A DAY AS NEEDED MUSCLE SPASMS 30 tablet 4   • DEXILANT 60 MG capsule TAKE 1 CAPSULE BY MOUTH EVERY DAY 90 capsule 0   • diclofenac (VOLTAREN) 1 % gel gel APPLY 4 GRAMS TO AFFECTED AREA(S) FOUR TIMES A DAY 1 tube 4   • eszopiclone (Lunesta) 2 MG tablet Take 1 tablet by mouth Every Night. Take immediately " before bedtime 30 tablet 5   • fluticasone (Flovent Diskus) 50 MCG/BLIST diskus inhaler Inhale 1 puff 2 (Two) Times a Day. 1 each 12   • PARoxetine (PAXIL) 40 MG tablet Take 1 tablet by mouth Every Morning. 90 tablet 3   • rosuvastatin (CRESTOR) 10 MG tablet Take 1 tablet by mouth Daily. 90 tablet 3   • traZODone (DESYREL) 50 MG tablet Take 2 tablets by mouth every night at bedtime. 180 tablet 1   • vitamin B-12 (CYANOCOBALAMIN) 1000 MCG tablet Take 1 tablet by mouth Daily. 90 tablet 3   • [DISCONTINUED] montelukast (SINGULAIR) 10 MG tablet Take 1 tablet by mouth every night at bedtime. 90 tablet 3   • [DISCONTINUED] fluticasone (FLONASE) 50 MCG/ACT nasal spray 2 sprays into each nostril Daily for 30 days. Administer 2 sprays in each nostril for each dose. 1 bottle 11     No current facility-administered medications on file prior to visit.     Review of Systems   Constitutional: Negative.    HENT: Positive for postnasal drip, rhinorrhea, sinus pain and sore throat.    Eyes: Negative.    Respiratory: Positive for shortness of breath and wheezing.    Cardiovascular: Negative.    Gastrointestinal: Negative.    Endocrine: Negative.    Genitourinary: Negative.    Musculoskeletal: Negative.    Skin: Negative.    Neurological: Negative.    Hematological: Negative.    Psychiatric/Behavioral: Negative.        Objective   Physical Exam  Constitutional:       Appearance: Normal appearance. She is well-developed.   HENT:      Head: Normocephalic and atraumatic.      Right Ear: Hearing, tympanic membrane and external ear normal.      Left Ear: Hearing, tympanic membrane and external ear normal.      Nose: Nose normal.      Mouth/Throat:      Mouth: Mucous membranes are moist.   Eyes:      Extraocular Movements: Extraocular movements intact.   Neck:      Thyroid: No thyromegaly.   Cardiovascular:      Rate and Rhythm: Normal rate and regular rhythm.      Heart sounds: Normal heart sounds. No murmur heard.  Pulmonary:      Effort:  "Pulmonary effort is normal.      Breath sounds: Normal breath sounds.   Chest:   Breasts:      Right: No mass.      Left: No mass.       Abdominal:      General: Abdomen is flat. There is no distension.      Palpations: Abdomen is soft.      Tenderness: There is no abdominal tenderness.   Musculoskeletal:      Cervical back: Neck supple.   Lymphadenopathy:      Cervical: No cervical adenopathy.   Skin:     General: Skin is warm and dry.   Neurological:      General: No focal deficit present.      Mental Status: She is alert and oriented to person, place, and time.   Psychiatric:         Mood and Affect: Mood normal.         Speech: Speech normal.         Behavior: Behavior normal.         Thought Content: Thought content normal.         Judgment: Judgment normal.          /84   Pulse 90   Temp 98 °F (36.7 °C)   Ht 167.6 cm (66\")   Wt 74.8 kg (165 lb)   SpO2 98%   BMI 26.63 kg/m²     Assessment & Plan   Diagnoses and all orders for this visit:    Acute non-recurrent maxillary sinusitis    Allergic rhinitis, unspecified seasonality, unspecified trigger  -     Ambulatory Referral to Allergy    Mild intermittent reactive airway disease without complication  -     Ambulatory Referral to Allergy    Other orders  -     fluticasone (Flonase) 50 MCG/ACT nasal spray; 2 sprays into the nostril(s) as directed by provider Daily.  -     montelukast (Singulair) 10 MG tablet; Take 1 tablet by mouth Every Night.  -     loratadine (Claritin) 10 MG tablet; Take 1 tablet by mouth Daily.    patient with allergic rhinitis. She will start daily singulair, claritin, and flonase. She will use nasal saline rinse when outside. She will be referred to allergist for pft given increased frequency of albuterol usage. She will continue to work w/ grief counselor. She will f/u routinely or if symptoms worsen.              "

## 2022-06-17 RX ORDER — CYCLOBENZAPRINE HCL 10 MG
TABLET ORAL
Qty: 30 TABLET | Refills: 4 | Status: SHIPPED | OUTPATIENT
Start: 2022-06-17 | End: 2022-08-26 | Stop reason: SDUPTHER

## 2022-06-22 ENCOUNTER — HOSPITAL ENCOUNTER (OUTPATIENT)
Dept: MAMMOGRAPHY | Facility: HOSPITAL | Age: 57
Discharge: HOME | End: 2022-06-22

## 2022-06-22 ENCOUNTER — TELEPHONE (OUTPATIENT)
Dept: INTERNAL MEDICINE | Facility: CLINIC | Age: 57
End: 2022-06-22

## 2022-06-22 DIAGNOSIS — Z12.31 ENCOUNTER FOR SCREENING MAMMOGRAM FOR MALIGNANT NEOPLASM OF BREAST: ICD-10-CM

## 2022-06-22 DIAGNOSIS — N63.20 MASS OF LEFT BREAST, UNSPECIFIED QUADRANT: Primary | ICD-10-CM

## 2022-06-22 DIAGNOSIS — N63.42 UNSPECIFIED LUMP IN LEFT BREAST, SUBAREOLAR: ICD-10-CM

## 2022-06-22 NOTE — TELEPHONE ENCOUNTER
Anjali with [facility], [place] mammogram dept calling to request an order for bilateral diagnostic mammogram with ultrasound if needed. For diagnosis of left breast lump. Any questions please call Anjali at [phone].

## 2022-06-24 ENCOUNTER — TELEPHONE (OUTPATIENT)
Dept: INTERNAL MEDICINE | Facility: CLINIC | Age: 57
End: 2022-06-24

## 2022-06-24 NOTE — TELEPHONE ENCOUNTER
Called patient and spoke with her, advised to call  scheduling and see if they have any cancellations before.

## 2022-06-24 NOTE — TELEPHONE ENCOUNTER
Caller: Meenakshi Mae    Relationship to patient: Self    Best call back number: [phone]    Patient is needing: PATIENT IS SCHEDULED FOR A SECOND MAMMOGRAM IN THE MIDDLE OF July, SHE WOULD LIKE TO SEE IF DR. COX CAN GET HER IN ANY SOONER. PLEASE REACH OUT TO PATIENT.

## 2022-07-05 ENCOUNTER — HOSPITAL ENCOUNTER (OUTPATIENT)
Dept: ULTRASOUND IMAGING | Facility: HOSPITAL | Age: 57
Discharge: HOME | End: 2022-07-05

## 2022-07-05 ENCOUNTER — HOSPITAL ENCOUNTER (OUTPATIENT)
Dept: MAMMOGRAPHY | Facility: HOSPITAL | Age: 57
Discharge: HOME | End: 2022-07-05

## 2022-07-05 DIAGNOSIS — N63.20 MASS OF LEFT BREAST, UNSPECIFIED QUADRANT: ICD-10-CM

## 2022-07-05 DIAGNOSIS — N63.42 UNSPECIFIED LUMP IN LEFT BREAST, SUBAREOLAR: ICD-10-CM

## 2022-07-05 PROCEDURE — 77066 DX MAMMO INCL CAD BI: CPT

## 2022-07-05 PROCEDURE — 76642 ULTRASOUND BREAST LIMITED: CPT

## 2022-07-05 PROCEDURE — G0279 TOMOSYNTHESIS, MAMMO: HCPCS

## 2022-07-06 DIAGNOSIS — Z00.00 HEALTHCARE MAINTENANCE: ICD-10-CM

## 2022-07-06 DIAGNOSIS — I10 ESSENTIAL HYPERTENSION: Primary | ICD-10-CM

## 2022-07-11 ENCOUNTER — APPOINTMENT (OUTPATIENT)
Dept: MAMMOGRAPHY | Facility: HOSPITAL | Age: 57
End: 2022-07-11

## 2022-07-11 ENCOUNTER — APPOINTMENT (OUTPATIENT)
Dept: ULTRASOUND IMAGING | Facility: HOSPITAL | Age: 57
End: 2022-07-11

## 2022-07-13 LAB
ALBUMIN SERPL-MCNC: 4.8 G/DL (ref 3.8–4.9)
ALBUMIN/GLOB SERPL: 2.4 {RATIO} (ref 1.2–2.2)
ALP SERPL-CCNC: 115 IU/L (ref 44–121)
ALT SERPL-CCNC: 16 IU/L (ref 0–32)
APPEARANCE UR: CLEAR
AST SERPL-CCNC: 19 IU/L (ref 0–40)
BACTERIA #/AREA URNS HPF: NORMAL /[HPF]
BASOPHILS # BLD AUTO: 0.1 X10E3/UL (ref 0–0.2)
BASOPHILS NFR BLD AUTO: 1 %
BILIRUB SERPL-MCNC: 0.4 MG/DL (ref 0–1.2)
BILIRUB UR QL STRIP: NEGATIVE
BUN SERPL-MCNC: 8 MG/DL (ref 6–24)
BUN/CREAT SERPL: 11 (ref 9–23)
CALCIUM SERPL-MCNC: 9.8 MG/DL (ref 8.7–10.2)
CASTS URNS QL MICRO: NORMAL /LPF
CHLORIDE SERPL-SCNC: 102 MMOL/L (ref 96–106)
CHOLEST SERPL-MCNC: 259 MG/DL (ref 100–199)
CO2 SERPL-SCNC: 25 MMOL/L (ref 20–29)
COLOR UR: YELLOW
CREAT SERPL-MCNC: 0.75 MG/DL (ref 0.57–1)
EGFRCR SERPLBLD CKD-EPI 2021: 92 ML/MIN/1.73
EOSINOPHIL # BLD AUTO: 0.1 X10E3/UL (ref 0–0.4)
EOSINOPHIL NFR BLD AUTO: 3 %
EPI CELLS #/AREA URNS HPF: NORMAL /HPF (ref 0–10)
ERYTHROCYTE [DISTWIDTH] IN BLOOD BY AUTOMATED COUNT: 14.3 % (ref 11.7–15.4)
GLOBULIN SER CALC-MCNC: 2 G/DL (ref 1.5–4.5)
GLUCOSE SERPL-MCNC: 110 MG/DL (ref 65–99)
GLUCOSE UR QL STRIP: NEGATIVE
HCT VFR BLD AUTO: 38.8 % (ref 34–46.6)
HDLC SERPL-MCNC: 58 MG/DL
HGB BLD-MCNC: 12.3 G/DL (ref 11.1–15.9)
HGB UR QL STRIP: ABNORMAL
IMM GRANULOCYTES # BLD AUTO: 0 X10E3/UL (ref 0–0.1)
IMM GRANULOCYTES NFR BLD AUTO: 0 %
KETONES UR QL STRIP: NEGATIVE
LDLC SERPL CALC-MCNC: 171 MG/DL (ref 0–99)
LEUKOCYTE ESTERASE UR QL STRIP: NEGATIVE
LYMPHOCYTES # BLD AUTO: 2.1 X10E3/UL (ref 0.7–3.1)
LYMPHOCYTES NFR BLD AUTO: 40 %
MCH RBC QN AUTO: 27 PG (ref 26.6–33)
MCHC RBC AUTO-ENTMCNC: 31.7 G/DL (ref 31.5–35.7)
MCV RBC AUTO: 85 FL (ref 79–97)
MICRO URNS: ABNORMAL
MONOCYTES # BLD AUTO: 0.5 X10E3/UL (ref 0.1–0.9)
MONOCYTES NFR BLD AUTO: 9 %
NEUTROPHILS # BLD AUTO: 2.5 X10E3/UL (ref 1.4–7)
NEUTROPHILS NFR BLD AUTO: 47 %
NITRITE UR QL STRIP: NEGATIVE
PH UR STRIP: 6 [PH] (ref 5–7.5)
PLATELET # BLD AUTO: 288 X10E3/UL (ref 150–450)
POTASSIUM SERPL-SCNC: 4.3 MMOL/L (ref 3.5–5.2)
PROT SERPL-MCNC: 6.8 G/DL (ref 6–8.5)
PROT UR QL STRIP: NEGATIVE
RBC # BLD AUTO: 4.55 X10E6/UL (ref 3.77–5.28)
RBC #/AREA URNS HPF: NORMAL /HPF (ref 0–2)
SODIUM SERPL-SCNC: 140 MMOL/L (ref 134–144)
SP GR UR STRIP: 1.01 (ref 1–1.03)
TRIGL SERPL-MCNC: 163 MG/DL (ref 0–149)
TSH SERPL DL<=0.005 MIU/L-ACNC: 1.29 UIU/ML (ref 0.45–4.5)
URINALYSIS REFLEX: ABNORMAL
UROBILINOGEN UR STRIP-MCNC: 0.2 MG/DL (ref 0.2–1)
VLDLC SERPL CALC-MCNC: 30 MG/DL (ref 5–40)
WBC # BLD AUTO: 5.3 X10E3/UL (ref 3.4–10.8)
WBC #/AREA URNS HPF: NORMAL /HPF (ref 0–5)

## 2022-07-14 ENCOUNTER — APPOINTMENT (OUTPATIENT)
Dept: MAMMOGRAPHY | Facility: HOSPITAL | Age: 57
End: 2022-07-14

## 2022-07-14 ENCOUNTER — APPOINTMENT (OUTPATIENT)
Dept: ULTRASOUND IMAGING | Facility: HOSPITAL | Age: 57
End: 2022-07-14

## 2022-07-18 DIAGNOSIS — F41.9 ANXIETY: ICD-10-CM

## 2022-07-18 DIAGNOSIS — G47.00 INSOMNIA, UNSPECIFIED TYPE: ICD-10-CM

## 2022-07-18 RX ORDER — ESZOPICLONE 2 MG/1
2 TABLET, FILM COATED ORAL NIGHTLY
Qty: 30 TABLET | Refills: 3 | Status: SHIPPED | OUTPATIENT
Start: 2022-07-18 | End: 2022-10-31 | Stop reason: ALTCHOICE

## 2022-07-18 RX ORDER — CLONAZEPAM 0.5 MG/1
TABLET ORAL
Qty: 60 TABLET | Refills: 1 | Status: SHIPPED | OUTPATIENT
Start: 2022-07-18 | End: 2023-03-13

## 2022-07-19 ENCOUNTER — OFFICE VISIT (OUTPATIENT)
Dept: INTERNAL MEDICINE | Facility: CLINIC | Age: 57
End: 2022-07-19

## 2022-07-19 VITALS
BODY MASS INDEX: 26.2 KG/M2 | DIASTOLIC BLOOD PRESSURE: 78 MMHG | HEART RATE: 90 BPM | HEIGHT: 66 IN | SYSTOLIC BLOOD PRESSURE: 120 MMHG | WEIGHT: 163 LBS

## 2022-07-19 DIAGNOSIS — I10 ESSENTIAL HYPERTENSION: ICD-10-CM

## 2022-07-19 DIAGNOSIS — Z00.00 HEALTHCARE MAINTENANCE: Primary | ICD-10-CM

## 2022-07-19 DIAGNOSIS — R73.01 IFG (IMPAIRED FASTING GLUCOSE): ICD-10-CM

## 2022-07-19 DIAGNOSIS — R06.83 SNORING: ICD-10-CM

## 2022-07-19 DIAGNOSIS — E78.2 MIXED HYPERLIPIDEMIA: ICD-10-CM

## 2022-07-19 DIAGNOSIS — R53.83 OTHER FATIGUE: ICD-10-CM

## 2022-07-19 PROCEDURE — 99213 OFFICE O/P EST LOW 20 MIN: CPT | Performed by: INTERNAL MEDICINE

## 2022-07-19 PROCEDURE — 90714 TD VACC NO PRESV 7 YRS+ IM: CPT | Performed by: INTERNAL MEDICINE

## 2022-07-19 PROCEDURE — 96160 PT-FOCUSED HLTH RISK ASSMT: CPT | Performed by: INTERNAL MEDICINE

## 2022-07-19 PROCEDURE — 1159F MED LIST DOCD IN RCRD: CPT | Performed by: INTERNAL MEDICINE

## 2022-07-19 PROCEDURE — 1170F FXNL STATUS ASSESSED: CPT | Performed by: INTERNAL MEDICINE

## 2022-07-19 PROCEDURE — 90471 IMMUNIZATION ADMIN: CPT | Performed by: INTERNAL MEDICINE

## 2022-07-19 PROCEDURE — G0439 PPPS, SUBSEQ VISIT: HCPCS | Performed by: INTERNAL MEDICINE

## 2022-07-19 RX ORDER — ROSUVASTATIN CALCIUM 20 MG/1
20 TABLET, COATED ORAL DAILY
Qty: 90 TABLET | Refills: 1 | Status: SHIPPED | OUTPATIENT
Start: 2022-07-19 | End: 2022-08-26 | Stop reason: SDUPTHER

## 2022-07-19 NOTE — PROGRESS NOTES
Subjective   AWV   Fatigue  Polyarthralgia    Meenakshi Mae is a 57 y.o. female who presents for an AWV, to review chronic issues, and to discuss acute needs. Patient reports fatigue. She notes that she is in bed for about 8 hours a night. However, she does snore heavily. She notes that if she sleeps more she can feel more energy. She notes good exercise tolerance. She takes lunesta for sleep w/ benefit. She has tried to reduce/ come off of med but can not sleep well without this med.   Has clonazepam prn anxiety. She does note that she often takes this most mornings. She is also on paroxetine and buproprion for anxiety and depression. She has had one telehealth session only.   She has arthralgia. She notes this is much improved with cayenne, lemon, and maple syrup w/ water intake daily.   Patient has impaired fasting glucose. She notes that she has been drinking flavored mary and brandies as well as many desserts recently.   Has hyperlipidemia. Patient has much elevated ldl. Reduced hdl. She reports compliance with crestor.     She has not been hospitalized in last one year. Does not need daily aspirin.   Feels her physical and mental health are improved from last year.               Review of Systems   Constitutional: Positive for fatigue.   HENT: Negative.    Eyes: Negative.    Respiratory: Negative.    Cardiovascular: Negative.    Gastrointestinal: Negative.    Endocrine: Negative.    Genitourinary: Negative.    Musculoskeletal: Negative.    Allergic/Immunologic: Negative.    Neurological: Negative.    Hematological: Negative.    Psychiatric/Behavioral: Negative.        The following portions of the patient's history were reviewed and updated as appropriate: allergies, current medications, past family history, past medical history, past social history, past surgical history and problem list.     Patient Active Problem List   Diagnosis   • Chronic constipation   • Gastroesophageal reflux disease with  esophagitis   • Anxiety   • Essential hypertension   • Malignant neoplasm of upper-outer quadrant of right female breast (HCC)   • Atopic rhinitis   • Depression   • Hematuria   • Neuropathy   • Mild intermittent asthma without complication   • Vitamin D deficiency   • Healthcare maintenance   • Osteopenia   • Bulge of lumbar disc without myelopathy   • Lumbar facet arthropathy   • Synovial cyst of lumbar facet joint       Past Medical History:   Diagnosis Date   • Anemia    • Depression    • H/O transfusion of packed red blood cells    • Headache    • History of low back pain    • Malignant neoplasm of breast (HCC) 2013    Right, T2N1M0, Stage IIB       Past Surgical History:   Procedure Laterality Date   • BREAST BIOPSY     • HAND SURGERY Left 2015   • HYSTERECTOMY  2008   • MASTECTOMY MODIFIED RADICAL Right 02/21/2013       Family History   Problem Relation Age of Onset   • Breast cancer Mother    • Heart disease Mother    • Hypertension Mother    • Stomach cancer Father    • Ovarian cancer Maternal Aunt    • Stomach cancer Maternal Aunt        Social History     Socioeconomic History   • Marital status:    • Years of education: College   Tobacco Use   • Smoking status: Former Smoker     Packs/day: 1.00     Years: 6.00     Pack years: 6.00     Types: Cigarettes   • Smokeless tobacco: Former User   • Tobacco comment: Smoked from age [age].   Substance and Sexual Activity   • Alcohol use: Yes     Comment: Occasional   • Drug use: No       Current Outpatient Medications on File Prior to Visit   Medication Sig Dispense Refill   • albuterol sulfate  (90 Base) MCG/ACT inhaler INHALE 2 PUFFS BY MOUTH EVERY 4 HOURS AS NEEDED FOR WHEEZE 90 g 3   • amLODIPine (NORVASC) 5 MG tablet TAKE 1 TABLET BY MOUTH EVERY DAY 90 tablet 3   • buPROPion XL (Wellbutrin XL) 150 MG 24 hr tablet Take 1 tablet by mouth Daily. 90 tablet 1   • Cholecalciferol (Vitamin D) 50 MCG (2000 UT) capsule Take 1 capsule by mouth Daily. 90  "capsule 3   • clonazePAM (KlonoPIN) 0.5 MG tablet TAKE 1 TABLET BY MOUTH 2 TIMES A DAY AS NEEDED FOR ANXIETY. 60 tablet 1   • cyclobenzaprine (FLEXERIL) 10 MG tablet TAKE 1 TABLET BY MOUTH TWICE A DAY AS NEEDED MUSCLE SPASMS 30 tablet 4   • DEXILANT 60 MG capsule TAKE 1 CAPSULE BY MOUTH EVERY DAY 90 capsule 0   • diclofenac (VOLTAREN) 1 % gel gel APPLY 4 GRAMS TO AFFECTED AREA(S) FOUR TIMES A DAY 1 tube 4   • eszopiclone (LUNESTA) 2 MG tablet TAKE 1 TABLET BY MOUTH EVERY NIGHT. TAKE IMMEDIATELY BEFORE BEDTIME 30 tablet 3   • fluticasone (Flonase) 50 MCG/ACT nasal spray 2 sprays into the nostril(s) as directed by provider Daily. 16 g 5   • fluticasone (Flovent Diskus) 50 MCG/BLIST diskus inhaler Inhale 1 puff 2 (Two) Times a Day. 1 each 12   • loratadine (Claritin) 10 MG tablet Take 1 tablet by mouth Daily. 90 tablet 3   • montelukast (Singulair) 10 MG tablet Take 1 tablet by mouth Every Night. 90 tablet 3   • PARoxetine (PAXIL) 40 MG tablet Take 1 tablet by mouth Every Morning. 90 tablet 3   • traZODone (DESYREL) 50 MG tablet Take 2 tablets by mouth every night at bedtime. 180 tablet 1   • vitamin B-12 (CYANOCOBALAMIN) 1000 MCG tablet Take 1 tablet by mouth Daily. 90 tablet 3   • [DISCONTINUED] rosuvastatin (CRESTOR) 10 MG tablet Take 1 tablet by mouth Daily. 90 tablet 3     No current facility-administered medications on file prior to visit.       Allergies   Allergen Reactions   • Erythromycin Hives       Immunization History   Administered Date(s) Administered   • COVID-19 (PFIZER) PURPLE CAP 01/01/2021, 02/01/2021, 11/01/2021   • FluLaval/Fluarix/Fluzone >6 09/10/2020, 10/26/2021   • Flucelvax Quad Vial =>4yrs 11/06/2018   • Hepatitis A 10/05/2018, 07/23/2019   • Influenza, Unspecified 10/18/2019   • Pneumococcal, Unspecified 09/30/2013   • Tdap 09/30/2013   • flucelvax quad pfs =>4 YRS 10/18/2019       Objective     /78   Pulse 90   Ht 167.6 cm (66\")   Wt 73.9 kg (163 lb)   BMI 26.31 kg/m² "     Physical Exam  Vitals and nursing note reviewed.   Constitutional:       Appearance: Normal appearance. She is well-developed.   HENT:      Head: Normocephalic and atraumatic.      Right Ear: Tympanic membrane and external ear normal.      Left Ear: Tympanic membrane and external ear normal.      Nose: Nose normal.      Mouth/Throat:      Mouth: Mucous membranes are moist.   Eyes:      Extraocular Movements: Extraocular movements intact.      Pupils: Pupils are equal, round, and reactive to light.   Cardiovascular:      Rate and Rhythm: Normal rate and regular rhythm.      Pulses: Normal pulses.      Heart sounds: Normal heart sounds.   Pulmonary:      Effort: Pulmonary effort is normal. No respiratory distress.      Breath sounds: Normal breath sounds.   Abdominal:      General: Abdomen is flat.      Palpations: Abdomen is soft.   Musculoskeletal:         General: Normal range of motion.      Cervical back: Normal range of motion and neck supple.   Skin:     General: Skin is warm and dry.   Neurological:      General: No focal deficit present.      Mental Status: She is alert and oriented to person, place, and time.   Psychiatric:         Mood and Affect: Mood normal.         Behavior: Behavior normal.         Thought Content: Thought content normal.         Judgment: Judgment normal.         Assessment & Plan   Diagnoses and all orders for this visit:    1. Healthcare maintenance (Primary)    2. Other fatigue  -     Ambulatory Referral to Sleep Medicine    3. Snoring  -     Ambulatory Referral to Sleep Medicine    4. Essential hypertension    5. Mixed hyperlipidemia    6. IFG (impaired fasting glucose)    Other orders  -     rosuvastatin (Crestor) 20 MG tablet; Take 1 tablet by mouth Daily.  Dispense: 90 tablet; Refill: 1        Discussion    Patient presents today for an AWV.  Patient follows a healthy diet.   Mammogram is up to date.   Colon cancer screening is up to date.   Pap smear no longer performed  secondary to hysterectomy.   Immunizations are up to date.    Patient w/ hyperlipidemia. She will increase crestor to 20 mg daily and will monitor. She will reduce dietary fats and carbs. She will increase physical activity.   She has insomnia. Will refer to sleep med to investigate possible sleep apnea. She is not to drive if having fatigue. Discussed managing anxiety in a non pharmaceutical way as well. She will f/u w/ her specialist routinely.     Discussed fall precautions. Nutrition, fitness, well care w/ patient today.   Total visit 50 min w planning, counseling, chart review.              Future Appointments   Date Time Provider Department Center   3/27/2023 10:30 AM LAB CHAIR 1 [facility]O  [facility]   3/27/2023 11:00 AM Code, Osmin MCCOLLUM II, MD [facility]

## 2022-08-24 ENCOUNTER — APPOINTMENT (OUTPATIENT)
Dept: GENERAL RADIOLOGY | Facility: HOSPITAL | Age: 57
End: 2022-08-24

## 2022-08-24 ENCOUNTER — HOSPITAL ENCOUNTER (EMERGENCY)
Facility: HOSPITAL | Age: 57
Discharge: HOME | End: 2022-08-24
Attending: EMERGENCY MEDICINE | Admitting: EMERGENCY MEDICINE

## 2022-08-24 VITALS
TEMPERATURE: 98.2 F | HEART RATE: 77 BPM | HEIGHT: 68 IN | BODY MASS INDEX: 24.79 KG/M2 | DIASTOLIC BLOOD PRESSURE: 80 MMHG | OXYGEN SATURATION: 100 % | RESPIRATION RATE: 16 BRPM | SYSTOLIC BLOOD PRESSURE: 130 MMHG | WEIGHT: 163.58 LBS

## 2022-08-24 DIAGNOSIS — M54.6 ACUTE LEFT-SIDED THORACIC BACK PAIN: Primary | ICD-10-CM

## 2022-08-24 DIAGNOSIS — R07.89 CHEST TIGHTNESS: ICD-10-CM

## 2022-08-24 LAB
ALBUMIN SERPL-MCNC: 4.7 G/DL (ref 3.5–5.2)
ALBUMIN/GLOB SERPL: 2 G/DL
ALP SERPL-CCNC: 113 U/L (ref 39–117)
ALT SERPL W P-5'-P-CCNC: 20 U/L (ref 1–33)
ANION GAP SERPL CALCULATED.3IONS-SCNC: 10 MMOL/L (ref 5–15)
AST SERPL-CCNC: 19 U/L (ref 1–32)
BASOPHILS # BLD AUTO: 0 10*3/MM3 (ref 0–0.2)
BASOPHILS NFR BLD AUTO: 0.7 % (ref 0–1.5)
BILIRUB SERPL-MCNC: 0.2 MG/DL (ref 0–1.2)
BUN SERPL-MCNC: 14 MG/DL (ref 6–20)
BUN/CREAT SERPL: 21.2 (ref 7–25)
CALCIUM SPEC-SCNC: 10 MG/DL (ref 8.6–10.5)
CHLORIDE SERPL-SCNC: 104 MMOL/L (ref 98–107)
CO2 SERPL-SCNC: 28 MMOL/L (ref 22–29)
CREAT SERPL-MCNC: 0.66 MG/DL (ref 0.57–1)
D DIMER PPP FEU-MCNC: 0.28 MG/L (FEU) (ref 0–0.59)
DEPRECATED RDW RBC AUTO: 41.6 FL (ref 37–54)
EGFRCR SERPLBLD CKD-EPI 2021: 101.2 ML/MIN/1.73
EOSINOPHIL # BLD AUTO: 0.1 10*3/MM3 (ref 0–0.4)
EOSINOPHIL NFR BLD AUTO: 1.3 % (ref 0.3–6.2)
ERYTHROCYTE [DISTWIDTH] IN BLOOD BY AUTOMATED COUNT: 14.3 % (ref 12.3–15.4)
GLOBULIN UR ELPH-MCNC: 2.4 GM/DL
GLUCOSE SERPL-MCNC: 93 MG/DL (ref 65–99)
HCT VFR BLD AUTO: 37.1 % (ref 34–46.6)
HGB BLD-MCNC: 11.8 G/DL (ref 12–15.9)
LIPASE SERPL-CCNC: 30 U/L (ref 13–60)
LYMPHOCYTES # BLD AUTO: 2.8 10*3/MM3 (ref 0.7–3.1)
LYMPHOCYTES NFR BLD AUTO: 44.9 % (ref 19.6–45.3)
MCH RBC QN AUTO: 26.9 PG (ref 26.6–33)
MCHC RBC AUTO-ENTMCNC: 31.9 G/DL (ref 31.5–35.7)
MCV RBC AUTO: 84.5 FL (ref 79–97)
MONOCYTES # BLD AUTO: 0.6 10*3/MM3 (ref 0.1–0.9)
MONOCYTES NFR BLD AUTO: 10.3 % (ref 5–12)
NEUTROPHILS NFR BLD AUTO: 2.7 10*3/MM3 (ref 1.7–7)
NEUTROPHILS NFR BLD AUTO: 42.8 % (ref 42.7–76)
NRBC BLD AUTO-RTO: 0 /100 WBC (ref 0–0.2)
PLATELET # BLD AUTO: 280 10*3/MM3 (ref 140–450)
PMV BLD AUTO: 7.5 FL (ref 6–12)
POTASSIUM SERPL-SCNC: 4.2 MMOL/L (ref 3.5–5.2)
PROT SERPL-MCNC: 7.1 G/DL (ref 6–8.5)
RBC # BLD AUTO: 4.39 10*6/MM3 (ref 3.77–5.28)
SARS-COV-2 RNA PNL SPEC NAA+PROBE: NOT DETECTED
SODIUM SERPL-SCNC: 142 MMOL/L (ref 136–145)
TROPONIN T SERPL-MCNC: <0.01 NG/ML (ref 0–0.03)
WBC NRBC COR # BLD: 6.3 10*3/MM3 (ref 3.4–10.8)

## 2022-08-24 PROCEDURE — 85025 COMPLETE CBC W/AUTO DIFF WBC: CPT | Performed by: EMERGENCY MEDICINE

## 2022-08-24 PROCEDURE — 87635 SARS-COV-2 COVID-19 AMP PRB: CPT | Performed by: EMERGENCY MEDICINE

## 2022-08-24 PROCEDURE — 71045 X-RAY EXAM CHEST 1 VIEW: CPT | Performed by: EMERGENCY MEDICINE

## 2022-08-24 PROCEDURE — 99283 EMERGENCY DEPT VISIT LOW MDM: CPT

## 2022-08-24 PROCEDURE — 80053 COMPREHEN METABOLIC PANEL: CPT | Performed by: EMERGENCY MEDICINE

## 2022-08-24 PROCEDURE — 85379 FIBRIN DEGRADATION QUANT: CPT | Performed by: EMERGENCY MEDICINE

## 2022-08-24 PROCEDURE — 93005 ELECTROCARDIOGRAM TRACING: CPT | Performed by: EMERGENCY MEDICINE

## 2022-08-24 PROCEDURE — 83690 ASSAY OF LIPASE: CPT | Performed by: EMERGENCY MEDICINE

## 2022-08-24 PROCEDURE — 84484 ASSAY OF TROPONIN QUANT: CPT | Performed by: EMERGENCY MEDICINE

## 2022-08-24 RX ORDER — ROSUVASTATIN CALCIUM 10 MG/1
TABLET, COATED ORAL
Qty: 90 TABLET | Refills: 3 | OUTPATIENT
Start: 2022-08-24

## 2022-08-24 RX ORDER — BUPROPION HYDROCHLORIDE 150 MG/1
TABLET ORAL
Qty: 90 TABLET | Refills: 1 | OUTPATIENT
Start: 2022-08-24

## 2022-08-24 RX ORDER — SODIUM CHLORIDE 0.9 % (FLUSH) 0.9 %
10 SYRINGE (ML) INJECTION AS NEEDED
Status: DISCONTINUED | OUTPATIENT
Start: 2022-08-24 | End: 2022-08-24 | Stop reason: HOSPADM

## 2022-08-24 RX ORDER — LANOLIN ALCOHOL/MO/W.PET/CERES
CREAM (GRAM) TOPICAL
Qty: 90 TABLET | Refills: 3 | OUTPATIENT
Start: 2022-08-24

## 2022-08-26 ENCOUNTER — OFFICE VISIT (OUTPATIENT)
Dept: INTERNAL MEDICINE | Facility: CLINIC | Age: 57
End: 2022-08-26

## 2022-08-26 VITALS
HEART RATE: 93 BPM | WEIGHT: 165 LBS | BODY MASS INDEX: 26.52 KG/M2 | SYSTOLIC BLOOD PRESSURE: 138 MMHG | DIASTOLIC BLOOD PRESSURE: 76 MMHG | HEIGHT: 66 IN

## 2022-08-26 DIAGNOSIS — I10 ESSENTIAL HYPERTENSION: ICD-10-CM

## 2022-08-26 DIAGNOSIS — R07.9 CHEST PAIN, UNSPECIFIED TYPE: Primary | ICD-10-CM

## 2022-08-26 DIAGNOSIS — E78.5 HYPERLIPIDEMIA, UNSPECIFIED HYPERLIPIDEMIA TYPE: ICD-10-CM

## 2022-08-26 PROCEDURE — 99214 OFFICE O/P EST MOD 30 MIN: CPT | Performed by: INTERNAL MEDICINE

## 2022-08-26 RX ORDER — ROSUVASTATIN CALCIUM 20 MG/1
20 TABLET, COATED ORAL DAILY
Qty: 90 TABLET | Refills: 1 | Status: SHIPPED | OUTPATIENT
Start: 2022-08-26 | End: 2022-08-26 | Stop reason: DRUGHIGH

## 2022-08-26 RX ORDER — ROSUVASTATIN CALCIUM 10 MG/1
10 TABLET, COATED ORAL DAILY
Qty: 90 TABLET | Refills: 1 | Status: SHIPPED | OUTPATIENT
Start: 2022-08-26

## 2022-08-26 RX ORDER — FAMOTIDINE 40 MG/1
40 TABLET, FILM COATED ORAL
Qty: 90 TABLET | Refills: 3 | Status: SHIPPED | OUTPATIENT
Start: 2022-08-26

## 2022-08-26 RX ORDER — CYCLOBENZAPRINE HCL 10 MG
10 TABLET ORAL 2 TIMES DAILY PRN
Qty: 30 TABLET | Refills: 4 | Status: SHIPPED | OUTPATIENT
Start: 2022-08-26

## 2022-08-26 NOTE — PROGRESS NOTES
"Chief Complaint   Patient presents with   • Hypertension   • Hyperlipidemia   • Back Pain       History of Present Illness   Meenakshi Mae is a 57 y.o. female presents for follow up evaluation. Patient reports she is struggling w/ \"extreme exhaustion\". Additional concern of back pain in the left shoulder blade. She also was having nausea. She went to ER. Was advised to stay but she left to attend to her \"fur baby\". She did have normal chest xray. EKG with no new findings. Activity does not bring on the discomfort. She has been advised to follow up w/ cardiology. She notes that discomfort can occur at rest. She has felt the sensation twice as seated in the office. She did have the sensation when she was on monitor at er as well. She ate coffee and doughnuts prior to coming to office today. [facility] describes this as \"lightning pain\". \"it shoots through and then it's gone\".   Has htn. bp at home normotensive.   Has hyperlipidemia. She was initially not taking med but is taking this now.     The following portions of the patient's history were reviewed and updated as appropriate: allergies, current medications, past family history, past medical history, past social history, past surgical history and problem list.  Current Outpatient Medications on File Prior to Visit   Medication Sig Dispense Refill   • albuterol sulfate  (90 Base) MCG/ACT inhaler INHALE 2 PUFFS BY MOUTH EVERY 4 HOURS AS NEEDED FOR WHEEZE 90 g 3   • amLODIPine (NORVASC) 5 MG tablet TAKE 1 TABLET BY MOUTH EVERY DAY 90 tablet 3   • buPROPion XL (Wellbutrin XL) 150 MG 24 hr tablet Take 1 tablet by mouth Daily. 90 tablet 1   • Cholecalciferol (Vitamin D) 50 MCG (2000 UT) capsule Take 1 capsule by mouth Daily. 90 capsule 3   • clonazePAM (KlonoPIN) 0.5 MG tablet TAKE 1 TABLET BY MOUTH 2 TIMES A DAY AS NEEDED FOR ANXIETY. 60 tablet 1   • eszopiclone (LUNESTA) 2 MG tablet TAKE 1 TABLET BY MOUTH EVERY NIGHT. TAKE IMMEDIATELY BEFORE BEDTIME 30 tablet 3   • " fluticasone (Flonase) 50 MCG/ACT nasal spray 2 sprays into the nostril(s) as directed by provider Daily. 16 g 5   • fluticasone (Flovent Diskus) 50 MCG/BLIST diskus inhaler Inhale 1 puff 2 (Two) Times a Day. 1 each 12   • loratadine (Claritin) 10 MG tablet Take 1 tablet by mouth Daily. 90 tablet 3   • montelukast (Singulair) 10 MG tablet Take 1 tablet by mouth Every Night. 90 tablet 3   • PARoxetine (PAXIL) 40 MG tablet Take 1 tablet by mouth Every Morning. 90 tablet 3   • traZODone (DESYREL) 50 MG tablet Take 2 tablets by mouth every night at bedtime. 180 tablet 1   • vitamin B-12 (CYANOCOBALAMIN) 1000 MCG tablet Take 1 tablet by mouth Daily. 90 tablet 3   • [DISCONTINUED] cyclobenzaprine (FLEXERIL) 10 MG tablet TAKE 1 TABLET BY MOUTH TWICE A DAY AS NEEDED MUSCLE SPASMS 30 tablet 4   • [DISCONTINUED] DEXILANT 60 MG capsule TAKE 1 CAPSULE BY MOUTH EVERY DAY 90 capsule 0   • [DISCONTINUED] rosuvastatin (Crestor) 20 MG tablet Take 1 tablet by mouth Daily. 90 tablet 1   • diclofenac (VOLTAREN) 1 % gel gel APPLY 4 GRAMS TO AFFECTED AREA(S) FOUR TIMES A DAY 1 tube 4     No current facility-administered medications on file prior to visit.     Review of Systems   Constitutional: Negative.    HENT: Negative.    Eyes: Negative.    Respiratory: Negative.    Cardiovascular: Positive for chest pain.   Gastrointestinal: Negative.    Endocrine: Negative.    Genitourinary: Negative.    Musculoskeletal: Positive for back pain.   Allergic/Immunologic: Negative.    Neurological: Negative.    Hematological: Negative.    Psychiatric/Behavioral: Negative.        Objective   Physical Exam  Vitals and nursing note reviewed.   Constitutional:       Appearance: Normal appearance.   HENT:      Head: Normocephalic and atraumatic.      Right Ear: Tympanic membrane normal.      Left Ear: Tympanic membrane normal.      Nose: Nose normal.      Mouth/Throat:      Mouth: Mucous membranes are moist.   Eyes:      Extraocular Movements: Extraocular  "movements intact.      Pupils: Pupils are equal, round, and reactive to light.   Cardiovascular:      Rate and Rhythm: Normal rate and regular rhythm.      Pulses: Normal pulses.      Heart sounds: Normal heart sounds.   Pulmonary:      Effort: Pulmonary effort is normal.      Breath sounds: Normal breath sounds.   Abdominal:      General: Abdomen is flat.      Palpations: Abdomen is soft.   Musculoskeletal:         General: Normal range of motion.      Cervical back: Normal range of motion.   Skin:     General: Skin is warm and dry.   Neurological:      General: No focal deficit present.      Mental Status: She is alert and oriented to person, place, and time.   Psychiatric:         Mood and Affect: Mood normal.         Behavior: Behavior normal.         Thought Content: Thought content normal.         Judgment: Judgment normal.          /76   Pulse 93   Ht 167.6 cm (66\")   Wt 74.8 kg (165 lb)   LMP  (LMP Unknown)   BMI 26.63 kg/m²     Assessment & Plan   Diagnoses and all orders for this visit:    Chest pain, unspecified type  -     Treadmill Stress Test; Future    Essential hypertension  -     Treadmill Stress Test; Future    Hyperlipidemia, unspecified hyperlipidemia type  -     Treadmill Stress Test; Future    Other orders  -     Discontinue: rosuvastatin (Crestor) 20 MG tablet; Take 1 tablet by mouth Daily.  -     cyclobenzaprine (FLEXERIL) 10 MG tablet; Take 1 tablet by mouth 2 (Two) Times a Day As Needed for Muscle Spasms.  -     rosuvastatin (Crestor) 10 MG tablet; Take 1 tablet by mouth Daily.  -     famotidine (PEPCID) 40 MG tablet; Take 1 tablet by mouth every night at bedtime.    patient w/ recent atypical pain in her back. 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 She also has untreated gastritis. Will start pepcid. Dietary modifications. She will get a cardiac stress test. She will remain consistent on crestor. She will f/u in 2 months or prn.              "

## 2022-08-28 LAB — QT INTERVAL: 377 MS

## 2022-09-13 ENCOUNTER — TELEPHONE (OUTPATIENT)
Dept: INTERNAL MEDICINE | Facility: CLINIC | Age: 57
End: 2022-09-13

## 2022-09-13 ENCOUNTER — HOSPITAL ENCOUNTER (OUTPATIENT)
Dept: CARDIOLOGY | Facility: HOSPITAL | Age: 57
Discharge: HOME | End: 2022-09-13
Admitting: INTERNAL MEDICINE

## 2022-09-13 DIAGNOSIS — I10 ESSENTIAL HYPERTENSION: ICD-10-CM

## 2022-09-13 DIAGNOSIS — R07.9 CHEST PAIN, UNSPECIFIED TYPE: ICD-10-CM

## 2022-09-13 DIAGNOSIS — E78.5 HYPERLIPIDEMIA, UNSPECIFIED HYPERLIPIDEMIA TYPE: ICD-10-CM

## 2022-09-13 LAB
BH CV STRESS BP STAGE 1: NORMAL
BH CV STRESS BP STAGE 2: NORMAL
BH CV STRESS DURATION MIN STAGE 1: 3
BH CV STRESS DURATION MIN STAGE 2: 3
BH CV STRESS DURATION SEC STAGE 1: 0
BH CV STRESS DURATION SEC STAGE 2: 0
BH CV STRESS GRADE STAGE 1: 10
BH CV STRESS GRADE STAGE 2: 12
BH CV STRESS HR STAGE 1: 140
BH CV STRESS HR STAGE 2: 162
BH CV STRESS METS STAGE 1: 5
BH CV STRESS METS STAGE 2: 7.5
BH CV STRESS PROTOCOL 1: NORMAL
BH CV STRESS RECOVERY BP: NORMAL MMHG
BH CV STRESS RECOVERY HR: 106 BPM
BH CV STRESS SPEED STAGE 1: 1.7
BH CV STRESS SPEED STAGE 2: 2.5
BH CV STRESS STAGE 1: 1
BH CV STRESS STAGE 2: 2
MAXIMAL PREDICTED HEART RATE: 161 BPM
PERCENT MAX PREDICTED HR: 100.62 %
STRESS BASELINE BP: NORMAL MMHG
STRESS BASELINE HR: 106 BPM
STRESS PERCENT HR: 118 %
STRESS POST ESTIMATED WORKLOAD: 7.5 METS
STRESS POST EXERCISE DUR MIN: 6 MIN
STRESS POST EXERCISE DUR SEC: 0 SEC
STRESS POST PEAK BP: NORMAL MMHG
STRESS POST PEAK HR: 162 BPM
STRESS TARGET HR: 137 BPM

## 2022-09-13 PROCEDURE — 93016 CV STRESS TEST SUPVJ ONLY: CPT | Performed by: INTERNAL MEDICINE

## 2022-09-13 PROCEDURE — 93017 CV STRESS TEST TRACING ONLY: CPT

## 2022-09-13 PROCEDURE — 93018 CV STRESS TEST I&R ONLY: CPT | Performed by: INTERNAL MEDICINE

## 2022-09-13 NOTE — TELEPHONE ENCOUNTER
Pt informed      ----- Message from Vandana Zavaleta MD sent at 9/13/2022 12:49 PM EDT -----  Stress test does not show any heart ischemia. However, exercise tolerance is a little low. Would advise gradually increasing activity. Minimum of 160 min physical activity daily.   JW

## 2022-09-26 RX ORDER — BUPROPION HYDROCHLORIDE 150 MG/1
TABLET ORAL
Qty: 90 TABLET | Refills: 1 | Status: SHIPPED | OUTPATIENT
Start: 2022-09-26

## 2022-09-28 ENCOUNTER — OFFICE VISIT (OUTPATIENT)
Dept: SLEEP MEDICINE | Facility: HOSPITAL | Age: 57
End: 2022-09-28

## 2022-09-28 VITALS
OXYGEN SATURATION: 98 % | DIASTOLIC BLOOD PRESSURE: 76 MMHG | HEART RATE: 87 BPM | WEIGHT: 164 LBS | SYSTOLIC BLOOD PRESSURE: 98 MMHG | HEIGHT: 68 IN | BODY MASS INDEX: 24.86 KG/M2

## 2022-09-28 DIAGNOSIS — G47.10 HYPERSOMNIA: Primary | ICD-10-CM

## 2022-09-28 DIAGNOSIS — R06.81 WITNESSED EPISODE OF APNEA: ICD-10-CM

## 2022-09-28 DIAGNOSIS — R06.83 SNORING: ICD-10-CM

## 2022-09-28 PROCEDURE — G0463 HOSPITAL OUTPT CLINIC VISIT: HCPCS

## 2022-09-28 NOTE — PROGRESS NOTES
[facility]  Telephone: [phone] / Fax: [phone] [place]  Telephone: [phone] / Fax: [phone] Hali Grajeda    Referring Physician: Vandana Zavaleta MD  PCP: Vandana Zavaleta MD    Reason for consult:  sleep apnea    Meenakshi Mae is a 57 y.o.female  was seen in the [facility] today for evaluation of sleep apnea.  She has been dealing with breast cancer in past 10 years. Stage III.  She has had insomnia and recurrent nocurnal arousals. Previous bed partner reported snoring and apneas. She wakes herself up choking. She wakes up feeling rested in the morning. However, she feels tired in the afternoon. She denies significant wt fluctuation. She has anxiety, takes Clonazepam 0.5mg every morning and in the afternoon, Lunesta 2mg to fall asleep, Trazodone 100mg qhs, and Paxil for anxiety. She takes Flexeril to help with joint pains/spasm after radiation. She takes Wellbutrin 150mg for anxiety. She has asthma, and is on albuterol.    SH- retired, social drinker, 2 caffeine per day,    ROS- +nasal congestion, +post nasal drip, +anxiety, +depression    Meenakshi Mae  has a past medical history of Anemia, Depression, H/O transfusion of packed red blood cells, Headache, History of low back pain, and Malignant neoplasm of breast (HCC) (2013).    Current Medications:    Current Outpatient Medications:   •  albuterol sulfate  (90 Base) MCG/ACT inhaler, INHALE 2 PUFFS BY MOUTH EVERY 4 HOURS AS NEEDED FOR WHEEZE, Disp: 90 g, Rfl: 3  •  amLODIPine (NORVASC) 5 MG tablet, TAKE 1 TABLET BY MOUTH EVERY DAY, Disp: 90 tablet, Rfl: 3  •  buPROPion XL (WELLBUTRIN XL) 150 MG 24 hr tablet, TAKE 1 TABLET BY MOUTH EVERY DAY, Disp: 90 tablet, Rfl: 1  •  Cholecalciferol (Vitamin D) 50 MCG (2000 UT) capsule, Take 1 capsule by mouth Daily., Disp: 90 capsule, Rfl: 3  •  clonazePAM (KlonoPIN) 0.5 MG tablet, TAKE 1 TABLET BY MOUTH 2 TIMES A DAY AS NEEDED FOR ANXIETY., Disp: 60 tablet,  "Rfl: 1  •  cyclobenzaprine (FLEXERIL) 10 MG tablet, Take 1 tablet by mouth 2 (Two) Times a Day As Needed for Muscle Spasms., Disp: 30 tablet, Rfl: 4  •  diclofenac (VOLTAREN) 1 % gel gel, APPLY 4 GRAMS TO AFFECTED AREA(S) FOUR TIMES A DAY, Disp: 1 tube, Rfl: 4  •  eszopiclone (LUNESTA) 2 MG tablet, TAKE 1 TABLET BY MOUTH EVERY NIGHT. TAKE IMMEDIATELY BEFORE BEDTIME, Disp: 30 tablet, Rfl: 3  •  famotidine (PEPCID) 40 MG tablet, Take 1 tablet by mouth every night at bedtime., Disp: 90 tablet, Rfl: 3  •  fluticasone (Flonase) 50 MCG/ACT nasal spray, 2 sprays into the nostril(s) as directed by provider Daily., Disp: 16 g, Rfl: 5  •  fluticasone (Flovent Diskus) 50 MCG/BLIST diskus inhaler, Inhale 1 puff 2 (Two) Times a Day., Disp: 1 each, Rfl: 12  •  loratadine (Claritin) 10 MG tablet, Take 1 tablet by mouth Daily., Disp: 90 tablet, Rfl: 3  •  montelukast (Singulair) 10 MG tablet, Take 1 tablet by mouth Every Night., Disp: 90 tablet, Rfl: 3  •  PARoxetine (PAXIL) 40 MG tablet, Take 1 tablet by mouth Every Morning., Disp: 90 tablet, Rfl: 3  •  rosuvastatin (Crestor) 10 MG tablet, Take 1 tablet by mouth Daily., Disp: 90 tablet, Rfl: 1  •  traZODone (DESYREL) 50 MG tablet, Take 2 tablets by mouth every night at bedtime., Disp: 180 tablet, Rfl: 1  •  vitamin B-12 (CYANOCOBALAMIN) 1000 MCG tablet, Take 1 tablet by mouth Daily., Disp: 90 tablet, Rfl: 3    I have reviewed Past Medical History, Past Surgical History, Medication List, Social History and Family History as entered in Sleep Questionnaire and EPIC.    ESS  8   Vital Signs BP 98/76   Pulse 87   Ht 172.7 cm (68\")   Wt 74.4 kg (164 lb)   LMP  (LMP Unknown)   SpO2 98%   BMI 24.94 kg/m²  Body mass index is 24.94 kg/m².    General Alert and oriented. No acute distress noted   Pharynx/Throat Class IV Mallampati airway, large tongue, no evidence of redundant lateral pharyngeal tissue. No oral lesions. No thrush. Moist mucous membranes.   Head Normocephalic. " Symmetrical. Atraumatic.    Nose No septal deviation. No drainage   Chest Wall Normal shape. Symmetric expansion with respiration. No tenderness.   Neck Trachea midline, no thyromegaly or adenopathy    Lungs Clear to auscultation bilaterally. No wheezes. No rhonchi. No rales. Respirations regular, even and unlabored.   Heart Regular rhythm and normal rate. Normal S1 and S2. No murmur   Abdomen Soft, non-tender and non-distended. Normal bowel sounds. No masses.   Extremities Moves all extremities well. No edema   Psychiatric Normal mood and affect.        Impression:  1. Hypersomnia    2. Snoring    3. Witnessed episode of apnea          Plan:  I discussed the pathophysiology of obstructive sleep apnea with the patient.  We discussed the adverse outcomes associated with untreated sleep-disordered breathing.  We discussed treatment modalities of obstructive sleep apnea including CPAP device. Sleep study will be scheduled to establish a definitive diagnosis of sleep disorder breathing.  Weight loss will be strongly beneficial in order to reduce the severity of sleep-disordered breathing.  Patient has narrow oropharyngeal structure.  Caution during activities that require prolonged concentration is strongly advised.  After sleep study results are available, patient will be notified, and appointment will be scheduled to discuss sleep study results and treatment recommendations.    Patient was asked to bring all her night/makenzie meds here and take at lights out. I did not prescribe Ambien.    I appreciate the opportunity to participate in this patient's care.    Addendum  Insurance denied in lab PSG. Approved HST. Scheduled HST. Staff to inform pt.      RASHIDA Bahena  [facility]  Phone: [phone]      Part of this note may be an electronic transcription/translation of spoken language to printed text using the Dragon Dictation System. Some errors may exist even though the document was edited.

## 2022-10-31 ENCOUNTER — OFFICE VISIT (OUTPATIENT)
Dept: INTERNAL MEDICINE | Facility: CLINIC | Age: 57
End: 2022-10-31

## 2022-10-31 VITALS
BODY MASS INDEX: 25.34 KG/M2 | OXYGEN SATURATION: 97 % | DIASTOLIC BLOOD PRESSURE: 82 MMHG | HEIGHT: 68 IN | TEMPERATURE: 98.2 F | WEIGHT: 167.2 LBS | HEART RATE: 103 BPM | SYSTOLIC BLOOD PRESSURE: 124 MMHG

## 2022-10-31 DIAGNOSIS — F51.01 PRIMARY INSOMNIA: ICD-10-CM

## 2022-10-31 DIAGNOSIS — Z00.00 HEALTHCARE MAINTENANCE: ICD-10-CM

## 2022-10-31 DIAGNOSIS — F32.A DEPRESSION, UNSPECIFIED DEPRESSION TYPE: ICD-10-CM

## 2022-10-31 DIAGNOSIS — Z23 NEED FOR VACCINATION: ICD-10-CM

## 2022-10-31 DIAGNOSIS — F41.9 ANXIETY: Primary | ICD-10-CM

## 2022-10-31 PROCEDURE — G0008 ADMIN INFLUENZA VIRUS VAC: HCPCS | Performed by: INTERNAL MEDICINE

## 2022-10-31 PROCEDURE — 90686 IIV4 VACC NO PRSV 0.5 ML IM: CPT | Performed by: INTERNAL MEDICINE

## 2022-10-31 PROCEDURE — 99214 OFFICE O/P EST MOD 30 MIN: CPT | Performed by: INTERNAL MEDICINE

## 2022-10-31 RX ORDER — TRAZODONE HYDROCHLORIDE 50 MG/1
100 TABLET ORAL
Qty: 180 TABLET | Refills: 1 | Status: SHIPPED | OUTPATIENT
Start: 2022-10-31

## 2022-10-31 NOTE — PROGRESS NOTES
"Chief Complaint   Patient presents with   • Anxiety   • Depression   • Insomnia   • Hyperlipidemia       History of Present Illness   Meenakshi Mae is a 57 y.o. female presents for follow up evaluation. Patient struggles with anxiety and depression. She has reduced clonazepam and buproprion \"Im trying to wean off\". In general mood is good but she did feel poorly when she missed meds for several days. She notes that sleep continues to be disrupted at times even with trazodone. She may wake at 3 am and be unable to fall back to sleep. Lipids well regulated on crestor.           The following portions of the patient's history were reviewed and updated as appropriate: allergies, current medications, past family history, past medical history, past social history, past surgical history and problem list.  Current Outpatient Medications on File Prior to Visit   Medication Sig Dispense Refill   • albuterol sulfate  (90 Base) MCG/ACT inhaler INHALE 2 PUFFS BY MOUTH EVERY 4 HOURS AS NEEDED FOR WHEEZE 90 g 3   • amLODIPine (NORVASC) 5 MG tablet TAKE 1 TABLET BY MOUTH EVERY DAY 90 tablet 3   • buPROPion XL (WELLBUTRIN XL) 150 MG 24 hr tablet TAKE 1 TABLET BY MOUTH EVERY DAY 90 tablet 1   • Cholecalciferol (Vitamin D) 50 MCG (2000 UT) capsule Take 1 capsule by mouth Daily. 90 capsule 3   • clonazePAM (KlonoPIN) 0.5 MG tablet TAKE 1 TABLET BY MOUTH 2 TIMES A DAY AS NEEDED FOR ANXIETY. 60 tablet 1   • cyclobenzaprine (FLEXERIL) 10 MG tablet Take 1 tablet by mouth 2 (Two) Times a Day As Needed for Muscle Spasms. 30 tablet 4   • diclofenac (VOLTAREN) 1 % gel gel APPLY 4 GRAMS TO AFFECTED AREA(S) FOUR TIMES A DAY 1 tube 4   • eszopiclone (LUNESTA) 2 MG tablet TAKE 1 TABLET BY MOUTH EVERY NIGHT. TAKE IMMEDIATELY BEFORE BEDTIME 30 tablet 3   • famotidine (PEPCID) 40 MG tablet Take 1 tablet by mouth every night at bedtime. 90 tablet 3   • fluticasone (Flonase) 50 MCG/ACT nasal spray 2 sprays into the nostril(s) as directed by " provider Daily. 16 g 5   • fluticasone (Flovent Diskus) 50 MCG/BLIST diskus inhaler Inhale 1 puff 2 (Two) Times a Day. 1 each 12   • loratadine (Claritin) 10 MG tablet Take 1 tablet by mouth Daily. 90 tablet 3   • montelukast (Singulair) 10 MG tablet Take 1 tablet by mouth Every Night. 90 tablet 3   • PARoxetine (PAXIL) 40 MG tablet Take 1 tablet by mouth Every Morning. 90 tablet 3   • rosuvastatin (Crestor) 10 MG tablet Take 1 tablet by mouth Daily. 90 tablet 1   • vitamin B-12 (CYANOCOBALAMIN) 1000 MCG tablet Take 1 tablet by mouth Daily. 90 tablet 3   • [DISCONTINUED] traZODone (DESYREL) 50 MG tablet Take 2 tablets by mouth every night at bedtime. 180 tablet 1     No current facility-administered medications on file prior to visit.     Review of Systems   Constitutional: Negative.    HENT: Negative.    Eyes: Negative.    Respiratory: Negative.    Cardiovascular: Negative.    Gastrointestinal: Negative.    Endocrine: Negative.    Genitourinary: Negative.    Musculoskeletal: Negative.    Allergic/Immunologic: Negative.    Neurological: Negative.    Hematological: Negative.    Psychiatric/Behavioral: Negative.        Objective   Physical Exam  Vitals and nursing note reviewed.   Constitutional:       Appearance: Normal appearance. She is well-developed.   HENT:      Head: Normocephalic and atraumatic.      Right Ear: Tympanic membrane and external ear normal.      Left Ear: Tympanic membrane and external ear normal.      Nose: Nose normal.      Mouth/Throat:      Mouth: Mucous membranes are moist.   Eyes:      Extraocular Movements: Extraocular movements intact.      Pupils: Pupils are equal, round, and reactive to light.   Cardiovascular:      Rate and Rhythm: Normal rate and regular rhythm.      Pulses: Normal pulses.      Heart sounds: Normal heart sounds.   Pulmonary:      Effort: Pulmonary effort is normal. No respiratory distress.      Breath sounds: Normal breath sounds.   Abdominal:      General: Abdomen is  "flat.      Palpations: Abdomen is soft.   Musculoskeletal:         General: Normal range of motion.      Cervical back: Normal range of motion and neck supple.   Skin:     General: Skin is warm and dry.   Neurological:      General: No focal deficit present.      Mental Status: She is alert and oriented to person, place, and time.   Psychiatric:         Mood and Affect: Mood normal.         Behavior: Behavior normal.         Thought Content: Thought content normal.         Judgment: Judgment normal.          /82   Pulse 103   Temp 98.2 °F (36.8 °C)   Ht 172.7 cm (67.99\")   Wt 75.8 kg (167 lb 3.2 oz)   LMP  (LMP Unknown)   SpO2 97%   BMI 25.43 kg/m²     Assessment & Plan   Diagnoses and all orders for this visit:    Anxiety    Depression, unspecified depression type    Primary insomnia    Other orders  -     traZODone (DESYREL) 50 MG tablet; Take 2 tablets by mouth every night at bedtime.      Patient w/ anxiety and depression. She will remain on buproprion and paxil. Will take clonazepam in a prn fashion. Discussed good sleep hygeine at length. She will consume a healthy diet w/ routine movement. She will follow up in 6 months or prn. She reports being current w/ vaccinations. Will confirm w/ pharmacy. Flu vaccination given today.              "

## 2022-11-28 RX ORDER — FLUTICASONE PROPIONATE 50 MCG
SPRAY, SUSPENSION (ML) NASAL
Qty: 48 ML | Refills: 1 | Status: SHIPPED | OUTPATIENT
Start: 2022-11-28

## 2022-12-09 ENCOUNTER — APPOINTMENT (OUTPATIENT)
Dept: SLEEP MEDICINE | Facility: HOSPITAL | Age: 57
End: 2022-12-09

## 2022-12-19 ENCOUNTER — OFFICE VISIT (OUTPATIENT)
Dept: INTERNAL MEDICINE | Facility: CLINIC | Age: 57
End: 2022-12-19

## 2022-12-19 VITALS
HEIGHT: 68 IN | WEIGHT: 162 LBS | SYSTOLIC BLOOD PRESSURE: 146 MMHG | HEART RATE: 104 BPM | DIASTOLIC BLOOD PRESSURE: 86 MMHG | BODY MASS INDEX: 24.55 KG/M2

## 2022-12-19 DIAGNOSIS — R32 URINARY INCONTINENCE, UNSPECIFIED TYPE: ICD-10-CM

## 2022-12-19 DIAGNOSIS — N89.8 VAGINAL DISCHARGE: Primary | ICD-10-CM

## 2022-12-19 DIAGNOSIS — K64.9 HEMORRHOIDS, UNSPECIFIED HEMORRHOID TYPE: ICD-10-CM

## 2022-12-19 DIAGNOSIS — R10.32 LEFT LOWER QUADRANT PAIN: ICD-10-CM

## 2022-12-19 PROCEDURE — 99214 OFFICE O/P EST MOD 30 MIN: CPT | Performed by: INTERNAL MEDICINE

## 2022-12-19 NOTE — PROGRESS NOTES
"Chief Complaint   Patient presents with   • Abdominal Pain   • Urine Leakage       History of Present Illness   Meenakshi Mae is a 57 y.o. female presents for acute care. Patient has c/o urinary leakage. Occurs predominantly w/ cough, sneeze, or laughing. She wears a mini pad during the day but none at night. Occasional bladder urgency w/ leakage. Drinks 2 cups coffee daily.   Notes rectal pain about 3 weeks ago. She thinks it is hemorrhoidal in nature as she felt a reducible nodule. Has some constipation. She does hydrate with \"at least 38 ounces\" of water daily.   C/o abdominal pain left lower quadrant. Cramping in nature. \"off and on but lately has been catching more.\" no provoking. \"can happen at any time\". First noted about 2-3 months ago. She is s/p hysterectomy \"but I still have the ovaries\".           The following portions of the patient's history were reviewed and updated as appropriate: allergies, current medications, past family history, past medical history, past social history, past surgical history and problem list.  Current Outpatient Medications on File Prior to Visit   Medication Sig Dispense Refill   • albuterol sulfate  (90 Base) MCG/ACT inhaler INHALE 2 PUFFS BY MOUTH EVERY 4 HOURS AS NEEDED FOR WHEEZE 90 g 3   • amLODIPine (NORVASC) 5 MG tablet TAKE 1 TABLET BY MOUTH EVERY DAY 90 tablet 3   • buPROPion XL (WELLBUTRIN XL) 150 MG 24 hr tablet TAKE 1 TABLET BY MOUTH EVERY DAY 90 tablet 1   • Cholecalciferol (Vitamin D) 50 MCG (2000 UT) capsule Take 1 capsule by mouth Daily. 90 capsule 3   • clonazePAM (KlonoPIN) 0.5 MG tablet TAKE 1 TABLET BY MOUTH 2 TIMES A DAY AS NEEDED FOR ANXIETY. 60 tablet 1   • cyclobenzaprine (FLEXERIL) 10 MG tablet Take 1 tablet by mouth 2 (Two) Times a Day As Needed for Muscle Spasms. 30 tablet 4   • diclofenac (VOLTAREN) 1 % gel gel APPLY 4 GRAMS TO AFFECTED AREA(S) FOUR TIMES A DAY 1 tube 4   • famotidine (PEPCID) 40 MG tablet Take 1 tablet by mouth every " night at bedtime. 90 tablet 3   • fluticasone (FLONASE) 50 MCG/ACT nasal spray SPRAY 2 SPRAYS INTO THE NOSTRIL AS DIRECTED BY PROVIDER DAILY. 48 mL 1   • fluticasone (Flovent Diskus) 50 MCG/BLIST diskus inhaler Inhale 1 puff 2 (Two) Times a Day. 1 each 12   • PARoxetine (PAXIL) 40 MG tablet Take 1 tablet by mouth Every Morning. 90 tablet 3   • rosuvastatin (Crestor) 10 MG tablet Take 1 tablet by mouth Daily. 90 tablet 1   • traZODone (DESYREL) 50 MG tablet Take 2 tablets by mouth every night at bedtime. 180 tablet 1   • vitamin B-12 (CYANOCOBALAMIN) 1000 MCG tablet Take 1 tablet by mouth Daily. 90 tablet 3   • loratadine (Claritin) 10 MG tablet Take 1 tablet by mouth Daily. 90 tablet 3   • montelukast (Singulair) 10 MG tablet Take 1 tablet by mouth Every Night. 90 tablet 3     No current facility-administered medications on file prior to visit.     Review of Systems   Constitutional: Negative.    HENT: Negative.    Eyes: Negative.    Respiratory: Negative.    Cardiovascular: Negative.    Gastrointestinal: Positive for abdominal pain, constipation and nausea.   Endocrine: Negative.    Genitourinary: Positive for frequency.   Musculoskeletal: Negative.    Skin: Negative.    Allergic/Immunologic: Negative.    Neurological: Negative.    Hematological: Negative.    Psychiatric/Behavioral: Negative.        Objective   Physical Exam  Vitals and nursing note reviewed.   Constitutional:       Appearance: Normal appearance.   HENT:      Head: Normocephalic and atraumatic.      Right Ear: Tympanic membrane normal.      Left Ear: Tympanic membrane normal.      Nose: Nose normal.      Mouth/Throat:      Mouth: Mucous membranes are moist.   Eyes:      Extraocular Movements: Extraocular movements intact.      Pupils: Pupils are equal, round, and reactive to light.   Cardiovascular:      Rate and Rhythm: Normal rate and regular rhythm.      Pulses: Normal pulses.      Heart sounds: Normal heart sounds.   Pulmonary:      Effort:  "Pulmonary effort is normal.      Breath sounds: Normal breath sounds.   Abdominal:      General: Abdomen is flat.      Palpations: Abdomen is soft.      Comments: LLQ abdominal pain   Genitourinary:     Comments: Milky white discharge noted  Mild bladder prolapse noted  Rectum w/ right side hemorrhoid    Musculoskeletal:         General: Normal range of motion.      Cervical back: Normal range of motion and neck supple.   Skin:     General: Skin is warm and dry.   Neurological:      General: No focal deficit present.      Mental Status: She is alert and oriented to person, place, and time.   Psychiatric:         Mood and Affect: Mood normal.         Behavior: Behavior normal.         Thought Content: Thought content normal.         Judgment: Judgment normal.          /86   Pulse 104   Ht 172.7 cm (68\")   Wt 73.5 kg (162 lb)   LMP  (LMP Unknown)   BMI 24.63 kg/m²     Assessment & Plan   Diagnoses and all orders for this visit:    Vaginal discharge  -     NuSwab VG+ - Swab, Vagina    Urinary incontinence, unspecified type  -     Ambulatory Referral to Gynecologic Urology    Left lower quadrant pain  -     CT Abdomen Pelvis With & Without Contrast; Future    Hemorrhoids, unspecified hemorrhoid type        Patient w/ llq pain. Will get ct abd pelvis as patient still has ovaries. H/o breast ca. Possibly from constipation. She is to start fiber daily. Hydrate well/ increase water. May use miralax if needed. Will test vaginitis panel for discharge noted. She is to reduce or d/c coffee given urinary incontinenece. Discussed for stress incontinence there really is no medicaiton but keigel exercises can be beneficial. To urogyn for further eval. Possible pessary. F/u as scheduled.            Answers for HPI/ROS submitted by the patient on 12/13/2022  What is the primary reason for your visit?: Abdominal Pain      "

## 2022-12-21 LAB
A VAGINAE DNA VAG QL NAA+PROBE: NORMAL SCORE
BVAB2 DNA VAG QL NAA+PROBE: NORMAL SCORE
C ALBICANS DNA VAG QL NAA+PROBE: NEGATIVE
C GLABRATA DNA VAG QL NAA+PROBE: NEGATIVE
C TRACH DNA VAG QL NAA+PROBE: NEGATIVE
MEGA1 DNA VAG QL NAA+PROBE: NORMAL SCORE
N GONORRHOEA DNA VAG QL NAA+PROBE: NEGATIVE
T VAGINALIS DNA VAG QL NAA+PROBE: NEGATIVE

## 2022-12-23 ENCOUNTER — TELEPHONE (OUTPATIENT)
Dept: INTERNAL MEDICINE | Facility: CLINIC | Age: 57
End: 2022-12-23

## 2022-12-23 NOTE — TELEPHONE ENCOUNTER
Pt informed    ----- Message from Vandana Zavaleta MD sent at 12/23/2022 11:21 AM EST -----  Vaginitis panel is negative for yeast or bacterial overgrowth  jw

## 2023-01-05 DIAGNOSIS — E78.89 LIPIDS ABNORMAL: ICD-10-CM

## 2023-01-05 DIAGNOSIS — R79.89 ABNORMAL CBC: ICD-10-CM

## 2023-01-05 DIAGNOSIS — I10 ESSENTIAL HYPERTENSION: Primary | ICD-10-CM

## 2023-01-28 DIAGNOSIS — G47.00 INSOMNIA, UNSPECIFIED TYPE: ICD-10-CM

## 2023-01-30 RX ORDER — PAROXETINE HYDROCHLORIDE 40 MG/1
40 TABLET, FILM COATED ORAL EVERY MORNING
Qty: 90 TABLET | Refills: 3 | Status: SHIPPED | OUTPATIENT
Start: 2023-01-30

## 2023-01-30 RX ORDER — ESZOPICLONE 2 MG/1
2 TABLET, FILM COATED ORAL NIGHTLY
Qty: 30 TABLET | OUTPATIENT
Start: 2023-01-30

## 2023-02-01 ENCOUNTER — HOSPITAL ENCOUNTER (OUTPATIENT)
Dept: CT IMAGING | Facility: HOSPITAL | Age: 58
Discharge: HOME | End: 2023-02-01
Payer: MEDICARE

## 2023-02-01 ENCOUNTER — LAB (OUTPATIENT)
Dept: LAB | Facility: HOSPITAL | Age: 58
End: 2023-02-01
Payer: MEDICARE

## 2023-02-01 DIAGNOSIS — R10.32 LEFT LOWER QUADRANT PAIN: ICD-10-CM

## 2023-02-01 LAB
ALBUMIN SERPL-MCNC: 4.6 G/DL (ref 3.5–5.2)
ALBUMIN/GLOB SERPL: 2.4 G/DL
ALP SERPL-CCNC: 106 U/L (ref 39–117)
ALT SERPL W P-5'-P-CCNC: 51 U/L (ref 1–33)
ANION GAP SERPL CALCULATED.3IONS-SCNC: 7 MMOL/L (ref 5–15)
AST SERPL-CCNC: 35 U/L (ref 1–32)
BASOPHILS # BLD AUTO: 0.04 10*3/MM3 (ref 0–0.2)
BASOPHILS NFR BLD AUTO: 0.8 % (ref 0–1.5)
BILIRUB SERPL-MCNC: 0.4 MG/DL (ref 0–1.2)
BUN SERPL-MCNC: 6 MG/DL (ref 6–20)
BUN/CREAT SERPL: 7.5 (ref 7–25)
CALCIUM SPEC-SCNC: 9.9 MG/DL (ref 8.6–10.5)
CHLORIDE SERPL-SCNC: 104 MMOL/L (ref 98–107)
CHOLEST SERPL-MCNC: 111 MG/DL (ref 0–200)
CO2 SERPL-SCNC: 30 MMOL/L (ref 22–29)
CREAT BLDA-MCNC: 0.8 MG/DL (ref 0.6–1.3)
CREAT SERPL-MCNC: 0.8 MG/DL (ref 0.57–1)
DEPRECATED RDW RBC AUTO: 41.1 FL (ref 37–54)
EGFRCR SERPLBLD CKD-EPI 2021: 85 ML/MIN/1.73
EOSINOPHIL # BLD AUTO: 0.12 10*3/MM3 (ref 0–0.4)
EOSINOPHIL NFR BLD AUTO: 2.5 % (ref 0.3–6.2)
ERYTHROCYTE [DISTWIDTH] IN BLOOD BY AUTOMATED COUNT: 13.2 % (ref 12.3–15.4)
GLOBULIN UR ELPH-MCNC: 1.9 GM/DL
GLUCOSE SERPL-MCNC: 102 MG/DL (ref 65–99)
HCT VFR BLD AUTO: 36.8 % (ref 34–46.6)
HDLC SERPL-MCNC: 63 MG/DL (ref 40–60)
HGB BLD-MCNC: 11.6 G/DL (ref 12–15.9)
IMM GRANULOCYTES # BLD AUTO: 0.01 10*3/MM3 (ref 0–0.05)
IMM GRANULOCYTES NFR BLD AUTO: 0.2 % (ref 0–0.5)
LDLC SERPL CALC-MCNC: 31 MG/DL (ref 0–100)
LDLC/HDLC SERPL: 0.49 {RATIO}
LYMPHOCYTES # BLD AUTO: 2.01 10*3/MM3 (ref 0.7–3.1)
LYMPHOCYTES NFR BLD AUTO: 42 % (ref 19.6–45.3)
MCH RBC QN AUTO: 26.7 PG (ref 26.6–33)
MCHC RBC AUTO-ENTMCNC: 31.5 G/DL (ref 31.5–35.7)
MCV RBC AUTO: 84.8 FL (ref 79–97)
MONOCYTES # BLD AUTO: 0.44 10*3/MM3 (ref 0.1–0.9)
MONOCYTES NFR BLD AUTO: 9.2 % (ref 5–12)
NEUTROPHILS NFR BLD AUTO: 2.17 10*3/MM3 (ref 1.7–7)
NEUTROPHILS NFR BLD AUTO: 45.3 % (ref 42.7–76)
NRBC BLD AUTO-RTO: 0 /100 WBC (ref 0–0.2)
PLATELET # BLD AUTO: 289 10*3/MM3 (ref 140–450)
PMV BLD AUTO: 9.7 FL (ref 6–12)
POTASSIUM SERPL-SCNC: 4.5 MMOL/L (ref 3.5–5.2)
PROT SERPL-MCNC: 6.5 G/DL (ref 6–8.5)
RBC # BLD AUTO: 4.34 10*6/MM3 (ref 3.77–5.28)
SODIUM SERPL-SCNC: 141 MMOL/L (ref 136–145)
TRIGL SERPL-MCNC: 87 MG/DL (ref 0–150)
VLDLC SERPL-MCNC: 17 MG/DL (ref 5–40)
WBC NRBC COR # BLD: 4.79 10*3/MM3 (ref 3.4–10.8)

## 2023-02-01 PROCEDURE — 25010000002 IOPAMIDOL 61 % SOLUTION: Performed by: INTERNAL MEDICINE

## 2023-02-01 PROCEDURE — 85025 COMPLETE CBC W/AUTO DIFF WBC: CPT | Performed by: INTERNAL MEDICINE

## 2023-02-01 PROCEDURE — 36415 COLL VENOUS BLD VENIPUNCTURE: CPT

## 2023-02-01 PROCEDURE — 80053 COMPREHEN METABOLIC PANEL: CPT | Performed by: INTERNAL MEDICINE

## 2023-02-01 PROCEDURE — 74178 CT ABD&PLV WO CNTR FLWD CNTR: CPT

## 2023-02-01 PROCEDURE — 82565 ASSAY OF CREATININE: CPT

## 2023-02-01 PROCEDURE — 80061 LIPID PANEL: CPT | Performed by: INTERNAL MEDICINE

## 2023-02-01 RX ADMIN — IOPAMIDOL 85 ML: 612 INJECTION, SOLUTION INTRAVENOUS at 15:27

## 2023-02-03 ENCOUNTER — TELEPHONE (OUTPATIENT)
Dept: INTERNAL MEDICINE | Facility: CLINIC | Age: 58
End: 2023-02-03
Payer: MEDICARE

## 2023-02-03 DIAGNOSIS — M87.00 AVASCULAR NECROSIS OF BONE: Primary | ICD-10-CM

## 2023-02-03 DIAGNOSIS — R79.89 ABNORMAL LFTS: ICD-10-CM

## 2023-02-03 DIAGNOSIS — R94.5 ABNORMAL RESULTS OF LIVER FUNCTION STUDIES: ICD-10-CM

## 2023-02-03 DIAGNOSIS — G89.29 CHRONIC PAIN OF BOTH KNEES: ICD-10-CM

## 2023-02-03 DIAGNOSIS — M25.561 CHRONIC PAIN OF BOTH KNEES: ICD-10-CM

## 2023-02-03 DIAGNOSIS — D64.9 ANEMIA, UNSPECIFIED TYPE: Primary | ICD-10-CM

## 2023-02-03 DIAGNOSIS — M25.562 CHRONIC PAIN OF BOTH KNEES: ICD-10-CM

## 2023-02-03 NOTE — TELEPHONE ENCOUNTER
Pt informed    ----- Message from Vandana Zavaleta MD sent at 2/3/2023  3:03 PM EST -----  Liver enzymes are a little elevated and mild anemia is noted. I would like to test non fasting labs in 2 weeks.   toma

## 2023-02-09 ENCOUNTER — OFFICE VISIT (OUTPATIENT)
Dept: ORTHOPEDIC SURGERY | Facility: CLINIC | Age: 58
End: 2023-02-09
Payer: MEDICARE

## 2023-02-09 VITALS — TEMPERATURE: 97.4 F | WEIGHT: 157 LBS | BODY MASS INDEX: 23.79 KG/M2 | HEIGHT: 68 IN

## 2023-02-09 DIAGNOSIS — M87.051 AVASCULAR NECROSIS OF FEMORAL HEAD, RIGHT: ICD-10-CM

## 2023-02-09 DIAGNOSIS — M87.052 AVASCULAR NECROSIS OF FEMORAL HEAD, LEFT: ICD-10-CM

## 2023-02-09 DIAGNOSIS — R52 PAIN: Primary | ICD-10-CM

## 2023-02-09 PROCEDURE — 73522 X-RAY EXAM HIPS BI 3-4 VIEWS: CPT | Performed by: NURSE PRACTITIONER

## 2023-02-09 PROCEDURE — 99203 OFFICE O/P NEW LOW 30 MIN: CPT | Performed by: NURSE PRACTITIONER

## 2023-02-09 PROCEDURE — 73562 X-RAY EXAM OF KNEE 3: CPT | Performed by: NURSE PRACTITIONER

## 2023-02-09 NOTE — PROGRESS NOTES
Patient: Meenakshi Mae  YOB: 1965 57 y.o. female  Medical Record Number: [id number]    Chief Complaints:   Chief Complaint   Patient presents with   • Left Knee - Follow-up   • Right Knee - Follow-up     History of Present Illness:Meenakshi Mae is a 57 y.o. female who presents with complaints of having left hip and right flank pain that is chronic in nature.  Patient states that she started having worsening symptoms of left lower abdominal/hip pain.  Patient states she went and saw her primary care doctor and they ordered a CT of the abdomen and pelvis.  Patient does have a history of breast cancer which she is currently in remission.  Patient CT scan showed that she had avascular necrosis of the femoral heads, right was greater than the left.  Patient was referred to us by her PCP for further evaluation of the avascular necrosis.  Patient also states that she has been having some popping and grinding sensations of the knee and wanted that checked out as well.  She does report that she does not have any pain in the knee however.  Patient denies any signs or symptoms of infection, and she is without any other significant complaints today.    Allergies:   Allergies   Allergen Reactions   • Erythromycin Hives       Medications:   Current Outpatient Medications   Medication Sig Dispense Refill   • albuterol sulfate  (90 Base) MCG/ACT inhaler INHALE 2 PUFFS BY MOUTH EVERY 4 HOURS AS NEEDED FOR WHEEZE 90 g 3   • amLODIPine (NORVASC) 5 MG tablet TAKE 1 TABLET BY MOUTH EVERY DAY 90 tablet 3   • buPROPion XL (WELLBUTRIN XL) 150 MG 24 hr tablet TAKE 1 TABLET BY MOUTH EVERY DAY 90 tablet 1   • Cholecalciferol (Vitamin D) 50 MCG (2000 UT) capsule Take 1 capsule by mouth Daily. 90 capsule 3   • clonazePAM (KlonoPIN) 0.5 MG tablet TAKE 1 TABLET BY MOUTH 2 TIMES A DAY AS NEEDED FOR ANXIETY. 60 tablet 1   • cyclobenzaprine (FLEXERIL) 10 MG tablet Take 1 tablet by mouth 2 (Two) Times a Day As Needed for  "Muscle Spasms. 30 tablet 4   • diclofenac (VOLTAREN) 1 % gel gel APPLY 4 GRAMS TO AFFECTED AREA(S) FOUR TIMES A DAY 1 tube 4   • famotidine (PEPCID) 40 MG tablet Take 1 tablet by mouth every night at bedtime. 90 tablet 3   • fluticasone (FLONASE) 50 MCG/ACT nasal spray SPRAY 2 SPRAYS INTO THE NOSTRIL AS DIRECTED BY PROVIDER DAILY. 48 mL 1   • fluticasone (Flovent Diskus) 50 MCG/BLIST diskus inhaler Inhale 1 puff 2 (Two) Times a Day. 1 each 12   • PARoxetine (PAXIL) 40 MG tablet Take 1 tablet by mouth Every Morning. 90 tablet 3   • rosuvastatin (Crestor) 10 MG tablet Take 1 tablet by mouth Daily. 90 tablet 1   • traZODone (DESYREL) 50 MG tablet Take 2 tablets by mouth every night at bedtime. 180 tablet 1   • vitamin B-12 (CYANOCOBALAMIN) 1000 MCG tablet Take 1 tablet by mouth Daily. 90 tablet 3   • loratadine (Claritin) 10 MG tablet Take 1 tablet by mouth Daily. 90 tablet 3   • montelukast (Singulair) 10 MG tablet Take 1 tablet by mouth Every Night. 90 tablet 3     No current facility-administered medications for this visit.         The following portions of the patient's history were reviewed and updated as appropriate: allergies, current medications, past family history, past medical history, past social history, past surgical history and problem list.    Review of Systems:   A 14 point review of systems was performed. All systems negative except pertinent positives/negative listed in HPI above    Physical Exam:   Vitals:    02/09/23 1130   Temp: 97.4 °F (36.3 °C)   TempSrc: Temporal   Weight: 71.2 kg (157 lb)   Height: 172.7 cm (68\")   PainSc:   2   PainLoc: Knee       General: A and O x 3, ASA, NAD    SCLERA:    Normal    DENTITION:   Normal     Hip:  bilateral    LEG ALIGNMENT:     Neutral   ,    equal leg lengths    GAIT:     Nonantalgic    SKIN:     No abnormality    RANGE OF MOTION:      Full without joint irritability    STRENGTH:     5 / 5    hip flexion and abduction    DISTAL PULSES:    Paplable    DISTAL " SENSATION :   Intact    LYMPHATICS:     No   lymphadenopathy    OTHER:          - Negative Stinchfeld test      - Negative log roll      - No Tenderness to palpation trochanteric bursa      - Neg FADIR      - Neg JOSE JUAN      - No SI tenderness     Knee:  bilateral    ALIGNMENT:     Neutral  ,   Patella tracks   midline    GAIT:     Nonantalgic    SKIN:    No abnormality    RANGE OF MOTION:   0  -  135   DEG    STRENGTH:   5 / 5    LIGAMENTS:    No varus / valgus instability.   Negative  Lachman.    MENISCUS:     Negative   Josefa       DISTAL PULSES:    Paplable    DISTAL SENSATION :   Intact    LYMPHATICS:     No   lymphadenopathy    OTHER:          - No  effusion      - No crepitance with ROM      - No Swelling /  tenderness to palpation pes anserine bursa       Radiology:  Xrays 2views (AP bilateral hips, and lateral of the hip) ordered and reviewed for evaluation of hip pain  demonstrating  evidence of AVN without subchondral femoral head collapse noted.  No other x-rays available for comparison purposes.    X-rays 3 views bilateral knees (AP, lateral, sunrise views) ordered and reviewed for evaluation demonstrate no obvious abnormalities noted.  No other x-rays available for comparison purposes.    Assessment/Plan: Avascular necrosis bilateral hips    Treatment options as well as imaging results were discussed in detail with the patient.  I did also consult with Dr. Mackey with this patient.  Based on her symptoms as well as a history of breast cancer we feel it is best to do an MRI of bilateral hips for further evaluation of the avascular necrosis determine if there is femoral head collapse.  Also we would like to evaluate to make sure there is no bone metastasis that could be causing her symptoms.  Once we get the MRI results we will have the patient come back in for a follow-up for further treatment options      Camilo Malloy, APRN  2/9/2023

## 2023-02-10 ENCOUNTER — PATIENT ROUNDING (BHMG ONLY) (OUTPATIENT)
Dept: ORTHOPEDIC SURGERY | Facility: CLINIC | Age: 58
End: 2023-02-10
Payer: MEDICARE

## 2023-02-10 NOTE — PROGRESS NOTES
A [employer]beat Message has been sent to the patient for PATIENT ROUNDING with [facility]

## 2023-02-13 ENCOUNTER — TELEPHONE (OUTPATIENT)
Dept: INTERNAL MEDICINE | Facility: CLINIC | Age: 58
End: 2023-02-13

## 2023-02-13 RX ORDER — ESZOPICLONE 2 MG/1
2 TABLET, FILM COATED ORAL NIGHTLY
Qty: 30 TABLET | Refills: 2 | Status: SHIPPED | OUTPATIENT
Start: 2023-02-13
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
## 2023-02-25 LAB
ALBUMIN SERPL-MCNC: 4.9 G/DL (ref 3.8–4.9)
ALBUMIN/GLOB SERPL: 2 {RATIO} (ref 1.2–2.2)
ALP SERPL-CCNC: 112 IU/L (ref 44–121)
ALT SERPL-CCNC: 30 IU/L (ref 0–32)
AST SERPL-CCNC: 25 IU/L (ref 0–40)
BASOPHILS # BLD AUTO: 0.1 X10E3/UL (ref 0–0.2)
BASOPHILS NFR BLD AUTO: 1 %
BILIRUB SERPL-MCNC: 0.4 MG/DL (ref 0–1.2)
BUN SERPL-MCNC: 10 MG/DL (ref 6–24)
BUN/CREAT SERPL: 14 (ref 9–23)
CALCIUM SERPL-MCNC: 10.1 MG/DL (ref 8.7–10.2)
CHLORIDE SERPL-SCNC: 101 MMOL/L (ref 96–106)
CO2 SERPL-SCNC: 23 MMOL/L (ref 20–29)
CREAT SERPL-MCNC: 0.72 MG/DL (ref 0.57–1)
EGFRCR SERPLBLD CKD-EPI 2021: 96 ML/MIN/1.73
EOSINOPHIL # BLD AUTO: 0.1 X10E3/UL (ref 0–0.4)
EOSINOPHIL NFR BLD AUTO: 1 %
ERYTHROCYTE [DISTWIDTH] IN BLOOD BY AUTOMATED COUNT: 13.7 % (ref 11.7–15.4)
FOLATE SERPL-MCNC: 8.5 NG/ML
GLOBULIN SER CALC-MCNC: 2.5 G/DL (ref 1.5–4.5)
GLUCOSE SERPL-MCNC: 90 MG/DL (ref 70–99)
HAV IGM SERPL QL IA: NEGATIVE
HBV CORE IGM SERPL QL IA: NEGATIVE
HBV SURFACE AG SERPL QL IA: NEGATIVE
HCT VFR BLD AUTO: 38.7 % (ref 34–46.6)
HCV AB SERPL QL IA: NORMAL
HCV IGG SERPL QL IA: NON REACTIVE
HGB BLD-MCNC: 12.6 G/DL (ref 11.1–15.9)
IMM GRANULOCYTES # BLD AUTO: 0 X10E3/UL (ref 0–0.1)
IMM GRANULOCYTES NFR BLD AUTO: 0 %
IRON SATN MFR SERPL: 25 % (ref 15–55)
IRON SERPL-MCNC: 86 UG/DL (ref 27–159)
LYMPHOCYTES # BLD AUTO: 2.7 X10E3/UL (ref 0.7–3.1)
LYMPHOCYTES NFR BLD AUTO: 41 %
MCH RBC QN AUTO: 27.6 PG (ref 26.6–33)
MCHC RBC AUTO-ENTMCNC: 32.6 G/DL (ref 31.5–35.7)
MCV RBC AUTO: 85 FL (ref 79–97)
MONOCYTES # BLD AUTO: 0.6 X10E3/UL (ref 0.1–0.9)
MONOCYTES NFR BLD AUTO: 9 %
NEUTROPHILS # BLD AUTO: 3.1 X10E3/UL (ref 1.4–7)
NEUTROPHILS NFR BLD AUTO: 48 %
PLATELET # BLD AUTO: 276 X10E3/UL (ref 150–450)
POTASSIUM SERPL-SCNC: 4.9 MMOL/L (ref 3.5–5.2)
PROT SERPL-MCNC: 7.4 G/DL (ref 6–8.5)
RBC # BLD AUTO: 4.56 X10E6/UL (ref 3.77–5.28)
SODIUM SERPL-SCNC: 143 MMOL/L (ref 134–144)
TIBC SERPL-MCNC: 340 UG/DL (ref 250–450)
UIBC SERPL-MCNC: 254 UG/DL (ref 131–425)
VIT B12 SERPL-MCNC: 1459 PG/ML (ref 232–1245)
WBC # BLD AUTO: 6.6 X10E3/UL (ref 3.4–10.8)

## 2023-03-01 ENCOUNTER — HOSPITAL ENCOUNTER (OUTPATIENT)
Dept: MRI IMAGING | Facility: HOSPITAL | Age: 58
Discharge: HOME | End: 2023-03-01
Payer: MEDICARE

## 2023-03-01 DIAGNOSIS — M87.052 AVASCULAR NECROSIS OF FEMORAL HEAD, LEFT: ICD-10-CM

## 2023-03-01 DIAGNOSIS — M87.051 AVASCULAR NECROSIS OF FEMORAL HEAD, RIGHT: ICD-10-CM

## 2023-03-01 PROCEDURE — 73721 MRI JNT OF LWR EXTRE W/O DYE: CPT

## 2023-03-07 ENCOUNTER — TELEPHONE (OUTPATIENT)
Dept: ORTHOPEDIC SURGERY | Facility: CLINIC | Age: 58
End: 2023-03-07
Payer: MEDICARE

## 2023-03-07 NOTE — TELEPHONE ENCOUNTER
I attempted to contact this patient to go over the MRI results.  Patient not answer therefore I left her a voicemail instructing her that Dr. Mackey would like to see her back in the clinic for further treatment options.  Therefore we will have one of our medical assistants arrange a follow-up appointment with Dr. Mackey so they can discuss these results as well as further treatment options in the clinic.

## 2023-03-07 NOTE — PROGRESS NOTES
Dr. Mackey would like to see this patient back in the clinic in the next 2 to 3 weeks for reevaluation as well as potential treatment options.  Please schedule this patient a follow-up appointment with Dr. Mackey during this time window.

## 2023-03-08 ENCOUNTER — TELEPHONE (OUTPATIENT)
Dept: ORTHOPEDIC SURGERY | Facility: CLINIC | Age: 58
End: 2023-03-08
Payer: MEDICARE

## 2023-03-08 NOTE — TELEPHONE ENCOUNTER
----- Message from Ca Miller sent at 3/8/2023  9:00 AM EST -----  PLEASE CALL TO SCHEDULE      ----- Message -----  From: Camilo Malloy APRN  Sent: 3/7/2023   6:09 PM EST  To: k [facility] Clinical Pool    Dr. Mackey would like to see this patient back in the clinic in the next 2 to 3 weeks for reevaluation as well as potential treatment options.  Please schedule this patient a follow-up appointment with Dr. Mackey during this time window.

## 2023-03-11 DIAGNOSIS — F41.9 ANXIETY: ICD-10-CM

## 2023-03-13 RX ORDER — CLONAZEPAM 0.5 MG/1
TABLET ORAL
Qty: 60 TABLET | Refills: 1 | Status: SHIPPED | OUTPATIENT
Start: 2023-03-13

## 2023-03-14 ENCOUNTER — TELEPHONE (OUTPATIENT)
Dept: ONCOLOGY | Facility: CLINIC | Age: 58
End: 2023-03-14
Payer: MEDICARE

## 2023-03-14 NOTE — TELEPHONE ENCOUNTER
Caller: Meenakshi Mae    Relationship to patient: Self    Best call back number: [phone] CALL ANYTIME    Chief complaint: PT APPT FOR 03/27/23 WAS CANCELED AND RESCHEDULE TO 04/19/23. PT WOULD LIKE AN APPT SOONER IF AVAILABLE. OKAY WITH CURRENT DATE IF IT CAN NOT BE CHANGED.    Type of visit: LAB AND FOLLOWUP    If rescheduling, when is the original appointment: 04/19/23

## 2023-04-19 ENCOUNTER — OFFICE VISIT (OUTPATIENT)
Dept: ONCOLOGY | Facility: CLINIC | Age: 58
End: 2023-04-19
Payer: MEDICARE

## 2023-04-19 ENCOUNTER — LAB (OUTPATIENT)
Dept: LAB | Facility: HOSPITAL | Age: 58
End: 2023-04-19
Payer: MEDICARE

## 2023-04-19 VITALS
BODY MASS INDEX: 23.79 KG/M2 | WEIGHT: 157 LBS | HEIGHT: 68 IN | RESPIRATION RATE: 16 BRPM | SYSTOLIC BLOOD PRESSURE: 133 MMHG | DIASTOLIC BLOOD PRESSURE: 83 MMHG | TEMPERATURE: 97.4 F | OXYGEN SATURATION: 98 % | HEART RATE: 81 BPM

## 2023-04-19 DIAGNOSIS — Z17.1 MALIGNANT NEOPLASM OF UPPER-OUTER QUADRANT OF RIGHT BREAST IN FEMALE, ESTROGEN RECEPTOR NEGATIVE: Primary | ICD-10-CM

## 2023-04-19 DIAGNOSIS — C50.411 MALIGNANT NEOPLASM OF UPPER-OUTER QUADRANT OF RIGHT BREAST IN FEMALE, ESTROGEN RECEPTOR NEGATIVE: Primary | ICD-10-CM

## 2023-04-19 LAB
BASOPHILS # BLD AUTO: 0.05 10*3/MM3 (ref 0–0.2)
BASOPHILS NFR BLD AUTO: 0.8 % (ref 0–1.5)
DEPRECATED RDW RBC AUTO: 44.5 FL (ref 37–54)
EOSINOPHIL # BLD AUTO: 0.09 10*3/MM3 (ref 0–0.4)
EOSINOPHIL NFR BLD AUTO: 1.4 % (ref 0.3–6.2)
ERYTHROCYTE [DISTWIDTH] IN BLOOD BY AUTOMATED COUNT: 14.5 % (ref 12.3–15.4)
HCT VFR BLD AUTO: 38.1 % (ref 34–46.6)
HGB BLD-MCNC: 12.4 G/DL (ref 12–15.9)
IMM GRANULOCYTES # BLD AUTO: 0.02 10*3/MM3 (ref 0–0.05)
IMM GRANULOCYTES NFR BLD AUTO: 0.3 % (ref 0–0.5)
LYMPHOCYTES # BLD AUTO: 2.31 10*3/MM3 (ref 0.7–3.1)
LYMPHOCYTES NFR BLD AUTO: 35.4 % (ref 19.6–45.3)
MCH RBC QN AUTO: 27.7 PG (ref 26.6–33)
MCHC RBC AUTO-ENTMCNC: 32.5 G/DL (ref 31.5–35.7)
MCV RBC AUTO: 85.2 FL (ref 79–97)
MONOCYTES # BLD AUTO: 0.71 10*3/MM3 (ref 0.1–0.9)
MONOCYTES NFR BLD AUTO: 10.9 % (ref 5–12)
NEUTROPHILS NFR BLD AUTO: 3.35 10*3/MM3 (ref 1.7–7)
NEUTROPHILS NFR BLD AUTO: 51.2 % (ref 42.7–76)
NRBC BLD AUTO-RTO: 0 /100 WBC (ref 0–0.2)
PLATELET # BLD AUTO: 253 10*3/MM3 (ref 140–450)
PMV BLD AUTO: 9.7 FL (ref 6–12)
RBC # BLD AUTO: 4.47 10*6/MM3 (ref 3.77–5.28)
WBC NRBC COR # BLD: 6.53 10*3/MM3 (ref 3.4–10.8)

## 2023-04-19 PROCEDURE — 1125F AMNT PAIN NOTED PAIN PRSNT: CPT | Performed by: INTERNAL MEDICINE

## 2023-04-19 PROCEDURE — 36415 COLL VENOUS BLD VENIPUNCTURE: CPT

## 2023-04-19 PROCEDURE — 3075F SYST BP GE 130 - 139MM HG: CPT | Performed by: INTERNAL MEDICINE

## 2023-04-19 PROCEDURE — 85025 COMPLETE CBC W/AUTO DIFF WBC: CPT

## 2023-04-19 PROCEDURE — 99214 OFFICE O/P EST MOD 30 MIN: CPT | Performed by: INTERNAL MEDICINE

## 2023-04-19 PROCEDURE — 3079F DIAST BP 80-89 MM HG: CPT | Performed by: INTERNAL MEDICINE

## 2023-04-19 NOTE — PROGRESS NOTES
Subjective .     REASONS FOR FOLLOWUP:  Breast cancer    HISTORY OF PRESENT ILLNESS:  The patient is a 57 y.o. year old female  who is here for follow-up with the above-mentioned history.    She states she is doing better emotionally since more time away from her mother's passing.  No new symptoms to suggest recurrence.  No change of chronic nausea or chronic back pain or joint pain.    Past Medical History:   Diagnosis Date   • Anemia    • Arthritis of back 2015   • CTS (carpal tunnel syndrome) 2009   • Depression    • H/O transfusion of packed red blood cells    • Headache    • History of low back pain    • Malignant neoplasm of breast 2013    Right, T2N1M0, Stage IIB   • Thoracic disc disorder 2015       HEMATOLOGIC/ONCOLOGIC HISTORY:  (History from previous dates can be found in the separate document.)    MEDICATIONS    Current Outpatient Medications:   •  albuterol sulfate  (90 Base) MCG/ACT inhaler, INHALE 2 PUFFS BY MOUTH EVERY 4 HOURS AS NEEDED FOR WHEEZE, Disp: 90 g, Rfl: 3  •  amLODIPine (NORVASC) 5 MG tablet, TAKE 1 TABLET BY MOUTH EVERY DAY, Disp: 90 tablet, Rfl: 3  •  buPROPion XL (WELLBUTRIN XL) 150 MG 24 hr tablet, TAKE 1 TABLET BY MOUTH EVERY DAY, Disp: 90 tablet, Rfl: 1  •  Cholecalciferol (Vitamin D) 50 MCG (2000 UT) capsule, Take 1 capsule by mouth Daily., Disp: 90 capsule, Rfl: 3  •  clonazePAM (KlonoPIN) 0.5 MG tablet, TAKE 1 TABLET BY MOUTH TWICE A DAY AS NEEDED FOR ANXIETY, Disp: 60 tablet, Rfl: 1  •  cyclobenzaprine (FLEXERIL) 10 MG tablet, Take 1 tablet by mouth 2 (Two) Times a Day As Needed for Muscle Spasms., Disp: 30 tablet, Rfl: 4  •  diclofenac (VOLTAREN) 1 % gel gel, APPLY 4 GRAMS TO AFFECTED AREA(S) FOUR TIMES A DAY, Disp: 1 tube, Rfl: 4  •  eszopiclone (Lunesta) 2 MG tablet, Take 1 tablet by mouth Every Night. Take immediately before bedtime, Disp: 30 tablet, Rfl: 2  •  famotidine (PEPCID) 40 MG tablet, Take 1 tablet by mouth every night at bedtime., Disp: 90 tablet, Rfl:  3  •  fluticasone (FLONASE) 50 MCG/ACT nasal spray, SPRAY 2 SPRAYS INTO THE NOSTRIL AS DIRECTED BY PROVIDER DAILY., Disp: 48 mL, Rfl: 1  •  fluticasone (Flovent Diskus) 50 MCG/BLIST diskus inhaler, Inhale 1 puff 2 (Two) Times a Day., Disp: 1 each, Rfl: 12  •  PARoxetine (PAXIL) 40 MG tablet, Take 1 tablet by mouth Every Morning., Disp: 90 tablet, Rfl: 3  •  rosuvastatin (Crestor) 10 MG tablet, Take 1 tablet by mouth Daily., Disp: 90 tablet, Rfl: 1  •  traZODone (DESYREL) 50 MG tablet, Take 2 tablets by mouth every night at bedtime., Disp: 180 tablet, Rfl: 1  •  vitamin B-12 (CYANOCOBALAMIN) 1000 MCG tablet, Take 1 tablet by mouth Daily., Disp: 90 tablet, Rfl: 3  •  loratadine (Claritin) 10 MG tablet, Take 1 tablet by mouth Daily., Disp: 90 tablet, Rfl: 3  •  montelukast (Singulair) 10 MG tablet, Take 1 tablet by mouth Every Night., Disp: 90 tablet, Rfl: 3    ALLERGIES:     Allergies   Allergen Reactions   • Erythromycin Hives       SOCIAL HISTORY:       Social History     Socioeconomic History   • Marital status:    • Years of education: College   Tobacco Use   • Smoking status: Former     Packs/day: 0.00     Years: 1.00     Pack years: 0.00     Types: Cigarettes     Start date: 1979     Quit date: 1980     Years since quittin.7   • Smokeless tobacco: Former   • Tobacco comments:     Smoked from age [age].   Substance and Sexual Activity   • Alcohol use: Yes     Alcohol/week: 1.0 standard drink     Types: 1 Glasses of wine per week     Comment: Occasional   • Drug use: No   • Sexual activity: Yes     Partners: Male     Birth control/protection: Condom         FAMILY HISTORY:  Family History   Problem Relation Age of Onset   • Breast cancer Mother    • Heart disease Mother    • Hypertension Mother    • Cancer Mother         Breast cancer   • Stomach cancer Father    • Cancer Father         Systematic   • Ovarian cancer Maternal Aunt    • Stomach cancer Maternal Aunt    • Cancer Brother          "Lung and brain cancer   • Cancer Brother         Prostate cancer       REVIEW OF SYSTEMS:  Review of Systems   Constitutional: Negative for activity change.   HENT: Negative for nosebleeds and trouble swallowing.    Respiratory: Negative for shortness of breath and wheezing.    Cardiovascular: Negative for chest pain and palpitations.   Gastrointestinal: Positive for nausea. Negative for constipation and diarrhea.   Genitourinary: Negative for dysuria and hematuria.   Musculoskeletal: Negative for arthralgias and myalgias.   Skin: Negative for rash and wound.   Neurological: Negative for seizures and syncope.   Hematological: Does not bruise/bleed easily.   Psychiatric/Behavioral: Negative for confusion.          Objective    Vitals:    04/19/23 1035   BP: 133/83   Pulse: 81   Resp: 16   Temp: 97.4 °F (36.3 °C)   TempSrc: Temporal   SpO2: 98%   Weight: 71.2 kg (157 lb)   Height: 172 cm (67.72\")   PainSc:   5   PainLoc: Back         4/19/2023    10:37 AM   Current Status   ECOG score 0      PHYSICAL EXAM:        CONSTITUTIONAL:  Vital signs reviewed.  No distress, looks comfortable.  EYES:  Conjunctiva and lids unremarkable.  PERRLA  EARS,NOSE,MOUTH,THROAT:  Ears and nose appear unremarkable.  Lips, teeth, gums appear unremarkable.  RESPIRATORY:  Normal respiratory effort.  Lungs clear to auscultation bilaterally.  CARDIOVASCULAR:  Normal S1, S2.  No murmurs rubs or gallops.  No significant lower extremity edema.  GASTROINTESTINAL: Abdomen appears unremarkable.  Nontender.  No hepatomegaly.  No splenomegaly.  LYMPHATIC:  No cervical, supraclavicular, axillary lymphadenopathy.  SKIN:  Warm.  No rashes.  PSYCHIATRIC:  Normal judgment and insight.  Normal mood and affect.          RECENT LABS:        WBC   Date/Time Value Ref Range Status   04/19/2023 10:18 AM 6.53 3.40 - 10.80 10*3/mm3 Final   02/24/2023 11:30 AM 6.6 3.4 - 10.8 x10E3/uL Final     Hemoglobin   Date/Time Value Ref Range Status   04/19/2023 10:18 AM 12.4 " 12.0 - 15.9 g/dL Final     Platelets   Date/Time Value Ref Range Status   04/19/2023 10:18  140 - 450 10*3/mm3 Final       Assessment/Plan     ASSESSMENT:  *T2N1M0 (stage IIB) invasive ductal carcinoma of the right breast. Esther score 9 of 9 (high grade), triple-negative, 3.3 cm tumor. Right mastectomy and TRAM flap reconstruction. Completed dose-dense Adriamycin/Cytoxan with dose-dense Taxol 07/11/2013. Completed radiation October 2013.   · I reminded again today we would be quite unusual for her cancer to recur this late.  There is a good probability she is cured of this cancer.    No symptoms or exam findings to suggest recurrence.    *Previously complained of chest discomfort upon bending over since around December 2018 or so.  I suspect this is related to her weight gain.  No signs of malignancy as the reason for the discomfort.  · CT chest 5/14/2019 (done due to chest discomfort with bending over): No evidence of malignancy.    *Nausea.  IBS.  Managed by both Dr. Gallegos and Dr. Vandana Zavaleta.  No changes in chronic nausea    *. Intermittent blurry vision, left eye more so than right eye.  She has seen her ophthalmologist for this.  She states nothing concerning was found.  She follows with Dr. Kiran Cabrera of neurology for this.  A prior MRI read as possible optic neuritis.  She states the follow-up MRI was fine.  She did not complain of this today.  No complaints of this today    *Overweight  Being overweight is a risk factor for breast cancer.  Ideally, she should lose weight.  Body mass index is 24.07 kg/m².  BMI 25 to <30 is overweight  BMI 30 to <35 is class 1 obesity  BMI 35 to <40 is class 2 obesity  BMI 40 or higher is class 3 obesity   Remains overweight.  Ideally, lose weight.    * 2 liver lesions on 1/31/13 staging CAT scan.  Liver protocol CT subsequently read as benign liver lesions.  Liver lesions unchanged on CT 5/4/16.    * She has an annual  for breast cancer.      * Chronic  sciatica and cervical pain.  Treatment through her PCP, Dr. Vandana Zavaleta.  As had epidurals.    *Previously complained of left preauricular minimally enlarged node.  Was following with Dr. Tin Ordoñez of ENT.  The node did not feel malignant my exam.  Dr. Ordoñez, last record I have for review is 4/6/18.    *Her mother passed away September 2021.    She is dealing with this loss.    Plan  · MD CBC 1 year (because she is over 5 years out from triple negative breast cancer, this is likely cured)  · Her port has been removed.   · Last mammogram, 7/5/2022: BI-RADS 2    For this visit I also reviewed CMP, iron panel, serum folate, B12, CBC from 2/24/2023 which were all not ordered by me.

## 2023-04-24 RX ORDER — TRAZODONE HYDROCHLORIDE 50 MG/1
100 TABLET ORAL
Qty: 180 TABLET | Refills: 1 | Status: SHIPPED | OUTPATIENT
Start: 2023-04-24

## 2023-04-24 RX ORDER — ALBUTEROL SULFATE 90 UG/1
AEROSOL, METERED RESPIRATORY (INHALATION)
Qty: 18 G | Refills: 3 | Status: SHIPPED | OUTPATIENT
Start: 2023-04-24

## 2023-04-24 RX ORDER — FLUTICASONE PROPIONATE 50 MCG
SPRAY, SUSPENSION (ML) NASAL
Qty: 48 ML | Refills: 1 | Status: SHIPPED | OUTPATIENT
Start: 2023-04-24

## 2023-05-05 RX ORDER — AMLODIPINE BESYLATE 5 MG/1
5 TABLET ORAL DAILY
Qty: 90 TABLET | Refills: 3 | Status: SHIPPED | OUTPATIENT
Start: 2023-05-05

## 2023-05-05 NOTE — TELEPHONE ENCOUNTER
Caller: Meenakshi Mae    Relationship: Self    Best call back number: [phone]    Requested Prescriptions:   Requested Prescriptions     Pending Prescriptions Disp Refills   • amLODIPine (NORVASC) 5 MG tablet 90 tablet 3     Sig: Take 1 tablet by mouth Daily.        Pharmacy where request should be sent: [facility]/PHARMACY #[id number] [place] [id number] [facility] AT [address] [phone] [facility] [phone]      Last office visit with prescribing clinician: 12/19/2022   Last telemedicine visit with prescribing clinician: Visit date not found   Next office visit with prescribing clinician: Visit date not found     Additional details provided by patient: PATIENT STATES SHE IS COMPLETELY OUT OF THIS PRESCRIPTION.    Does the patient have less than a 3 day supply:  [x] Yes  [] No    Would you like a call back once the refill request has been completed: [] Yes [] No    If the office needs to give you a call back, can they leave a voicemail: [] Yes [] No    Epifanio Clements Rep   05/05/23 10:11 EDT

## 2023-07-23 NOTE — TELEPHONE ENCOUNTER
Eye drops rx to her pharmacy. One drop 4 times daily. If not improving she should have this looked at by an eye specailist  
Pt called to say that she had a stye on her R lower lid over a week ago and now she has one on her L upper lid. She has tried OTC Stye cream but doesn't think its working. I told her to use warm compressions as often as she can but is asking is there something else she can do  
Pt informed   
Our Emergency Department Referral Coordinators will be reaching out to you in the next 24-48 hours from 9:00am to 5:00pm with a follow up appointment. Please expect a phone call from the hospital in that time frame. If you do not receive a call or if you have any questions or concerns, you can reach them at   [phone].    Fracture    A fracture is a break in one of your bones. This can occur from a variety of injuries, especially traumatic ones. Symptoms include pain, bruising, or swelling. Do not use the injured limb. If a fracture is in one of the bones below your waist, do not put weight on that limb unless instructed to do so by your healthcare provider. Crutches or a cane may have been provided. A splint or cast may have been applied by your health care provider. Make sure to keep it dry and follow up with an orthopedist as instructed.    SEEK IMMEDIATE MEDICAL CARE IF YOU HAVE ANY OF THE FOLLOWING SYMPTOMS: numbness, tingling, increasing pain, or weakness in any part of the injured limb.

## 2023-07-24 NOTE — TELEPHONE ENCOUNTER
----- Message from Esther Nascimento RN sent at 4/20/2018  8:29 AM EDT -----  Order has been placed, thanks    ----- Message -----  From: Liya Means RN  Sent: 4/20/2018   7:54 AM  To: Meenakshi Terry, BARD, #    Please put in order and call patient.  ----- Message -----  From: Osmin Edmonds II, MD  Sent: 4/19/2018   4:39 PM  [place]: [facility]     Please call her and tell her the last mammogram we have on file is 2/1/17.  See if she has had another mammogram.  If not, she needs 1.  Thank you       What Type Of Note Output Would You Prefer (Optional)?: Standard Output Hpi Title: Evaluation of Skin Lesions How Severe Are Your Spot(S)?: mild Have Your Spot(S) Been Treated In The Past?: has not been treated

## 2023-07-31 ENCOUNTER — OFFICE VISIT (OUTPATIENT)
Dept: INTERNAL MEDICINE | Facility: CLINIC | Age: 58
End: 2023-07-31
Payer: MEDICARE

## 2023-07-31 VITALS
BODY MASS INDEX: 22.91 KG/M2 | DIASTOLIC BLOOD PRESSURE: 76 MMHG | HEIGHT: 67 IN | WEIGHT: 146 LBS | OXYGEN SATURATION: 99 % | SYSTOLIC BLOOD PRESSURE: 118 MMHG | HEART RATE: 87 BPM

## 2023-07-31 DIAGNOSIS — G47.00 INSOMNIA, UNSPECIFIED TYPE: ICD-10-CM

## 2023-07-31 DIAGNOSIS — K21.01 GASTROESOPHAGEAL REFLUX DISEASE WITH ESOPHAGITIS AND HEMORRHAGE: ICD-10-CM

## 2023-07-31 DIAGNOSIS — R63.4 WEIGHT LOSS: ICD-10-CM

## 2023-07-31 DIAGNOSIS — R13.10 DYSPHAGIA, UNSPECIFIED TYPE: Primary | ICD-10-CM

## 2023-07-31 DIAGNOSIS — I10 HYPERTENSION, UNSPECIFIED TYPE: ICD-10-CM

## 2023-07-31 DIAGNOSIS — R53.83 OTHER FATIGUE: ICD-10-CM

## 2023-07-31 PROCEDURE — 1160F RVW MEDS BY RX/DR IN RCRD: CPT | Performed by: INTERNAL MEDICINE

## 2023-07-31 PROCEDURE — 99214 OFFICE O/P EST MOD 30 MIN: CPT | Performed by: INTERNAL MEDICINE

## 2023-07-31 PROCEDURE — 3074F SYST BP LT 130 MM HG: CPT | Performed by: INTERNAL MEDICINE

## 2023-07-31 PROCEDURE — 3078F DIAST BP <80 MM HG: CPT | Performed by: INTERNAL MEDICINE

## 2023-07-31 PROCEDURE — 1159F MED LIST DOCD IN RCRD: CPT | Performed by: INTERNAL MEDICINE

## 2023-07-31 RX ORDER — AMLODIPINE BESYLATE 2.5 MG/1
2.5 TABLET ORAL DAILY
Qty: 30 TABLET | Refills: 5 | Status: SHIPPED | OUTPATIENT
Start: 2023-07-31

## 2023-08-01 LAB
ALBUMIN SERPL-MCNC: 5.1 G/DL (ref 3.5–5.2)
ALBUMIN/GLOB SERPL: 2.4 G/DL
ALP SERPL-CCNC: 109 U/L (ref 39–117)
ALT SERPL-CCNC: 26 U/L (ref 1–33)
AST SERPL-CCNC: 21 U/L (ref 1–32)
BASOPHILS # BLD AUTO: 0.05 10*3/MM3 (ref 0–0.2)
BASOPHILS NFR BLD AUTO: 1 % (ref 0–1.5)
BILIRUB SERPL-MCNC: 0.5 MG/DL (ref 0–1.2)
BUN SERPL-MCNC: 8 MG/DL (ref 8–23)
BUN/CREAT SERPL: 11.1 (ref 7–25)
CALCIUM SERPL-MCNC: 10.1 MG/DL (ref 8.6–10.5)
CHLORIDE SERPL-SCNC: 100 MMOL/L (ref 98–107)
CO2 SERPL-SCNC: 27.6 MMOL/L (ref 22–29)
CREAT SERPL-MCNC: 0.72 MG/DL (ref 0.57–1)
EGFRCR SERPLBLD CKD-EPI 2021: 95.9 ML/MIN/1.73
EOSINOPHIL # BLD AUTO: 0.05 10*3/MM3 (ref 0–0.4)
EOSINOPHIL NFR BLD AUTO: 1 % (ref 0.3–6.2)
ERYTHROCYTE [DISTWIDTH] IN BLOOD BY AUTOMATED COUNT: 12.9 % (ref 12.3–15.4)
GLOBULIN SER CALC-MCNC: 2.1 GM/DL
GLUCOSE SERPL-MCNC: 94 MG/DL (ref 65–99)
HCT VFR BLD AUTO: 40.8 % (ref 34–46.6)
HGB BLD-MCNC: 13.2 G/DL (ref 12–15.9)
IMM GRANULOCYTES # BLD AUTO: 0.01 10*3/MM3 (ref 0–0.05)
IMM GRANULOCYTES NFR BLD AUTO: 0.2 % (ref 0–0.5)
LYMPHOCYTES # BLD AUTO: 2.13 10*3/MM3 (ref 0.7–3.1)
LYMPHOCYTES NFR BLD AUTO: 41.7 % (ref 19.6–45.3)
MCH RBC QN AUTO: 27.6 PG (ref 26.6–33)
MCHC RBC AUTO-ENTMCNC: 32.4 G/DL (ref 31.5–35.7)
MCV RBC AUTO: 85.2 FL (ref 79–97)
MONOCYTES # BLD AUTO: 0.55 10*3/MM3 (ref 0.1–0.9)
MONOCYTES NFR BLD AUTO: 10.8 % (ref 5–12)
NEUTROPHILS # BLD AUTO: 2.32 10*3/MM3 (ref 1.7–7)
NEUTROPHILS NFR BLD AUTO: 45.3 % (ref 42.7–76)
NRBC BLD AUTO-RTO: 0 /100 WBC (ref 0–0.2)
PLATELET # BLD AUTO: 289 10*3/MM3 (ref 140–450)
POTASSIUM SERPL-SCNC: 4.8 MMOL/L (ref 3.5–5.2)
PROT SERPL-MCNC: 7.2 G/DL (ref 6–8.5)
RBC # BLD AUTO: 4.79 10*6/MM3 (ref 3.77–5.28)
SODIUM SERPL-SCNC: 141 MMOL/L (ref 136–145)
TSH SERPL DL<=0.005 MIU/L-ACNC: 1.55 UIU/ML (ref 0.27–4.2)
WBC # BLD AUTO: 5.11 10*3/MM3 (ref 3.4–10.8)

## 2023-08-03 ENCOUNTER — HOSPITAL ENCOUNTER (OUTPATIENT)
Dept: MAMMOGRAPHY | Facility: HOSPITAL | Age: 58
Discharge: HOME | End: 2023-08-03
Admitting: INTERNAL MEDICINE
Payer: MEDICARE

## 2023-08-03 DIAGNOSIS — R92.8 ABNORMAL MAMMOGRAM OF LEFT BREAST: ICD-10-CM

## 2023-08-03 PROCEDURE — 77065 DX MAMMO INCL CAD UNI: CPT

## 2023-08-04 DIAGNOSIS — R92.8 ABNORMAL MAMMOGRAM OF LEFT BREAST: Primary | ICD-10-CM

## 2023-08-30 ENCOUNTER — HOSPITAL ENCOUNTER (OUTPATIENT)
Dept: MAMMOGRAPHY | Facility: HOSPITAL | Age: 58
Discharge: HOME | End: 2023-08-30
Payer: MEDICARE

## 2023-08-30 VITALS
DIASTOLIC BLOOD PRESSURE: 82 MMHG | RESPIRATION RATE: 16 BRPM | TEMPERATURE: 97.8 F | HEART RATE: 71 BPM | WEIGHT: 146 LBS | BODY MASS INDEX: 22.13 KG/M2 | HEIGHT: 68 IN | SYSTOLIC BLOOD PRESSURE: 128 MMHG | OXYGEN SATURATION: 100 %

## 2023-08-30 DIAGNOSIS — R92.8 ABNORMAL MAMMOGRAM OF LEFT BREAST: ICD-10-CM

## 2023-08-30 PROCEDURE — 88305 TISSUE EXAM BY PATHOLOGIST: CPT | Performed by: INTERNAL MEDICINE

## 2023-08-30 PROCEDURE — 0 LIDOCAINE 1 % SOLUTION: Performed by: INTERNAL MEDICINE

## 2023-08-30 RX ORDER — LIDOCAINE HYDROCHLORIDE 10 MG/ML
10 INJECTION, SOLUTION INFILTRATION; PERINEURAL ONCE
Status: COMPLETED | OUTPATIENT
Start: 2023-08-30 | End: 2023-08-30

## 2023-08-30 RX ADMIN — LIDOCAINE HYDROCHLORIDE 8 ML: 10; .005 INJECTION, SOLUTION EPIDURAL; INFILTRATION; INTRACAUDAL; PERINEURAL at 12:26

## 2023-08-30 RX ADMIN — LIDOCAINE HYDROCHLORIDE 1 ML: 10 INJECTION, SOLUTION INFILTRATION; PERINEURAL at 12:25

## 2023-08-30 NOTE — NURSING NOTE
Biopsy site to left outer lower breast clear with Dermabond dry and intact. No firmness or swelling noted at or around biopsy site. Denies pain. Ice pack with protective covering applied to biopsy site. Discharge instructions discussed with understanding voiced by patient. Copies provided to patient. No distress noted. To home via private vehicle.

## 2023-08-30 NOTE — H&P
Name: Meenakshi Mae ADMIT: 2023   : 1965  PCP: Vandana Zavaleta MD    MRN: [id number] LOS: 0 days   AGE/SEX: 58 y.o. female  ROOM: Room/bed info not found       Chief complaint L breast calcs    Present Illness or Internal History:  Patient is a 58 y.o. female presents with L breast calcs for stereo bx.     Past Surgical History:  Past Surgical History:   Procedure Laterality Date    BREAST BIOPSY Right     HAND SURGERY Left 2015    HYSTERECTOMY  2008    MASTECTOMY MODIFIED RADICAL Right 2013    TRIGGER POINT INJECTION         Past Medical History:  Past Medical History:   Diagnosis Date    Anemia     Arthritis of back     CTS (carpal tunnel syndrome)     Depression     H/O transfusion of packed red blood cells     Headache     History of low back pain     Malignant neoplasm of breast 2013    Right, T2N1M0, Stage IIB    Thoracic disc disorder        Home Medications:  (Not in a hospital admission)      Allergies:  Erythromycin    Family History:  Family History   Problem Relation Age of Onset    Breast cancer Mother     Heart disease Mother     Hypertension Mother     Cancer Mother         Breast cancer    Stomach cancer Father     Cancer Father         Systematic    Cancer Brother         Lung and brain cancer    Cancer Brother         Prostate cancer    Ovarian cancer Maternal Aunt     Stomach cancer Maternal Aunt        Social History:  Social History     Tobacco Use    Smoking status: Former     Packs/day: 0.00     Years: 1.00     Pack years: 0.00     Types: Cigarettes     Start date: 1979     Quit date: 1980     Years since quittin.1    Smokeless tobacco: Former    Tobacco comments:     Smoked from age [age].   Substance Use Topics    Alcohol use: Yes     Alcohol/week: 1.0 standard drink     Types: 1 Glasses of wine per week     Comment: Occasional    Drug use: No        Objective     Physical Exam:    No exam performed today,    Vital Signs  Temp:   [97.8 øF (36.6 øC)] 97.8 øF (36.6 øC)  Heart Rate:  [78] 78  Resp:  [16] 16  BP: (113)/(75) 113/75    Anticipated Surgical Procedure:  Left breast stereotactic core needle biopsy    The risks, benefits and alternatives of this procedure have been discussed with the patient or responsible party: Yes        Susie Mcdermott MD  08/30/23  12:02 EDT

## 2023-08-31 ENCOUNTER — TELEPHONE (OUTPATIENT)
Dept: INTERNAL MEDICINE | Facility: CLINIC | Age: 58
End: 2023-08-31
Payer: MEDICARE

## 2023-08-31 LAB
LAB AP CASE REPORT: NORMAL
LAB AP DIAGNOSIS COMMENT: NORMAL
PATH REPORT.FINAL DX SPEC: NORMAL
PATH REPORT.GROSS SPEC: NORMAL

## 2023-08-31 NOTE — TELEPHONE ENCOUNTER
----- Message from Vandana Zavaleta MD sent at 8/31/2023  4:01 PM EDT -----  Normal tissue pathology with no sign of cancer.   toma

## 2023-09-07 ENCOUNTER — HOSPITAL ENCOUNTER (OUTPATIENT)
Dept: GENERAL RADIOLOGY | Facility: HOSPITAL | Age: 58
Discharge: HOME | End: 2023-09-07
Admitting: INTERNAL MEDICINE
Payer: MEDICARE

## 2023-09-07 DIAGNOSIS — R63.4 WEIGHT LOSS: ICD-10-CM

## 2023-09-07 DIAGNOSIS — R13.10 DYSPHAGIA, UNSPECIFIED TYPE: ICD-10-CM

## 2023-09-07 PROCEDURE — 63710000001 SOD BICARB-CITRIC ACID-SIMETHICONE 2.21-1.53-0.04 G PACK: Performed by: RADIOLOGY

## 2023-09-07 PROCEDURE — 63710000001 BARIUM SULFATE 700 MG TABLET: Performed by: RADIOLOGY

## 2023-09-07 PROCEDURE — A9270 NON-COVERED ITEM OR SERVICE: HCPCS | Performed by: RADIOLOGY

## 2023-09-07 PROCEDURE — 63710000001 BARIUM SULFATE 96 % RECONSTITUTED SUSPENSION: Performed by: RADIOLOGY

## 2023-09-07 PROCEDURE — 63710000001 BARIUM SULFATE 98 % RECONSTITUTED SUSPENSION: Performed by: RADIOLOGY

## 2023-09-07 PROCEDURE — 74221 X-RAY XM ESOPHAGUS 2CNTRST: CPT

## 2023-09-07 RX ORDER — CYCLOBENZAPRINE HCL 10 MG
TABLET ORAL
Qty: 30 TABLET | Refills: 4 | Status: SHIPPED | OUTPATIENT
Start: 2023-09-07

## 2023-09-07 RX ORDER — ROSUVASTATIN CALCIUM 20 MG/1
TABLET, COATED ORAL
Qty: 90 TABLET | Refills: 1 | Status: SHIPPED | OUTPATIENT
Start: 2023-09-07 | End: 2023-09-11

## 2023-09-07 RX ADMIN — BARIUM SULFATE 700 MG: 700 TABLET ORAL at 09:10

## 2023-09-07 RX ADMIN — ANTACID/ANTIFLATULENT 0.5 PACKET: 380; 550; 10; 10 GRANULE, EFFERVESCENT ORAL at 09:10

## 2023-09-07 RX ADMIN — BARIUM SULFATE 183 ML: 960 POWDER, FOR SUSPENSION ORAL at 09:10

## 2023-09-07 RX ADMIN — BARIUM SULFATE 135 ML: 980 POWDER, FOR SUSPENSION ORAL at 09:10

## 2023-09-11 RX ORDER — ROSUVASTATIN CALCIUM 20 MG/1
TABLET, COATED ORAL
Qty: 90 TABLET | Refills: 1 | Status: SHIPPED | OUTPATIENT
Start: 2023-09-11 | End: 2023-09-12

## 2023-09-12 DIAGNOSIS — F41.9 ANXIETY: ICD-10-CM

## 2023-09-12 RX ORDER — BUPROPION HYDROCHLORIDE 150 MG/1
TABLET ORAL
Qty: 90 TABLET | Refills: 1 | Status: SHIPPED | OUTPATIENT
Start: 2023-09-12

## 2023-09-12 RX ORDER — CLONAZEPAM 0.5 MG/1
TABLET ORAL
Qty: 60 TABLET | Refills: 1 | Status: SHIPPED | OUTPATIENT
Start: 2023-09-12

## 2023-09-12 RX ORDER — ROSUVASTATIN CALCIUM 20 MG/1
TABLET, COATED ORAL
Qty: 90 TABLET | Refills: 1 | Status: SHIPPED | OUTPATIENT
Start: 2023-09-12

## 2023-10-03 ENCOUNTER — OFFICE VISIT (OUTPATIENT)
Dept: INTERNAL MEDICINE | Facility: CLINIC | Age: 58
End: 2023-10-03
Payer: MEDICARE

## 2023-10-03 VITALS
BODY MASS INDEX: 22.6 KG/M2 | WEIGHT: 144 LBS | HEART RATE: 95 BPM | HEIGHT: 67 IN | SYSTOLIC BLOOD PRESSURE: 116 MMHG | DIASTOLIC BLOOD PRESSURE: 74 MMHG | OXYGEN SATURATION: 98 %

## 2023-10-03 DIAGNOSIS — E78.5 HYPERLIPIDEMIA, UNSPECIFIED HYPERLIPIDEMIA TYPE: ICD-10-CM

## 2023-10-03 DIAGNOSIS — I10 ESSENTIAL HYPERTENSION: ICD-10-CM

## 2023-10-03 DIAGNOSIS — F41.9 ANXIETY: ICD-10-CM

## 2023-10-03 DIAGNOSIS — G47.09 OTHER INSOMNIA: Primary | ICD-10-CM

## 2023-10-03 DIAGNOSIS — Z23 NEED FOR VACCINATION: ICD-10-CM

## 2023-10-03 RX ORDER — DOXEPIN HYDROCHLORIDE 10 MG/1
10 CAPSULE ORAL NIGHTLY
Qty: 90 CAPSULE | Refills: 1 | Status: SHIPPED | OUTPATIENT
Start: 2023-10-03

## 2023-10-03 NOTE — PROGRESS NOTES
Chief Complaint   Patient presents with    Hypertension    Insomnia       Hypertension     Meenakshi Mae is a 58 y.o. female presents for follow up evaluation. In general patient is doing well. She does struggle w/ insomnia. She falls asleep easily but then wakes throughout the night. She is taking paxil and wellbutrin for mood. She notes her mood is GREAT. Better family relations. Daughter engaged.     The following portions of the patient's history were reviewed and updated as appropriate: allergies, current medications, past family history, past medical history, past social history, past surgical history and problem list.  Current Outpatient Medications on File Prior to Visit   Medication Sig Dispense Refill    albuterol sulfate  (90 Base) MCG/ACT inhaler INHALE 2 PUFFS BY MOUTH EVERY 4 HOURS AS NEEDED FOR WHEEZING 18 g 3    amLODIPine (NORVASC) 2.5 MG tablet Take 1 tablet by mouth Daily. 30 tablet 5    buPROPion XL (WELLBUTRIN XL) 150 MG 24 hr tablet TAKE 1 TABLET BY MOUTH EVERY DAY 90 tablet 1    Cholecalciferol (Vitamin D) 50 MCG (2000 UT) capsule Take 1 capsule by mouth Daily. 90 capsule 3    clonazePAM (KlonoPIN) 0.5 MG tablet TAKE 1 TABLET BY MOUTH TWICE A DAY AS NEEDED FOR ANXIETY 60 tablet 1    cyclobenzaprine (FLEXERIL) 10 MG tablet TAKE 1 TABLET BY MOUTH 2 TIMES A DAY AS NEEDED FOR MUSCLE SPASMS. 30 tablet 4    diclofenac (VOLTAREN) 1 % gel gel APPLY 4 GRAMS TO AFFECTED AREA(S) FOUR TIMES A DAY 1 tube 4    famotidine (PEPCID) 40 MG tablet Take 1 tablet by mouth every night at bedtime. 90 tablet 3    fluticasone (FLONASE) 50 MCG/ACT nasal spray SPRAY 2 SPRAYS INTO THE NOSTRIL DAILY AS DIRECTED BY PROVIDER 48 mL 1    PARoxetine (PAXIL) 40 MG tablet Take 1 tablet by mouth Every Morning. 90 tablet 3    rosuvastatin (CRESTOR) 20 MG tablet TAKE 1 TABLET BY MOUTH EVERY DAY 90 tablet 1    vitamin B-12 (CYANOCOBALAMIN) 1000 MCG tablet Take 1 tablet by mouth Daily. 90 tablet 3    [DISCONTINUED]  "traZODone (DESYREL) 50 MG tablet TAKE 2 TABLETS BY MOUTH EVERY NIGHT AT BEDTIME 180 tablet 1     No current facility-administered medications on file prior to visit.     Review of Systems   Constitutional: Negative.    HENT: Negative.     Eyes: Negative.    Respiratory: Negative.     Cardiovascular: Negative.    Gastrointestinal: Negative.    Endocrine: Negative.    Genitourinary: Negative.    Musculoskeletal: Negative.    Skin: Negative.    Allergic/Immunologic: Negative.    Neurological: Negative.    Hematological: Negative.    Psychiatric/Behavioral: Negative.       Objective   Physical Exam  Vitals and nursing note reviewed.   Constitutional:       Appearance: Normal appearance. She is well-developed.   HENT:      Head: Normocephalic and atraumatic.      Right Ear: Tympanic membrane and external ear normal.      Left Ear: Tympanic membrane and external ear normal.      Nose: Nose normal.      Mouth/Throat:      Mouth: Mucous membranes are moist.   Eyes:      Extraocular Movements: Extraocular movements intact.      Pupils: Pupils are equal, round, and reactive to light.   Cardiovascular:      Rate and Rhythm: Normal rate and regular rhythm.      Pulses: Normal pulses.      Heart sounds: Normal heart sounds.   Pulmonary:      Effort: Pulmonary effort is normal. No respiratory distress.      Breath sounds: Normal breath sounds.   Abdominal:      General: Abdomen is flat.      Palpations: Abdomen is soft.   Musculoskeletal:         General: Normal range of motion.      Cervical back: Normal range of motion and neck supple.   Skin:     General: Skin is warm and dry.   Neurological:      General: No focal deficit present.      Mental Status: She is alert and oriented to person, place, and time.   Psychiatric:         Mood and Affect: Mood normal.         Behavior: Behavior normal.         Thought Content: Thought content normal.         Judgment: Judgment normal.        /74   Pulse 95   Ht 170.2 cm (67\")   " Wt 65.3 kg (144 lb)   LMP  (LMP Unknown)   SpO2 98%   BMI 22.55 kg/m²     Assessment & Plan   Diagnoses and all orders for this visit:    Other insomnia    Essential hypertension    Hyperlipidemia, unspecified hyperlipidemia type    Anxiety    Other orders  -     Fluzone >6 Months (4112-7307)  -     doxepin (SINEquan) 10 MG capsule; Take 1 capsule by mouth Every Night.      Patient w/ insomnia, htn, hyperlip, anxiety. She will try switching from trazodone to doxepin. Work on sleep hygiene. She will continue to monitor bp. Lipids at goal in feb. She will continue current meds for mood.reviewed labs from July.

## 2023-10-20 RX ORDER — ALBUTEROL SULFATE 90 UG/1
2 AEROSOL, METERED RESPIRATORY (INHALATION) EVERY 4 HOURS PRN
Qty: 18 G | Refills: 3 | Status: SHIPPED | OUTPATIENT
Start: 2023-10-20

## 2023-10-20 NOTE — TELEPHONE ENCOUNTER
Caller: Meenakshi Mae    Relationship: Self    Best call back number: [phone]     Requested Prescriptions:   Requested Prescriptions     Pending Prescriptions Disp Refills    albuterol sulfate  (90 Base) MCG/ACT inhaler 18 g 3     Si puffs Every 4 (Four) Hours As Needed for Wheezing.        Pharmacy where request should be sent: [facility]/PHARMACY #[id number] [place] [id number] [facility] AT [address] [phone] [facility] [phone]      Last office visit with prescribing clinician: 10/3/2023   Last telemedicine visit with prescribing clinician: Visit date not found   Next office visit with prescribing clinician: 2024     Additional details provided by patient: PATIENT STATES SHE USES THE INHALER MULTIPLE TIMES DAILY. PATIENT IS REQUESTING THE MEDICATION BE CHANGED TO DAILY USE, NOT JUST AS NEEDED. PATIENT STATES SHE RUNS OUT OF MEDICATION & IT TAKES 3-4 DAYS FOR THE PHARMACY TO REFILL, WHICH LEAVES THE PATIENT WITHOUT MEDICATION THAT IS NEEDED.     Does the patient have less than a 3 day supply:  [x] Yes  [] No    Would you like a call back once the refill request has been completed: [] Yes [x] No    Epifanio Ortega Rep   10/20/23 13:39 EDT

## 2023-12-05 ENCOUNTER — TELEPHONE (OUTPATIENT)
Dept: INTERNAL MEDICINE | Facility: CLINIC | Age: 58
End: 2023-12-05
Payer: MEDICARE

## 2023-12-05 NOTE — TELEPHONE ENCOUNTER
Caller: Meenakshi Mae    Relationship: Self    Best call back number: [phone]     What medication are you requesting: PAIN MEDICATION     What are your current symptoms: PAIN ALL OVER ESPECIALLY IN BACK AREA     If a prescription is needed, what is your preferred pharmacy and phone number: [id number] - [place], [place] - [address] [facility] AT [address] [phone] [facility] [phone] FX

## 2023-12-08 ENCOUNTER — TELEPHONE (OUTPATIENT)
Dept: ONCOLOGY | Facility: CLINIC | Age: 58
End: 2023-12-08
Payer: MEDICARE

## 2023-12-08 ENCOUNTER — OFFICE VISIT (OUTPATIENT)
Dept: INTERNAL MEDICINE | Facility: CLINIC | Age: 58
End: 2023-12-08
Payer: MEDICARE

## 2023-12-08 ENCOUNTER — DOCUMENTATION (OUTPATIENT)
Dept: ONCOLOGY | Facility: CLINIC | Age: 58
End: 2023-12-08
Payer: MEDICARE

## 2023-12-08 VITALS
WEIGHT: 163 LBS | HEART RATE: 88 BPM | OXYGEN SATURATION: 97 % | HEIGHT: 67 IN | SYSTOLIC BLOOD PRESSURE: 116 MMHG | DIASTOLIC BLOOD PRESSURE: 80 MMHG | BODY MASS INDEX: 25.58 KG/M2

## 2023-12-08 DIAGNOSIS — E55.9 VITAMIN D DEFICIENCY: ICD-10-CM

## 2023-12-08 DIAGNOSIS — M54.42 CHRONIC BILATERAL LOW BACK PAIN WITH BILATERAL SCIATICA: ICD-10-CM

## 2023-12-08 DIAGNOSIS — T45.1X5A PERIPHERAL NEUROPATHY DUE TO CHEMOTHERAPY: ICD-10-CM

## 2023-12-08 DIAGNOSIS — M54.41 CHRONIC BILATERAL LOW BACK PAIN WITH BILATERAL SCIATICA: ICD-10-CM

## 2023-12-08 DIAGNOSIS — M25.511 CHRONIC PAIN OF BOTH SHOULDERS: ICD-10-CM

## 2023-12-08 DIAGNOSIS — R53.83 FATIGUE, UNSPECIFIED TYPE: Primary | ICD-10-CM

## 2023-12-08 DIAGNOSIS — G62.0 PERIPHERAL NEUROPATHY DUE TO CHEMOTHERAPY: ICD-10-CM

## 2023-12-08 DIAGNOSIS — M25.512 CHRONIC PAIN OF BOTH SHOULDERS: ICD-10-CM

## 2023-12-08 DIAGNOSIS — G89.29 CHRONIC PAIN OF BOTH SHOULDERS: ICD-10-CM

## 2023-12-08 DIAGNOSIS — G89.29 CHRONIC BILATERAL LOW BACK PAIN WITH BILATERAL SCIATICA: ICD-10-CM

## 2023-12-08 DIAGNOSIS — M25.50 POLYARTHRALGIA: ICD-10-CM

## 2023-12-08 DIAGNOSIS — G47.00 INSOMNIA, UNSPECIFIED TYPE: ICD-10-CM

## 2023-12-08 RX ORDER — METHYLPREDNISOLONE 4 MG/1
TABLET ORAL
Qty: 21 TABLET | Refills: 0 | Status: SHIPPED | OUTPATIENT
Start: 2023-12-08

## 2023-12-08 RX ORDER — DOXEPIN HYDROCHLORIDE 25 MG/1
25 CAPSULE ORAL NIGHTLY
Qty: 90 CAPSULE | Refills: 2 | Status: SHIPPED | OUTPATIENT
Start: 2023-12-08

## 2023-12-08 NOTE — PROGRESS NOTES
Please call her and tell her if she would like we could try medication for neuropathy.  The best proven medication for neuropathy is Cymbalta.  However, considering her other medications, if she would like to try Cymbalta I think she should see [facility] oncology so Nalini could manage those medicines.    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 Zavaleta advised her to call our office to see if she could be referred anywhere for help with this. I asked her what type of referral and she thought massage therapy.   ----- Message -----  From: Thais Tirado RN  Sent: 12/8/2023   2:35 PM EST  To: Leanne Grant RN      ----- Message -----  From: Meaghan Thompson  Sent: 12/8/2023   2:13 PM EST  To: [facility]

## 2023-12-08 NOTE — TELEPHONE ENCOUNTER
Provider: Code  Caller: patient  Relationship to Patient: self  Call Back Phone Number: [phone]  Reason for Call: patient is calling about neuropathy

## 2023-12-08 NOTE — TELEPHONE ENCOUNTER
Returned call to Meenakshi she is not interested with medication at this time, she asked if we would set up massage or acupuncture and I informed her she would not need a referral from office and could visit any person she would like. She v/u.

## 2023-12-08 NOTE — PROGRESS NOTES
"Chief Complaint   Patient presents with    Pain     Fall over body       Pain       Meenakshi Mae is a 58 y.o. female presents for acute care. Patient notes pain \"all over\". Specifically noting pain that radiates from back down to feet. This is bilateral but more right than left. No swolloen or inflamed joints but pain shoulders B, hips, knees, \"everything\". Patient has taken tramadol, gabapentin, flexeril w no real benefit.   Patient is taking doxepin 20 mg for insomnia. She notes that she sleeps 6-8 hours with this. Recently started a new job and loves this.         The following portions of the patient's history were reviewed and updated as appropriate: allergies, current medications, past family history, past medical history, past social history, past surgical history and problem list.  Current Outpatient Medications on File Prior to Visit   Medication Sig Dispense Refill    albuterol sulfate  (90 Base) MCG/ACT inhaler Inhale 2 puffs Every 4 (Four) Hours As Needed for Wheezing. 18 g 3    amLODIPine (NORVASC) 2.5 MG tablet Take 1 tablet by mouth Daily. 30 tablet 5    buPROPion XL (WELLBUTRIN XL) 150 MG 24 hr tablet TAKE 1 TABLET BY MOUTH EVERY DAY 90 tablet 1    clonazePAM (KlonoPIN) 0.5 MG tablet TAKE 1 TABLET BY MOUTH TWICE A DAY AS NEEDED FOR ANXIETY 60 tablet 1    cyclobenzaprine (FLEXERIL) 10 MG tablet TAKE 1 TABLET BY MOUTH 2 TIMES A DAY AS NEEDED FOR MUSCLE SPASMS. 30 tablet 4    diclofenac (VOLTAREN) 1 % gel gel APPLY 4 GRAMS TO AFFECTED AREA(S) FOUR TIMES A DAY 1 tube 4    famotidine (PEPCID) 40 MG tablet Take 1 tablet by mouth every night at bedtime. 90 tablet 3    fluticasone (FLONASE) 50 MCG/ACT nasal spray SPRAY 2 SPRAYS INTO THE NOSTRIL DAILY AS DIRECTED BY PROVIDER 48 mL 1    PARoxetine (PAXIL) 40 MG tablet Take 1 tablet by mouth Every Morning. 90 tablet 3    rosuvastatin (CRESTOR) 20 MG tablet TAKE 1 TABLET BY MOUTH EVERY DAY 90 tablet 1    vitamin B-12 (CYANOCOBALAMIN) 1000 MCG tablet " "Take 1 tablet by mouth Daily. 90 tablet 3    [DISCONTINUED] doxepin (SINEquan) 10 MG capsule Take 1 capsule by mouth Every Night. 90 capsule 1    Cholecalciferol (Vitamin D) 50 MCG (2000 UT) capsule Take 1 capsule by mouth Daily. (Patient not taking: Reported on 12/8/2023) 90 capsule 3     No current facility-administered medications on file prior to visit.     Review of Systems    Objective   Physical Exam  Vitals and nursing note reviewed.   Constitutional:       Appearance: Normal appearance.   HENT:      Head: Normocephalic and atraumatic.      Right Ear: Tympanic membrane normal.      Left Ear: Tympanic membrane normal.      Nose: Nose normal.      Mouth/Throat:      Mouth: Mucous membranes are moist.   Eyes:      Extraocular Movements: Extraocular movements intact.      Pupils: Pupils are equal, round, and reactive to light.   Cardiovascular:      Rate and Rhythm: Normal rate and regular rhythm.      Pulses: Normal pulses.      Heart sounds: Normal heart sounds.   Pulmonary:      Effort: Pulmonary effort is normal.      Breath sounds: Normal breath sounds.   Abdominal:      General: Abdomen is flat.   Musculoskeletal:      Cervical back: Normal range of motion.   Neurological:      Mental Status: She is alert.          /80   Pulse 88   Ht 170.2 cm (67\")   Wt 73.9 kg (163 lb)   LMP  (LMP Unknown)   SpO2 97%   BMI 25.53 kg/m²     Assessment & Plan   Diagnoses and all orders for this visit:    Fatigue, unspecified type  -     CBC & Differential  -     Comprehensive Metabolic Panel  -     TSH  -     Vitamin D,25-Hydroxy  -     Vitamin B12  -     Folate  -     DEVIKA With / DsDNA, RNP, Sjogrens A / B, Smith  -     C-reactive Protein  -     Cyclic Citrul Peptide Antibody, IgG / IgA  -     Rheumatoid Factor  -     Sedimentation Rate    Polyarthralgia  -     CBC & Differential  -     Comprehensive Metabolic Panel  -     TSH  -     Vitamin D,25-Hydroxy  -     Vitamin B12  -     Folate  -     DEVIKA With / DsDNA, " "RNP, Sjogrens A / B, Jovel  -     C-reactive Protein  -     Cyclic Citrul Peptide Antibody, IgG / IgA  -     Rheumatoid Factor  -     Sedimentation Rate  -     Ambulatory Referral to Physical Therapy Evaluate and treat    Vitamin D deficiency  -     Vitamin D,25-Hydroxy    Chronic pain of both shoulders  -     Ambulatory Referral to Physical Therapy Evaluate and treat    Chronic bilateral low back pain with bilateral sciatica  -     Ambulatory Referral to Physical Therapy Evaluate and treat  -     XR Spine Lumbar 2 or 3 View; Future    Insomnia, unspecified type    Peripheral neuropathy due to chemotherapy    Other orders  -     methylPREDNISolone (MEDROL) 4 MG dose pack; Take as directed on package instructions.  -     doxepin (SINEquan) 25 MG capsule; Take 1 capsule by mouth Every Night.    Patient w/ low back pian as well as \"pain all over\". Will test inflammatory markers. Will restart physical therapy. She has some neuropathic pain from chemotherapy. Suspect osteoarthritis as well. Will test for inflammatory arthropathies. Medrol pack today given acute discomfrot. Will continue doxepin at 20 mg daily for now. Consider switch to amitriptyline v retry gabapentin in the evening. She will have a close follow up.              Answers submitted by the patient for this visit:  Primary Reason for Visit (Submitted on 12/6/2023)  What is the primary reason for your visit?: Back Pain    "

## 2023-12-11 LAB
25(OH)D3+25(OH)D2 SERPL-MCNC: 18.7 NG/ML (ref 30–100)
ALBUMIN SERPL-MCNC: 4.6 G/DL (ref 3.8–4.9)
ALBUMIN/GLOB SERPL: 2 {RATIO} (ref 1.2–2.2)
ALP SERPL-CCNC: 124 IU/L (ref 44–121)
ALT SERPL-CCNC: 21 IU/L (ref 0–32)
ANA SER QL: NEGATIVE
AST SERPL-CCNC: 23 IU/L (ref 0–40)
BASOPHILS # BLD AUTO: 0 X10E3/UL (ref 0–0.2)
BASOPHILS NFR BLD AUTO: 1 %
BILIRUB SERPL-MCNC: 0.3 MG/DL (ref 0–1.2)
BUN SERPL-MCNC: 10 MG/DL (ref 8–27)
BUN/CREAT SERPL: 11 (ref 12–28)
CALCIUM SERPL-MCNC: 9.9 MG/DL (ref 8.7–10.3)
CCP IGA+IGG SERPL IA-ACNC: 1 UNITS (ref 0–19)
CHLORIDE SERPL-SCNC: 103 MMOL/L (ref 96–106)
CO2 SERPL-SCNC: 26 MMOL/L (ref 20–29)
CREAT SERPL-MCNC: 0.93 MG/DL (ref 0.57–1)
CRP SERPL-MCNC: 2 MG/L (ref 0–10)
EGFRCR SERPLBLD CKD-EPI 2021: 70 ML/MIN/1.73
EOSINOPHIL # BLD AUTO: 0.1 X10E3/UL (ref 0–0.4)
EOSINOPHIL NFR BLD AUTO: 1 %
ERYTHROCYTE [DISTWIDTH] IN BLOOD BY AUTOMATED COUNT: 13.5 % (ref 11.7–15.4)
ERYTHROCYTE [SEDIMENTATION RATE] IN BLOOD BY WESTERGREN METHOD: 28 MM/HR (ref 0–40)
FOLATE SERPL-MCNC: 7.7 NG/ML
GLOBULIN SER CALC-MCNC: 2.3 G/DL (ref 1.5–4.5)
GLUCOSE SERPL-MCNC: 75 MG/DL (ref 70–99)
HCT VFR BLD AUTO: 39.3 % (ref 34–46.6)
HGB BLD-MCNC: 13 G/DL (ref 11.1–15.9)
IMM GRANULOCYTES # BLD AUTO: 0 X10E3/UL (ref 0–0.1)
IMM GRANULOCYTES NFR BLD AUTO: 0 %
LYMPHOCYTES # BLD AUTO: 2 X10E3/UL (ref 0.7–3.1)
LYMPHOCYTES NFR BLD AUTO: 32 %
MCH RBC QN AUTO: 27.8 PG (ref 26.6–33)
MCHC RBC AUTO-ENTMCNC: 33.1 G/DL (ref 31.5–35.7)
MCV RBC AUTO: 84 FL (ref 79–97)
MONOCYTES # BLD AUTO: 0.6 X10E3/UL (ref 0.1–0.9)
MONOCYTES NFR BLD AUTO: 10 %
NEUTROPHILS # BLD AUTO: 3.5 X10E3/UL (ref 1.4–7)
NEUTROPHILS NFR BLD AUTO: 56 %
PLATELET # BLD AUTO: 344 X10E3/UL (ref 150–450)
POTASSIUM SERPL-SCNC: 4.8 MMOL/L (ref 3.5–5.2)
PROT SERPL-MCNC: 6.9 G/DL (ref 6–8.5)
RBC # BLD AUTO: 4.68 X10E6/UL (ref 3.77–5.28)
RHEUMATOID FACT SERPL-ACNC: <10 IU/ML
SODIUM SERPL-SCNC: 142 MMOL/L (ref 134–144)
TSH SERPL DL<=0.005 MIU/L-ACNC: 1.5 UIU/ML (ref 0.45–4.5)
VIT B12 SERPL-MCNC: 593 PG/ML (ref 232–1245)
WBC # BLD AUTO: 6.3 X10E3/UL (ref 3.4–10.8)

## 2023-12-12 ENCOUNTER — TELEPHONE (OUTPATIENT)
Dept: INTERNAL MEDICINE | Facility: CLINIC | Age: 58
End: 2023-12-12
Payer: MEDICARE

## 2023-12-12 RX ORDER — MULTIVIT-MIN/IRON/FOLIC ACID/K 18-600-40
2000 CAPSULE ORAL DAILY
Qty: 90 CAPSULE | Refills: 3 | Status: SHIPPED | OUTPATIENT
Start: 2023-12-12

## 2023-12-12 NOTE — TELEPHONE ENCOUNTER
Pt informed    ----- Message from Vandana Zavaleta MD sent at 12/12/2023  4:32 PM EST -----  Normal blood counts. Normal blood glucose. Normal kidney function, b12, thyroid, folate. Negative/ normal inflammatory markers. Patient is noted to have low vitamin d levels. A supplement will be sent to her pharmacy. If she is taking vitamin d every day will modify this supplement.   toma

## 2024-01-02 RX ORDER — AMLODIPINE BESYLATE 2.5 MG/1
2.5 TABLET ORAL DAILY
Qty: 90 TABLET | Refills: 1 | Status: SHIPPED | OUTPATIENT
Start: 2024-01-02

## 2024-01-08 ENCOUNTER — HOSPITAL ENCOUNTER (OUTPATIENT)
Dept: PHYSICAL THERAPY | Facility: HOSPITAL | Age: 59
Setting detail: THERAPIES SERIES
Discharge: HOME | End: 2024-01-08
Payer: MEDICARE

## 2024-01-08 DIAGNOSIS — G89.3 CHRONIC PAIN AFTER CANCER TREATMENT: ICD-10-CM

## 2024-01-08 DIAGNOSIS — M54.42 CHRONIC BILATERAL LOW BACK PAIN WITH BILATERAL SCIATICA: Primary | ICD-10-CM

## 2024-01-08 DIAGNOSIS — G89.29 CHRONIC BILATERAL LOW BACK PAIN WITH BILATERAL SCIATICA: Primary | ICD-10-CM

## 2024-01-08 DIAGNOSIS — M54.6 CHRONIC BILATERAL THORACIC BACK PAIN: ICD-10-CM

## 2024-01-08 DIAGNOSIS — G89.29 CHRONIC BILATERAL THORACIC BACK PAIN: ICD-10-CM

## 2024-01-08 DIAGNOSIS — M54.41 CHRONIC BILATERAL LOW BACK PAIN WITH BILATERAL SCIATICA: Primary | ICD-10-CM

## 2024-01-08 PROCEDURE — 97110 THERAPEUTIC EXERCISES: CPT

## 2024-01-08 PROCEDURE — 97161 PT EVAL LOW COMPLEX 20 MIN: CPT

## 2024-01-08 NOTE — THERAPY EVALUATION
Outpatient Physical Therapy Ortho Initial Evaluation  [facility]     Patient Name: Meenakshi Mae  : 1965  MRN: [id number]  Today's Date: 2024      Visit Date: 2024    Patient Active Problem List   Diagnosis    Chronic constipation    Gastroesophageal reflux disease with esophagitis    Anxiety    Essential hypertension    Malignant neoplasm of upper-outer quadrant of right female breast    Atopic rhinitis    Depression    Hematuria    Neuropathy    Mild intermittent asthma without complication    Vitamin D deficiency    Healthcare maintenance    Osteopenia    Bulge of lumbar disc without myelopathy    Lumbar facet arthropathy    Synovial cyst of lumbar facet joint        Past Medical History:   Diagnosis Date    Anemia     Arthritis of back     Bulge of lumbar disc without myelopathy 07/10/2017    CTS (carpal tunnel syndrome)     Depression     H/O transfusion of packed red blood cells     Headache     Healthcare maintenance 2016    History of low back pain     Malignant neoplasm of breast 2013    Right, T2N1M0, Stage IIB    Thoracic disc disorder         Past Surgical History:   Procedure Laterality Date    BREAST BIOPSY Right     HAND SURGERY Left 2015    HYSTERECTOMY  2008    MASTECTOMY MODIFIED RADICAL Right 2013    TRIGGER POINT INJECTION  2016       Visit Dx:     ICD-10-CM ICD-9-CM   1. Chronic bilateral low back pain with bilateral sciatica  M54.42 724.2    M54.41 724.3    G89.29 338.29   2. Chronic bilateral thoracic back pain  M54.6 724.1    G89.29 338.29   3. Chronic pain after cancer treatment  G89.3 338.3          Patient History       Row Name 24 1000             History    Brief Description of Current Complaint Pt reporting chronic pain for past 10 years since completing chemo/radiation. Pain has been increasing overtime and should be resolved per MD. It is always there, however able to push through it. Dealing with pain daily, was taking pain pills  "and were not working well. PT taking muscle relaxers at this time. Pain is getting worse each winter, given steriods at most recent MD appointment which helped but are wearing off. History of breast CA, mastectomy on R 11 years ago. Most pain in back and into LEs, hands, foot.  -CN      What clinical tests have you had for this problem? X-ray;MRI  -CN         Pain     Pain Location Back;Leg  -CN      Pain at Present --  \"I have a high pain tolerance for pain and would give it a 12\"  -CN      Pain Frequency Constant/continuous  -CN      Pain Description Aching;Stabbing  -CN      What Performance Factors Make the Current Problem(s) WORSE? No certain positions which increase  -CN      What Performance Factors Make the Current Problem(s) BETTER? steriods, muscle relaxers, walking  -CN      Is your sleep disturbed? No  -CN      Difficulties at work? Retired, senior working program. .  -CN      Difficulties with ADL's? Able to perform all ADLs with increased pain  -CN      Difficulties with recreational activities? Previously enjoyed running, walks with dog regularly  -CN         Fall Risk Assessment    Any falls in the past year: No  -CN         Services    Are you currently receiving Home Health services No  -CN         Daily Activities    Primary Language English  -CN      Are you able to read Yes  -CN      Are you able to write Yes  -CN      How does patient learn best? Listening;Reading;Demonstration  -CN      Barriers to learning None  -CN      Pt Participated in POC and Goals Yes  -CN         Safety    Are you being hurt, hit, or frightened by anyone at home or in your life? No  -CN      Are you being neglected by a caregiver No  -CN                User Key  (r) = Recorded By, (t) = Taken By, (c) = Cosigned By      Initials Name Provider Type    Kavita Dover, PT Physical Therapist                     PT Ortho       Row Name 01/08/24 1100       Posture/Observations    Alignment Options " Forward head;Rounded shoulders  -CN    Forward Head Mild  -CN    Rounded Shoulders Mild  -CN       Myotomal Screen- Lower Quarter Clearing    Hip flexion (L2) Bilateral:;4+ (Good +)  -CN    Knee extension (L3) Bilateral:;5 (Normal)  -CN    Ankle DF (L4) Bilateral:;5 (Normal)  -CN    Ankle PF (S1) Bilateral:;5 (Normal)  -CN    Knee flexion (S2) Bilateral:;4+ (Good +)  -CN       Lumbar ROM Screen- Lower Quarter Clearing    Lumbar Flexion Normal  -CN    Lumbar Extension Normal  -CN    Lumbar Lateral Flexion Normal  -CN    Lumbar Rotation --  B=75%, slight pain  -CN       Lumbosacral Accessory Motions    PA [place]- L3 Hypomobile  -CN    PA [place]- L4 Hypomobile  -CN       Lumbosacral Palpation    Piriformis Right:;Guarded/taut  -CN    Quadratus Lumborum Right:;Tender  -CN    Erector Spinae (Paraspinals) Right:;Tender  -CN       MMT (Manual Muscle Testing)    Rt Lower Ext Rt Hip ABduction  -CN    Lt Lower Ext Lt Hip ABduction  -CN       MMT Right Lower Ext    Rt Hip ABduction MMT, Gross Movement (4-/5) good minus  -CN       MMT Left Lower Ext    Lt Hip ABduction MMT, Gross Movement (4/5) good  -CN       Lower Extremity Flexibility    Hip Flexors Bilateral:;Mildly limited  -CN              User Key  (r) = Recorded By, (t) = Taken By, (c) = Cosigned By      Initials Name Provider Type    Kavita Dover, PT Physical Therapist                                Therapy Education  Education Details: Access code: [email or web address], anatomy, goals of PT, eval findings, prognosis  Given: HEP, Symptoms/condition management, Pain management  Program: New  How Provided: Verbal, Demonstration, Written  Provided to: Patient  Level of Understanding: Teach back education performed, Verbalized, Demonstrated      PT OP Goals       Row Name 01/08/24 1100          PT Short Term Goals    STG Date to Achieve 02/08/24  -CN     STG 1 Pt will report pain rated 6/10 at worst in order to demonstrate ability to return to normalized ADLs and  functional activities.  -CN     STG 1 Progress New  -CN     STG 2 Pt will be independent with initial HEP for symptom management.  -CN     STG 2 Progress New  -CN        Long Term Goals    LTG Date to Achieve 03/08/24  -CN     LTG 1 Pt will be independent and compliant with advanced HEP for long term management of symptoms and prevention of future occurrence.  -CN     LTG 1 Progress New  -CN     LTG 2 Pt will reduce level of perceived disability as measured by the Modified oswestry  to 30% in order to improve QOL.  -CN     LTG 2 Progress New  -CN     LTG 3 Pt will demonstrate B hip abd strength to 4+/5 in order to increase stability with ADLs.  -CN     LTG 3 Progress New  -CN     LTG 4 Pt will report 50% reduction in pain with ADLs in order to improve QOL.  -CN     LTG 4 Progress New  -CN        Time Calculation    PT Goal Re-Cert Due Date 04/08/24  -CN               User Key  (r) = Recorded By, (t) = Taken By, (c) = Cosigned By      Initials Name Provider Type    Kavita Dover, PT Physical Therapist                     PT Assessment/Plan       Row Name 01/08/24 1127          PT Assessment    Functional Limitations Limitations in community activities;Limitations in functional capacity and performance;Limitation in home management;Performance in self-care ADL  -CN     Impairments Impaired flexibility;Joint mobility;Muscle strength;Pain;Posture;Range of motion  -CN     Assessment Comments 58 y.o. female referred to outpatient physical therapy for evaluation and treatment of back pain, B shoulder pain and LE pain.  Patient presents with history of chronic pain, worsening over past 11 years since chemo/radiation due to breast CA. Pt with dec hip strength B, slight increased symptoms into lumbar rotation, good hip/knee flexibility. Pt's signs and symptoms are consistent with referring diagnosis.  Pertinent comorbidities and personal factors that may affect progress include, but are not limited to, chronicity  of symptoms, history R mastectomy.  Pt scored 52% on the Modified oswestry where 0% is no disability and is in stable clinical condition. Pt would benefit from skilled PT to address functional deficits and return to PLOF.  -CN     Please refer to paper survey for additional self-reported information No  -CN     Rehab Potential Good  -CN     Patient/caregiver participated in establishment of treatment plan and goals Yes  -CN     Patient would benefit from skilled therapy intervention Yes  -CN        PT Plan    PT Frequency 1x/week;2x/week  -CN     Predicted Duration of Therapy Intervention (PT) 8-10 visits  -CN     Planned CPT's? PT EVAL LOW COMPLEXITY: 94168;PT RE-EVAL: 22408;PT THER PROC EA 15 MIN: 48473;PT THER ACT EA 15 MIN: 57558;PT MANUAL THERAPY EA 15 MIN: 15403;PT NEUROMUSC RE-EDUCATION EA 15 MIN: 16007;PT GAIT TRAINING EA 15 MIN: 42825;PT AQUATIC THERAPY EA 15 MIN: 13140;PT SELF CARE/HOME MGMT/TRAIN EA 15: 68519;PT HOT OR COLD PACK TREAT MCARE;PT TRACTION LUMBAR: 66376  -CN     PT Plan Comments Assess response to evaluation and consider Nustep warm up, SL clamshell, qped TA, cat/camel, AR, resisted sidestepping next visit. Pt also interested in DDN, may consider in upcoming appointments per relief with DDN in the past.  -CN               User Key  (r) = Recorded By, (t) = Taken By, (c) = Cosigned By      Initials Name Provider Type    Kavita Dover, PT Physical Therapist                       OP Exercises       Row Name 01/08/24 1000             Total Minutes    21544 - PT Therapeutic Exercise Minutes 10  -CN         Exercise 1    Exercise Name 1 LTR  -CN      Cueing 1 Verbal;Demo  -CN      Reps 1 10  -CN      Time 1 5 sec  -CN         Exercise 2    Exercise Name 2 SL arcs  -CN      Cueing 2 Verbal;Demo  -CN      Reps 2 10 B  -CN         Exercise 3    Exercise Name 3 Seated swiss ball roll out  -CN      Cueing 3 Verbal;Demo  -CN      Reps 3 10  -CN      Time 3 5 sec  -CN                User Key   (r) = Recorded By, (t) = Taken By, (c) = Cosigned By      Initials Name Provider Type    Kavita Dover, PT Physical Therapist                                  Outcome Measure Options: Modified Oswestry, Quick DASH  Quick DASH  Open a tight or new jar.: Moderate Difficulty  Do heavy household chores (e.g., wash walls, wash floors): Moderate Difficulty  Carry a shopping bag or briefcase: Moderate Difficulty  Wash your back: Moderate Difficulty  Use a knife to cut food: Moderate Difficulty  Recreational activities in which you take some force or impact through your arm, should or hand (e.g. golf, hammering, tennis, etc.): No Difficulty  During the past week, to what extent has your arm, shoulder, or hand problem interfered with your normal social activites with family, friends, neighbors or groups?: Moderately  During the past week, were you limited in your work or other regular daily activities as a result of your arm, shoulder or hand problem?: Moderately Limited  Arm, Shoulder, or hand pain: Moderate  Tingling (pins and needles) in your arm, shoulder, or hand: Moderate  During the past week, how much difficulty have you had sleeping because of the pain in your arm, shoulder or hand?: Mild Difficulty  Number of Questions Answered: 11  Quick DASH Score: 43.18  Modified Oswestry  Modified Oswestry Score/Comments: 52% where 0% is no disability      Time Calculation:     Start Time: 1008  Stop Time: 1048  Time Calculation (min): 40 min  Timed Charges  60728 - PT Therapeutic Exercise Minutes: 10  Total Minutes  Timed Charges Total Minutes: 10   Total Minutes: 10     Therapy Charges for Today       Code Description Service Date Service Provider Modifiers Qty    87007128554 HC PT THER PROC EA 15 MIN 1/8/2024 Kavita Blackburn, PT GP 1    95035234794 HC PT EVAL LOW COMPLEXITY 2 1/8/2024 Kavita Blackburn, PT GP 1            PT G-Codes  Outcome Measure Options: Modified Oswestry, Quick DASH  Quick  DASH Score: 43.18  Total: (P) 58  Modified Oswestry Score/Comments: 52% where 0% is no disability         Kavita Blackburn, PT  1/8/2024

## 2024-03-05 DIAGNOSIS — F41.9 ANXIETY: ICD-10-CM

## 2024-03-05 RX ORDER — AMLODIPINE BESYLATE 2.5 MG/1
2.5 TABLET ORAL DAILY
Qty: 90 TABLET | Refills: 1 | Status: SHIPPED | OUTPATIENT
Start: 2024-03-05

## 2024-03-05 RX ORDER — CLONAZEPAM 0.5 MG/1
0.5 TABLET ORAL 2 TIMES DAILY PRN
Qty: 60 TABLET | Refills: 1 | Status: SHIPPED | OUTPATIENT
Start: 2024-03-05

## 2024-03-05 RX ORDER — DOXEPIN HYDROCHLORIDE 25 MG/1
25 CAPSULE ORAL NIGHTLY
Qty: 90 CAPSULE | Refills: 2 | Status: SHIPPED | OUTPATIENT
Start: 2024-03-05

## 2024-03-05 NOTE — TELEPHONE ENCOUNTER
Caller: Meenakshi Mae    Relationship: Self    Best call back number: [phone]     Requested Prescriptions:   Requested Prescriptions     Pending Prescriptions Disp Refills    amLODIPine (NORVASC) 2.5 MG tablet 90 tablet 1     Sig: Take 1 tablet by mouth Daily.    clonazePAM (KlonoPIN) 0.5 MG tablet 60 tablet 1     Sig: Take 1 tablet by mouth 2 (Two) Times a Day As Needed for Anxiety.    doxepin (SINEquan) 25 MG capsule 90 capsule 2     Sig: Take 1 capsule by mouth Every Night.        Pharmacy where request should be sent: [facility] [id number] [address] AT [address] [place] [address] - [phone] [facility] [phone]      Last office visit with prescribing clinician: 12/8/2023   Last telemedicine visit with prescribing clinician: Visit date not found   Next office visit with prescribing clinician: 4/9/2024     Additional details provided by patient:     Does the patient have less than a 3 day supply:  [] Yes  [] No    Would you like a call back once the refill request has been completed: [] Yes [] No    If the office needs to give you a call back, can they leave a voicemail: [] Yes [] No    Epifanio Valerio Rep   03/05/24 14:32 EST

## 2024-03-28 DIAGNOSIS — I10 ESSENTIAL HYPERTENSION: Primary | ICD-10-CM

## 2024-03-28 DIAGNOSIS — M85.80 OSTEOPENIA, UNSPECIFIED LOCATION: ICD-10-CM

## 2024-03-28 DIAGNOSIS — Z00.00 HEALTHCARE MAINTENANCE: ICD-10-CM

## 2024-04-03 DIAGNOSIS — F41.9 ANXIETY: ICD-10-CM

## 2024-04-03 RX ORDER — DOXEPIN HYDROCHLORIDE 25 MG/1
25 CAPSULE ORAL NIGHTLY
Qty: 90 CAPSULE | Refills: 2 | Status: SHIPPED | OUTPATIENT
Start: 2024-04-03

## 2024-04-03 RX ORDER — PAROXETINE HYDROCHLORIDE 40 MG/1
40 TABLET, FILM COATED ORAL EVERY MORNING
Qty: 90 TABLET | Refills: 3 | Status: SHIPPED | OUTPATIENT
Start: 2024-04-03

## 2024-04-03 RX ORDER — CLONAZEPAM 0.5 MG/1
0.5 TABLET ORAL 2 TIMES DAILY PRN
Qty: 60 TABLET | Refills: 1 | Status: SHIPPED | OUTPATIENT
Start: 2024-04-03

## 2024-04-03 RX ORDER — FAMOTIDINE 40 MG/1
40 TABLET, FILM COATED ORAL
Qty: 90 TABLET | Refills: 3 | Status: SHIPPED | OUTPATIENT
Start: 2024-04-03

## 2024-04-03 RX ORDER — ROSUVASTATIN CALCIUM 20 MG/1
20 TABLET, COATED ORAL DAILY
Qty: 90 TABLET | Refills: 1 | Status: SHIPPED | OUTPATIENT
Start: 2024-04-03

## 2024-04-03 RX ORDER — ALBUTEROL SULFATE 90 UG/1
2 AEROSOL, METERED RESPIRATORY (INHALATION) EVERY 4 HOURS PRN
Qty: 18 G | Refills: 3 | Status: SHIPPED | OUTPATIENT
Start: 2024-04-03

## 2024-04-03 RX ORDER — BUPROPION HYDROCHLORIDE 150 MG/1
150 TABLET ORAL DAILY
Qty: 90 TABLET | Refills: 1 | Status: SHIPPED | OUTPATIENT
Start: 2024-04-03

## 2024-04-03 RX ORDER — AMLODIPINE BESYLATE 2.5 MG/1
2.5 TABLET ORAL DAILY
Qty: 90 TABLET | Refills: 1 | Status: SHIPPED | OUTPATIENT
Start: 2024-04-03

## 2024-04-03 RX ORDER — CYCLOBENZAPRINE HCL 10 MG
10 TABLET ORAL 2 TIMES DAILY PRN
Qty: 30 TABLET | Refills: 4 | Status: SHIPPED | OUTPATIENT
Start: 2024-04-03

## 2024-04-03 NOTE — TELEPHONE ENCOUNTER
Caller: Meenakshi Mae    Relationship: Self    Best call back number: [phone]    Requested Prescriptions:   Requested Prescriptions     Pending Prescriptions Disp Refills    PARoxetine (PAXIL) 40 MG tablet 90 tablet 3     Sig: Take 1 tablet by mouth Every Morning.    doxepin (SINEquan) 25 MG capsule 90 capsule 2     Sig: Take 1 capsule by mouth Every Night.    amLODIPine (NORVASC) 2.5 MG tablet 90 tablet 1     Sig: Take 1 tablet by mouth Daily.    buPROPion XL (WELLBUTRIN XL) 150 MG 24 hr tablet 90 tablet 1     Sig: Take 1 tablet by mouth Daily.    famotidine (PEPCID) 40 MG tablet 90 tablet 3     Sig: Take 1 tablet by mouth every night at bedtime.    rosuvastatin (CRESTOR) 20 MG tablet 90 tablet 1     Sig: Take 1 tablet by mouth Daily.    albuterol sulfate  (90 Base) MCG/ACT inhaler 18 g 3     Sig: Inhale 2 puffs Every 4 (Four) Hours As Needed for Wheezing.    cyclobenzaprine (FLEXERIL) 10 MG tablet 30 tablet 4     Sig: Take 1 tablet by mouth 2 (Two) Times a Day As Needed for Muscle Spasms.    clonazePAM (KlonoPIN) 0.5 MG tablet 60 tablet 1     Sig: Take 1 tablet by mouth 2 (Two) Times a Day As Needed for Anxiety.        Pharmacy where request should be sent: [facility] [id number] [address] AT [address] [place] [address] [phone] [facility] [phone]      Last office visit with prescribing clinician: 12/8/2023   Last telemedicine visit with prescribing clinician: Visit date not found   Next office visit with prescribing clinician: 4/9/2024     Additional details provided by patient: PATIENT IS ALSO NEEDING diclofenac (VOLTAREN) 1 % gel gel    Does the patient have less than a 3 day supply:  [x] Yes  [] No    Would you like a call back once the refill request has been completed: [] Yes [] No    If the office needs to give you a call back, can they leave a voicemail: [x] Yes [] No    Epifanio Lozada   04/03/24 12:07 EDT

## 2024-04-04 LAB
ALBUMIN SERPL-MCNC: 4.6 G/DL (ref 3.5–5.2)
ALBUMIN/GLOB SERPL: 2 G/DL
ALP SERPL-CCNC: 133 U/L (ref 39–117)
ALT SERPL-CCNC: 35 U/L (ref 1–33)
APPEARANCE UR: CLEAR
AST SERPL-CCNC: 25 U/L (ref 1–32)
BACTERIA #/AREA URNS HPF: ABNORMAL /[HPF]
BASOPHILS # BLD AUTO: 0.05 10*3/MM3 (ref 0–0.2)
BASOPHILS NFR BLD AUTO: 0.8 % (ref 0–1.5)
BILIRUB SERPL-MCNC: 0.3 MG/DL (ref 0–1.2)
BILIRUB UR QL STRIP: NEGATIVE
BUN SERPL-MCNC: 6 MG/DL (ref 8–23)
BUN/CREAT SERPL: 7.1 (ref 7–25)
CALCIUM SERPL-MCNC: 9.7 MG/DL (ref 8.6–10.5)
CASTS URNS QL MICRO: ABNORMAL /LPF
CHLORIDE SERPL-SCNC: 106 MMOL/L (ref 98–107)
CHOLEST SERPL-MCNC: 113 MG/DL (ref 0–200)
CO2 SERPL-SCNC: 25.6 MMOL/L (ref 22–29)
COLOR UR: YELLOW
CREAT SERPL-MCNC: 0.84 MG/DL (ref 0.57–1)
EGFRCR SERPLBLD CKD-EPI 2021: 79.7 ML/MIN/1.73
EOSINOPHIL # BLD AUTO: 0.15 10*3/MM3 (ref 0–0.4)
EOSINOPHIL NFR BLD AUTO: 2.4 % (ref 0.3–6.2)
EPI CELLS #/AREA URNS HPF: >10 /HPF (ref 0–10)
ERYTHROCYTE [DISTWIDTH] IN BLOOD BY AUTOMATED COUNT: 13.9 % (ref 12.3–15.4)
GLOBULIN SER CALC-MCNC: 2.3 GM/DL
GLUCOSE SERPL-MCNC: 164 MG/DL (ref 65–99)
GLUCOSE UR QL STRIP: NEGATIVE
HCT VFR BLD AUTO: 39.1 % (ref 34–46.6)
HDLC SERPL-MCNC: 67 MG/DL (ref 40–60)
HGB BLD-MCNC: 12.6 G/DL (ref 12–15.9)
HGB UR QL STRIP: NEGATIVE
IMM GRANULOCYTES # BLD AUTO: 0.03 10*3/MM3 (ref 0–0.05)
IMM GRANULOCYTES NFR BLD AUTO: 0.5 % (ref 0–0.5)
KETONES UR QL STRIP: ABNORMAL
LDLC SERPL CALC-MCNC: 27 MG/DL (ref 0–100)
LEUKOCYTE ESTERASE UR QL STRIP: NEGATIVE
LYMPHOCYTES # BLD AUTO: 2.27 10*3/MM3 (ref 0.7–3.1)
LYMPHOCYTES NFR BLD AUTO: 36.1 % (ref 19.6–45.3)
MCH RBC QN AUTO: 27.2 PG (ref 26.6–33)
MCHC RBC AUTO-ENTMCNC: 32.2 G/DL (ref 31.5–35.7)
MCV RBC AUTO: 84.4 FL (ref 79–97)
MICRO URNS: ABNORMAL
MICRO URNS: ABNORMAL
MONOCYTES # BLD AUTO: 0.48 10*3/MM3 (ref 0.1–0.9)
MONOCYTES NFR BLD AUTO: 7.6 % (ref 5–12)
NEUTROPHILS # BLD AUTO: 3.3 10*3/MM3 (ref 1.7–7)
NEUTROPHILS NFR BLD AUTO: 52.6 % (ref 42.7–76)
NITRITE UR QL STRIP: NEGATIVE
NRBC BLD AUTO-RTO: 0 /100 WBC (ref 0–0.2)
PH UR STRIP: 7 [PH] (ref 5–7.5)
PLATELET # BLD AUTO: 309 10*3/MM3 (ref 140–450)
POTASSIUM SERPL-SCNC: 4.3 MMOL/L (ref 3.5–5.2)
PROT SERPL-MCNC: 6.9 G/DL (ref 6–8.5)
PROT UR QL STRIP: ABNORMAL
RBC # BLD AUTO: 4.63 10*6/MM3 (ref 3.77–5.28)
RBC #/AREA URNS HPF: ABNORMAL /HPF (ref 0–2)
SODIUM SERPL-SCNC: 141 MMOL/L (ref 136–145)
SP GR UR STRIP: 1.02 (ref 1–1.03)
TRIGL SERPL-MCNC: 102 MG/DL (ref 0–150)
TSH SERPL DL<=0.005 MIU/L-ACNC: 1.18 UIU/ML (ref 0.27–4.2)
URINALYSIS REFLEX: ABNORMAL
UROBILINOGEN UR STRIP-MCNC: 1 MG/DL (ref 0.2–1)
VLDLC SERPL CALC-MCNC: 19 MG/DL (ref 5–40)
WBC # BLD AUTO: 6.28 10*3/MM3 (ref 3.4–10.8)
WBC #/AREA URNS HPF: ABNORMAL /HPF (ref 0–5)

## 2024-04-05 DIAGNOSIS — R73.01 IMPAIRED FASTING GLUCOSE: Primary | ICD-10-CM

## 2024-04-09 ENCOUNTER — OFFICE VISIT (OUTPATIENT)
Dept: INTERNAL MEDICINE | Facility: CLINIC | Age: 59
End: 2024-04-09
Payer: MEDICARE

## 2024-04-09 VITALS
HEIGHT: 67 IN | BODY MASS INDEX: 27.62 KG/M2 | OXYGEN SATURATION: 98 % | HEART RATE: 106 BPM | DIASTOLIC BLOOD PRESSURE: 80 MMHG | WEIGHT: 176 LBS | SYSTOLIC BLOOD PRESSURE: 140 MMHG

## 2024-04-09 DIAGNOSIS — R79.89 ABNORMAL LFTS: ICD-10-CM

## 2024-04-09 DIAGNOSIS — Z13.6 SCREENING FOR CARDIOVASCULAR CONDITION: ICD-10-CM

## 2024-04-09 DIAGNOSIS — R73.01 IFG (IMPAIRED FASTING GLUCOSE): ICD-10-CM

## 2024-04-09 DIAGNOSIS — Z00.00 HEALTHCARE MAINTENANCE: Primary | ICD-10-CM

## 2024-04-09 DIAGNOSIS — Z78.0 MENOPAUSE: ICD-10-CM

## 2024-04-09 DIAGNOSIS — N64.9 BREAST LESION: ICD-10-CM

## 2024-04-09 PROCEDURE — G0439 PPPS, SUBSEQ VISIT: HCPCS | Performed by: INTERNAL MEDICINE

## 2024-04-09 PROCEDURE — 99214 OFFICE O/P EST MOD 30 MIN: CPT | Performed by: INTERNAL MEDICINE

## 2024-04-09 PROCEDURE — 3079F DIAST BP 80-89 MM HG: CPT | Performed by: INTERNAL MEDICINE

## 2024-04-09 PROCEDURE — 1160F RVW MEDS BY RX/DR IN RCRD: CPT | Performed by: INTERNAL MEDICINE

## 2024-04-09 PROCEDURE — 3077F SYST BP >= 140 MM HG: CPT | Performed by: INTERNAL MEDICINE

## 2024-04-09 PROCEDURE — 1159F MED LIST DOCD IN RCRD: CPT | Performed by: INTERNAL MEDICINE

## 2024-04-09 RX ORDER — DOXEPIN HYDROCHLORIDE 10 MG/1
10 CAPSULE ORAL NIGHTLY
Qty: 90 CAPSULE | Refills: 3 | Status: SHIPPED | OUTPATIENT
Start: 2024-04-09

## 2024-04-09 RX ORDER — ROSUVASTATIN CALCIUM 20 MG/1
10 TABLET, COATED ORAL DAILY
Start: 2024-04-09

## 2024-04-09 RX ORDER — MUPIROCIN CALCIUM 20 MG/G
1 CREAM TOPICAL 3 TIMES DAILY
Qty: 15 G | Refills: 0 | Status: SHIPPED | OUTPATIENT
Start: 2024-04-09

## 2024-04-09 NOTE — PROGRESS NOTES
The ABCs of the Annual Wellness Visit  Subsequent Medicare Wellness Visit    Subjective    Meenakshi Mae is a 58 y.o. female who presents for a Subsequent Medicare Wellness Visit.    The following portions of the patient's history were reviewed and   updated as appropriate: allergies, current medications, past family history, past medical history, past social history, past surgical history, and problem list.    Compared to one year ago, the patient feels her physical   health is the same.    Compared to one year ago, the patient feels her mental   health is the same.    Recent Hospitalizations:  She was not admitted to the hospital during the last year.       Current Medical Providers:  Patient Care Team:  Vandana Zavaleta MD as PCP - General (Internal Medicine)  Code, Osmin MCCOLLUM II, MD as Consulting Physician (Hematology and Oncology)  Vandana Zavaleta MD as Referring Physician (Internal Medicine)    Outpatient Medications Prior to Visit   Medication Sig Dispense Refill    albuterol sulfate  (90 Base) MCG/ACT inhaler Inhale 2 puffs Every 4 (Four) Hours As Needed for Wheezing. 18 g 3    amLODIPine (NORVASC) 2.5 MG tablet Take 1 tablet by mouth Daily. 90 tablet 1    buPROPion XL (WELLBUTRIN XL) 150 MG 24 hr tablet Take 1 tablet by mouth Daily. 90 tablet 1    Cholecalciferol (Vitamin D) 50 MCG (2000 UT) capsule Take 1 capsule by mouth Daily. 90 capsule 3    clonazePAM (KlonoPIN) 0.5 MG tablet Take 1 tablet by mouth 2 (Two) Times a Day As Needed for Anxiety. 60 tablet 1    cyclobenzaprine (FLEXERIL) 10 MG tablet Take 1 tablet by mouth 2 (Two) Times a Day As Needed for Muscle Spasms. 30 tablet 4    diclofenac (VOLTAREN) 1 % gel gel APPLY 4 GRAMS TO AFFECTED AREA(S) FOUR TIMES A DAY 1 tube 4    famotidine (PEPCID) 40 MG tablet Take 1 tablet by mouth every night at bedtime. 90 tablet 3    fluticasone (FLONASE) 50 MCG/ACT nasal spray SPRAY 2 SPRAYS INTO THE NOSTRIL DAILY AS DIRECTED BY PROVIDER 48 mL 1    PARoxetine  "(PAXIL) 40 MG tablet Take 1 tablet by mouth Every Morning. 90 tablet 3    rosuvastatin (CRESTOR) 20 MG tablet Take 1 tablet by mouth Daily. 90 tablet 1    vitamin B-12 (CYANOCOBALAMIN) 1000 MCG tablet Take 1 tablet by mouth Daily. 90 tablet 3    doxepin (SINEquan) 25 MG capsule Take 1 capsule by mouth Every Night. 90 capsule 2    methylPREDNISolone (MEDROL) 4 MG dose pack Take as directed on package instructions. (Patient not taking: Reported on 4/9/2024) 21 tablet 0     No facility-administered medications prior to visit.       No opioid medication identified on active medication list. I have reviewed chart for other potential  high risk medication/s and harmful drug interactions in the elderly.        Aspirin is not on active medication list.  Aspirin use is not indicated based on review of current medical condition/s. Risk of harm outweighs potential benefits.  .    Patient Active Problem List   Diagnosis    Chronic constipation    Gastroesophageal reflux disease with esophagitis    Anxiety    Essential hypertension    Malignant neoplasm of upper-outer quadrant of right female breast    Atopic rhinitis    Depression    Hematuria    Neuropathy    Mild intermittent asthma without complication    Vitamin D deficiency    Healthcare maintenance    Osteopenia    Bulge of lumbar disc without myelopathy    Lumbar facet arthropathy    Synovial cyst of lumbar facet joint     Advance Care Planning   Advance Care Planning     Advance Directive is not on file.  ACP discussion was held with the patient during this visit. Patient does not have an advance directive, information provided.     Objective    Vitals:    04/09/24 1045   BP: 140/80   Pulse: 106   SpO2: 98%   Weight: 79.8 kg (176 lb)   Height: 170.2 cm (67\")     Estimated body mass index is 27.57 kg/m² as calculated from the following:    Height as of this encounter: 170.2 cm (67\").    Weight as of this encounter: 79.8 kg (176 lb).           Does the patient have " evidence of cognitive impairment? No    Lab Results   Component Value Date    CHLPL 113 2024    TRIG 102 2024    HDL 67 (H) 2024    LDL 27 2024    VLDL 19 2024        HEALTH RISK ASSESSMENT    Smoking Status:  Social History     Tobacco Use   Smoking Status Former    Current packs/day: 0.00    Types: Cigarettes    Start date: 1979    Quit date: 1980    Years since quittin.7   Smokeless Tobacco Former   Tobacco Comments    Smoked from age [age].     Alcohol Consumption:  Social History     Substance and Sexual Activity   Alcohol Use Yes    Alcohol/week: 1.0 standard drink of alcohol    Types: 1 Glasses of wine per week    Comment: Occasional     Fall Risk Screen:    CANDI Fall Risk Assessment was completed, and patient is at LOW risk for falls.Assessment completed on:2024    Depression Screenin/9/2024    10:48 AM   PHQ-2/PHQ-9 Depression Screening   Little Interest or Pleasure in Doing Things 0-->not at all   Feeling Down, Depressed or Hopeless 0-->not at all   PHQ-9: Brief Depression Severity Measure Score 0       Health Habits and Functional and Cognitive Screenin/29/2020     8:49 AM   Functional & Cognitive Status   Do you have difficulty preparing food and eating? No   Do you have difficulty bathing yourself, getting dressed or grooming yourself? No   Do you have difficulty using the toilet? No   Do you have difficulty moving around from place to place? No   Do you have trouble with steps or getting out of a bed or a chair? No   Current Diet Well Balanced Diet   Dental Exam Up to date   Eye Exam Not up to date   Exercise (times per week) 7 times per week   Current Exercise Activities Include Walking   Do you need help using the phone?  No   Are you deaf or do you have serious difficulty hearing?  No   Do you need help to go to places out of walking distance? No   Do you need help shopping? No   Do you need help preparing meals?  No   Do you need  help with housework?  No   Do you need help with laundry? No   Do you need help taking your medications? No   Do you need help managing money? No   Do you ever drive or ride in a car without wearing a seat belt? No   Have you felt unusual stress, anger or loneliness in the last month? No   Who do you live with? Spouse   If you need help, do you have trouble finding someone available to you? No   Have you been bothered in the last four weeks by sexual problems? No   Do you have difficulty concentrating, remembering or making decisions? No       Age-appropriate Screening Schedule:  Refer to the list below for future screening recommendations based on patient's age, sex and/or medical conditions. Orders for these recommended tests are listed in the plan section. The patient has been provided with a written plan.    Health Maintenance   Topic Date Due    ZOSTER VACCINE (1 of 2) Never done    RSV Vaccine - Adults (1 - 1-dose 60+ series) Never done    DXA SCAN  08/26/2023    COVID-19 Vaccine (8 - 2023-24 season) 12/26/2023    INFLUENZA VACCINE  08/01/2024    MAMMOGRAM  08/03/2024    BMI FOLLOWUP  12/08/2024    COLORECTAL CANCER SCREENING  01/19/2025    LIPID PANEL  04/03/2025    ANNUAL WELLNESS VISIT  04/09/2025    TDAP/TD VACCINES (3 - Td or Tdap) 07/19/2032    HEPATITIS C SCREENING  Completed    Pneumococcal Vaccine 0-64  Completed    PAP SMEAR  Discontinued                  CMS Preventative Services Quick Reference  Risk Factors Identified During Encounter  Immunizations Discussed/Encouraged: Shingrix  The above risks/problems have been discussed with the patient.  Pertinent information has been shared with the patient in the After Visit Summary.  An After Visit Summary and PPPS were made available to the patient.    Follow Up:   Next Medicare Wellness visit to be scheduled in 1 year.       Additional E&M Note during same encounter follows:  Patient has multiple medical problems which are significant and separately  "identifiable that require additional work above and beyond the Medicare Wellness Visit.      Chief Complaint  Annual Exam    Subjective        HPI  Meenakshi Mae is also being seen today for awv, to review chornic issues and to addresss acute needs. Patient has weight concerns. She has either coffee or smoothie am then does not eat until 4 pm then again before bed and often in the middle of the night.          Objective   Vital Signs:  /80   Pulse 106   Ht 170.2 cm (67\")   Wt 79.8 kg (176 lb)   SpO2 98%   BMI 27.57 kg/m²     Physical Exam  Vitals and nursing note reviewed.   Constitutional:       Appearance: Normal appearance. She is well-developed.   HENT:      Head: Normocephalic and atraumatic.      Right Ear: Tympanic membrane and external ear normal.      Left Ear: Tympanic membrane and external ear normal.      Nose: Nose normal.      Mouth/Throat:      Mouth: Mucous membranes are moist.   Eyes:      Extraocular Movements: Extraocular movements intact.      Pupils: Pupils are equal, round, and reactive to light.   Cardiovascular:      Rate and Rhythm: Normal rate and regular rhythm.      Pulses: Normal pulses.      Heart sounds: Normal heart sounds.   Pulmonary:      Effort: Pulmonary effort is normal. No respiratory distress.      Breath sounds: Normal breath sounds.   Abdominal:      General: Abdomen is flat.      Palpations: Abdomen is soft.   Musculoskeletal:         General: Normal range of motion.      Cervical back: Normal range of motion and neck supple.   Skin:     General: Skin is warm and dry.   Neurological:      General: No focal deficit present.      Mental Status: She is alert and oriented to person, place, and time.   Psychiatric:         Mood and Affect: Mood normal.         Behavior: Behavior normal.         Thought Content: Thought content normal.         Judgment: Judgment normal.          The following data was reviewed by: Vandana Zavaleta MD on 04/09/2024:  CMP          " 7/31/2023    14:31 12/8/2023    10:01 4/3/2024    10:33   CMP   Glucose 94  75  164    BUN 8  10  6    Creatinine 0.72  0.93  0.84    Sodium 141  142  141    Potassium 4.8  4.8  4.3    Chloride 100  103  106    Calcium 10.1  9.9  9.7    Total Protein 7.2  6.9  6.9    Albumin 5.1  4.6  4.6    Globulin 2.1  2.3  2.3    Total Bilirubin 0.5  0.3  0.3    Alkaline Phosphatase 109  124  133    AST (SGOT) 21  23  25    ALT (SGPT) 26  21  35    BUN/Creatinine Ratio 11.1  11  7.1      CBC w/diff          7/31/2023    14:31 12/8/2023    10:01 4/3/2024    10:33   CBC w/Diff   WBC 5.11  6.3  6.28    RBC 4.79  4.68  4.63    Hemoglobin 13.2  13.0  12.6    Hematocrit 40.8  39.3  39.1    MCV 85.2  84  84.4    MCH 27.6  27.8  27.2    MCHC 32.4  33.1  32.2    RDW 12.9  13.5  13.9    Platelets 289  344  309    Neutrophil Rel % 45.3  56  52.6    Lymphocyte Rel % 41.7  32  36.1    Monocyte Rel % 10.8  10  7.6    Eosinophil Rel % 1.0  1  2.4    Basophil Rel % 1.0  1  0.8      Lipid Panel          4/3/2024    10:33   Lipid Panel   Total Cholesterol 113    Triglycerides 102    HDL Cholesterol 67    VLDL Cholesterol 19    LDL Cholesterol  27      TSH          7/31/2023    14:31 12/8/2023    10:01 4/3/2024    10:33   TSH   TSH 1.550  1.500  1.180      UA          4/3/2024    10:33   Urinalysis   Blood, UA Negative    Leukocytes, UA Negative    Nitrite, UA Negative    RBC, UA 0-2    Bacteria, UA Few                 Assessment and Plan   Diagnoses and all orders for this visit:    1. Healthcare maintenance (Primary)    2. Screening for cardiovascular condition  -     CT Cardiac Calcium Score Without Dye  -     Vascular Screening (Bundle) CAR    3. Menopause  -     DEXA Bone Density Axial    4. IFG (impaired fasting glucose)  -     Hemoglobin A1c    5. Abnormal LFTs  -     Comprehensive Metabolic Panel    6. Breast lesion  -     Ambulatory Referral to Dermatology    Other orders  -     doxepin (SINEquan) 10 MG capsule; Take 1 capsule by mouth  Every Night.  Dispense: 90 capsule; Refill: 3  -     mupirocin (BACTROBAN) 2 % cream; Apply 1 Application topically to the appropriate area as directed 3 (Three) Times a Day.  Dispense: 15 g; Refill: 0    Patient w weight concerns. Discussed at length modifications in nutrtion. She has ifg. She should reduce dietary carbs. Eliminate soda and chips. She should engage in routine activity. She has a new skin lesion right breast. Will start mupirocin. Given h/o breast ca to derm asap. She had sl abnml lft on lab testing. Will repeat cmp. She will get ct cardiac calcium score and vasc screen given fam h/o cad and stroke as well as elevated lipids. She may reduce crestor to 10 mg for now given elevated lft and low ldl c.    F/u here in 4 months or prn.          I spent 55 minutes caring for Meenakshi on this date of service. This time includes time spent by me in the following activities:preparing for the visit, reviewing tests, obtaining and/or reviewing a separately obtained history, performing a medically appropriate examination and/or evaluation , counseling and educating the patient/family/caregiver, ordering medications, tests, or procedures, referring and communicating with other health care professionals , documenting information in the medical record, and independently interpreting results and communicating that information with the patient/family/caregiver  Follow Up   Return in about 3 months (around 7/9/2024) for Recheck.  Patient was given instructions and counseling regarding her condition or for health maintenance advice. Please see specific information pulled into the AVS if appropriate.

## 2024-04-29 ENCOUNTER — OFFICE VISIT (OUTPATIENT)
Dept: ONCOLOGY | Facility: CLINIC | Age: 59
End: 2024-04-29
Payer: MEDICARE

## 2024-04-29 ENCOUNTER — LAB (OUTPATIENT)
Dept: LAB | Facility: HOSPITAL | Age: 59
End: 2024-04-29
Payer: MEDICARE

## 2024-04-29 VITALS
DIASTOLIC BLOOD PRESSURE: 81 MMHG | HEIGHT: 67 IN | HEART RATE: 88 BPM | WEIGHT: 177.1 LBS | TEMPERATURE: 98.2 F | SYSTOLIC BLOOD PRESSURE: 142 MMHG | BODY MASS INDEX: 27.8 KG/M2 | OXYGEN SATURATION: 98 % | RESPIRATION RATE: 18 BRPM

## 2024-04-29 DIAGNOSIS — C50.411 MALIGNANT NEOPLASM OF UPPER-OUTER QUADRANT OF RIGHT BREAST IN FEMALE, ESTROGEN RECEPTOR NEGATIVE: Primary | ICD-10-CM

## 2024-04-29 DIAGNOSIS — Z17.1 MALIGNANT NEOPLASM OF UPPER-OUTER QUADRANT OF RIGHT BREAST IN FEMALE, ESTROGEN RECEPTOR NEGATIVE: ICD-10-CM

## 2024-04-29 DIAGNOSIS — C50.411 MALIGNANT NEOPLASM OF UPPER-OUTER QUADRANT OF RIGHT BREAST IN FEMALE, ESTROGEN RECEPTOR NEGATIVE: ICD-10-CM

## 2024-04-29 DIAGNOSIS — Z17.1 MALIGNANT NEOPLASM OF UPPER-OUTER QUADRANT OF RIGHT BREAST IN FEMALE, ESTROGEN RECEPTOR NEGATIVE: Primary | ICD-10-CM

## 2024-04-29 LAB
BASOPHILS # BLD AUTO: 0.05 10*3/MM3 (ref 0–0.2)
BASOPHILS NFR BLD AUTO: 0.8 % (ref 0–1.5)
DEPRECATED RDW RBC AUTO: 45.1 FL (ref 37–54)
EOSINOPHIL # BLD AUTO: 0.15 10*3/MM3 (ref 0–0.4)
EOSINOPHIL NFR BLD AUTO: 2.5 % (ref 0.3–6.2)
ERYTHROCYTE [DISTWIDTH] IN BLOOD BY AUTOMATED COUNT: 14.6 % (ref 12.3–15.4)
HCT VFR BLD AUTO: 37.5 % (ref 34–46.6)
HGB BLD-MCNC: 12.2 G/DL (ref 12–15.9)
IMM GRANULOCYTES # BLD AUTO: 0.02 10*3/MM3 (ref 0–0.05)
IMM GRANULOCYTES NFR BLD AUTO: 0.3 % (ref 0–0.5)
LYMPHOCYTES # BLD AUTO: 2.46 10*3/MM3 (ref 0.7–3.1)
LYMPHOCYTES NFR BLD AUTO: 40.9 % (ref 19.6–45.3)
MCH RBC QN AUTO: 27.7 PG (ref 26.6–33)
MCHC RBC AUTO-ENTMCNC: 32.5 G/DL (ref 31.5–35.7)
MCV RBC AUTO: 85 FL (ref 79–97)
MONOCYTES # BLD AUTO: 0.54 10*3/MM3 (ref 0.1–0.9)
MONOCYTES NFR BLD AUTO: 9 % (ref 5–12)
NEUTROPHILS NFR BLD AUTO: 2.79 10*3/MM3 (ref 1.7–7)
NEUTROPHILS NFR BLD AUTO: 46.5 % (ref 42.7–76)
NRBC BLD AUTO-RTO: 0 /100 WBC (ref 0–0.2)
PLATELET # BLD AUTO: 283 10*3/MM3 (ref 140–450)
PMV BLD AUTO: 9.2 FL (ref 6–12)
RBC # BLD AUTO: 4.41 10*6/MM3 (ref 3.77–5.28)
WBC NRBC COR # BLD AUTO: 6.01 10*3/MM3 (ref 3.4–10.8)

## 2024-04-29 PROCEDURE — 85025 COMPLETE CBC W/AUTO DIFF WBC: CPT

## 2024-04-29 PROCEDURE — 3077F SYST BP >= 140 MM HG: CPT | Performed by: INTERNAL MEDICINE

## 2024-04-29 PROCEDURE — 36415 COLL VENOUS BLD VENIPUNCTURE: CPT

## 2024-04-29 PROCEDURE — 99214 OFFICE O/P EST MOD 30 MIN: CPT | Performed by: INTERNAL MEDICINE

## 2024-04-29 PROCEDURE — 3079F DIAST BP 80-89 MM HG: CPT | Performed by: INTERNAL MEDICINE

## 2024-04-29 PROCEDURE — 1125F AMNT PAIN NOTED PAIN PRSNT: CPT | Performed by: INTERNAL MEDICINE

## 2024-04-29 NOTE — PROGRESS NOTES
Subjective .     REASONS FOR FOLLOWUP:  Breast cancer    HISTORY OF PRESENT ILLNESS:  The patient is a 58 y.o. year old female  who is here for follow-up with the above-mentioned history.    States she had a lesion removed from the skin of her breast last week.  She is waiting to hear from the dermatologist regarding the pathology.  She does not know of the dermatologist name.  No complaints of SOA, unusual areas of pain, problems eating, SOA          Past Medical History:   Diagnosis Date    Anemia     Arthritis of back 2015    Bulge of lumbar disc without myelopathy 07/10/2017    CTS (carpal tunnel syndrome) 2009    Depression     H/O transfusion of packed red blood cells     Headache     Healthcare maintenance 12/02/2016    History of low back pain     Malignant neoplasm of breast 2013    Right, T2N1M0, Stage IIB    Thoracic disc disorder 2015       HEMATOLOGIC/ONCOLOGIC HISTORY:  (History from previous dates can be found in the separate document.)    MEDICATIONS    Current Outpatient Medications:     albuterol sulfate  (90 Base) MCG/ACT inhaler, Inhale 2 puffs Every 4 (Four) Hours As Needed for Wheezing., Disp: 18 g, Rfl: 3    amLODIPine (NORVASC) 2.5 MG tablet, Take 1 tablet by mouth Daily., Disp: 90 tablet, Rfl: 1    buPROPion XL (WELLBUTRIN XL) 150 MG 24 hr tablet, Take 1 tablet by mouth Daily., Disp: 90 tablet, Rfl: 1    Cholecalciferol (Vitamin D) 50 MCG (2000 UT) capsule, Take 1 capsule by mouth Daily., Disp: 90 capsule, Rfl: 3    clonazePAM (KlonoPIN) 0.5 MG tablet, Take 1 tablet by mouth 2 (Two) Times a Day As Needed for Anxiety., Disp: 60 tablet, Rfl: 1    cyclobenzaprine (FLEXERIL) 10 MG tablet, Take 1 tablet by mouth 2 (Two) Times a Day As Needed for Muscle Spasms., Disp: 30 tablet, Rfl: 4    diclofenac (VOLTAREN) 1 % gel gel, APPLY 4 GRAMS TO AFFECTED AREA(S) FOUR TIMES A DAY, Disp: 1 tube, Rfl: 4    doxepin (SINEquan) 10 MG capsule, Take 1 capsule by mouth Every Night., Disp: 90 capsule,  Rfl: 3    famotidine (PEPCID) 40 MG tablet, Take 1 tablet by mouth every night at bedtime., Disp: 90 tablet, Rfl: 3    fluticasone (FLONASE) 50 MCG/ACT nasal spray, SPRAY 2 SPRAYS INTO THE NOSTRIL DAILY AS DIRECTED BY PROVIDER, Disp: 48 mL, Rfl: 1    methylPREDNISolone (MEDROL) 4 MG dose pack, Take as directed on package instructions., Disp: 21 tablet, Rfl: 0    mupirocin (BACTROBAN) 2 % cream, Apply 1 Application topically to the appropriate area as directed 3 (Three) Times a Day., Disp: 15 g, Rfl: 0    mupirocin (BACTROBAN) 2 % ointment, , Disp: , Rfl:     PARoxetine (PAXIL) 40 MG tablet, Take 1 tablet by mouth Every Morning., Disp: 90 tablet, Rfl: 3    rosuvastatin (CRESTOR) 20 MG tablet, Take 0.5 tablets by mouth Daily., Disp: , Rfl:     vitamin B-12 (CYANOCOBALAMIN) 1000 MCG tablet, Take 1 tablet by mouth Daily., Disp: 90 tablet, Rfl: 3    ALLERGIES:     Allergies   Allergen Reactions    Erythromycin Hives       SOCIAL HISTORY:       Social History     Socioeconomic History    Marital status:     Years of education: College   Tobacco Use    Smoking status: Former     Current packs/day: 0.00     Types: Cigarettes     Start date: 1979     Quit date: 1980     Years since quittin.8    Smokeless tobacco: Former    Tobacco comments:     Smoked from age [age].   Substance and Sexual Activity    Alcohol use: Yes     Alcohol/week: 1.0 standard drink of alcohol     Types: 1 Glasses of wine per week     Comment: Occasional    Drug use: No    Sexual activity: Yes     Partners: Male     Birth control/protection: Condom         FAMILY HISTORY:  Family History   Problem Relation Age of Onset    Breast cancer Mother     Heart disease Mother     Hypertension Mother     Cancer Mother         Breast cancer    Stomach cancer Father     Cancer Father         Systematic    Cancer Brother         Lung and brain cancer    Cancer Brother         Prostate cancer    Ovarian cancer Maternal Aunt     Stomach cancer  "Maternal Aunt        REVIEW OF SYSTEMS:  Review of Systems   Constitutional:  Negative for activity change.   HENT:  Negative for nosebleeds and trouble swallowing.    Respiratory:  Negative for shortness of breath and wheezing.    Cardiovascular:  Negative for chest pain and palpitations.   Gastrointestinal:  Negative for constipation and diarrhea.   Genitourinary:  Negative for dysuria and hematuria.   Musculoskeletal:  Negative for arthralgias and myalgias.   Skin:  Negative for rash and wound.   Neurological:  Negative for seizures and syncope.   Hematological:  Does not bruise/bleed easily.   Psychiatric/Behavioral:  Negative for confusion.           Objective    Vitals:    04/29/24 1123   BP: 142/81   Pulse: 88   Resp: 18   Temp: 98.2 °F (36.8 °C)   TempSrc: Temporal   SpO2: 98%   Weight: 80.3 kg (177 lb 1.6 oz)   Height: 170.2 cm (67.01\")   PainSc: 10-Worst pain ever   PainLoc: Shoulder         4/29/2024    11:17 AM   Current Status   ECOG score 0      PHYSICAL EXAM:          CONSTITUTIONAL:  Vital signs reviewed.  No distress, looks comfortable.  EYES:  Conjunctiva and lids unremarkable.  PERRLA  EARS,NOSE,MOUTH,THROAT:  Ears and nose appear unremarkable.  Lips, teeth, gums appear unremarkable.  RESPIRATORY:  Normal respiratory effort.  Lungs clear to auscultation bilaterally.  CARDIOVASCULAR:  Normal S1, S2.  No murmurs rubs or gallops.  No significant lower extremity edema.  GASTROINTESTINAL: Abdomen appears unremarkable.  Nontender.  No hepatomegaly.  No splenomegaly.  LYMPHATIC:  No cervical, supraclavicular, axillary lymphadenopathy.  SKIN:  Warm.  No rashes.  PSYCHIATRIC:  Normal judgment and insight.  Normal mood and affect.          RECENT LABS:        WBC   Date/Time Value Ref Range Status   04/29/2024 11:05 AM 6.01 3.40 - 10.80 10*3/mm3 Final   04/03/2024 10:33 AM 6.28 3.40 - 10.80 10*3/mm3 Final     Hemoglobin   Date/Time Value Ref Range Status   04/29/2024 11:05 AM 12.2 12.0 - 15.9 g/dL Final "     Platelets   Date/Time Value Ref Range Status   04/29/2024 11:05  140 - 450 10*3/mm3 Final       Assessment/Plan     ASSESSMENT:  *T2N1M0 (stage IIB) invasive ductal carcinoma of the right breast. Esther score 9 of 9 (high grade), triple-negative, 3.3 cm tumor. Right mastectomy and TRAM flap reconstruction. Completed dose-dense Adriamycin/Cytoxan with dose-dense Taxol 07/11/2013. Completed radiation October 2013.   I reminded again today we would be quite unusual for her cancer to recur this late.  There is a good probability she is cured of this cancer.    4/29/2024: She awaits skin biopsy results from the skin lesion at the site of her right mastectomy scar by her dermatologist.  She does not recall the name of the dermatologist.  The biopsy was 1 week ago.  Nothing worrisome for recurrence on exam or by history otherwise    *Previously complained of chest discomfort upon bending over since around December 2018 or so.  I suspect this is related to her weight gain.  No signs of malignancy as the reason for the discomfort.  CT chest 5/14/2019 (done due to chest discomfort with bending over): No evidence of malignancy.    *Nausea.  IBS.  Managed by both Dr. Gallegos and Dr. Vandana Zavaleta.  No changes in chronic nausea    *. Intermittent blurry vision, left eye more so than right eye.  She has seen her ophthalmologist for this.  She states nothing concerning was found.  She follows with Dr. Kiran Cabrera of neurology for this.  A prior MRI read as possible optic neuritis.  She states the follow-up MRI was fine.  She did not complain of this today.  No complaints of this today    *Overweight  Being overweight is a risk factor for breast cancer.  Ideally, she should lose weight.  Body mass index is 27.73 kg/m².  BMI 25 to <30 is overweight  BMI 30 to <35 is class 1 obesity  BMI 35 to <40 is class 2 obesity  BMI 40 or higher is class 3 obesity   Remaining overweight.  Ideally, lose weight.    * 2 liver lesions on  1/31/13 staging CAT scan.  Liver protocol CT subsequently read as benign liver lesions.  Liver lesions unchanged on CT 5/4/16.    * She has an annual  for breast cancer.      * Chronic sciatica and cervical pain.  Treatment through her PCP, Dr. Vandana Zavaleta.  As had epidurals.    *Previously complained of left preauricular minimally enlarged node.  Was following with Dr. Tin Ordoñez of ENT.  The node did not feel malignant my exam.  Dr. Ordoñez, last record I have for review is 4/6/18.    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  4/ 29/24: States bilateral fingertip and toe numbness and tingling.  Intermittent.  She did have some pain in her right first toe but after seeing a podiatrist this was determined to be due to arthritis which is significantly improved after compounded cream he prescribed for her.    *Her mother passed away September 2021.    She is dealing with this loss.    Plan  MD GARCIA 1 year (because she is over 5 years out from triple negative breast cancer, this is likely cured)  Her port has been removed.   Last mammogram, 7/13/2023: BI-RADS 0 need additional managing.  Additional imaging 8/3/2023: BI-RADS 4A, low suspicion for malignancy.  Subsequent biopsy 8/30/2023: Pathology benign with recommendation to return to annual screening mammogram July 2024  Patient had a skin biopsy at her right mastectomy incision site by dermatologist sometime around 4/21/2024.  She does not recall the dermatologist name.    This visit I also reviewed the CMP, TSH, CBC from 4/3/2024 which were not ordered by me.      Please call her and tell her if she would like we could try medication for neuropathy.  The best proven medication for neuropathy is Cymbalta.  However, considering her other medications, if she would like to try Cymbalta I think she should see [facility] behavioral oncology so Nalini could manage those medicines.    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

## 2024-06-25 DIAGNOSIS — R79.89 ABNORMAL CBC: ICD-10-CM

## 2024-06-25 DIAGNOSIS — R31.9 HEMATURIA, UNSPECIFIED TYPE: Primary | ICD-10-CM

## 2024-06-25 DIAGNOSIS — R73.09 ELEVATED GLUCOSE: ICD-10-CM

## 2024-06-25 DIAGNOSIS — Z79.899 HIGH RISK MEDICATION USE: ICD-10-CM

## 2024-07-03 LAB
BASOPHILS # BLD AUTO: 0.05 10*3/MM3 (ref 0–0.2)
BASOPHILS NFR BLD AUTO: 0.9 % (ref 0–1.5)
EOSINOPHIL # BLD AUTO: 0.13 10*3/MM3 (ref 0–0.4)
EOSINOPHIL NFR BLD AUTO: 2.3 % (ref 0.3–6.2)
ERYTHROCYTE [DISTWIDTH] IN BLOOD BY AUTOMATED COUNT: 13.5 % (ref 12.3–15.4)
HBA1C MFR BLD: 5.9 % (ref 4.8–5.6)
HCT VFR BLD AUTO: 37.9 % (ref 34–46.6)
HGB BLD-MCNC: 12.3 G/DL (ref 12–15.9)
IMM GRANULOCYTES # BLD AUTO: 0.02 10*3/MM3 (ref 0–0.05)
IMM GRANULOCYTES NFR BLD AUTO: 0.4 % (ref 0–0.5)
LYMPHOCYTES # BLD AUTO: 2.04 10*3/MM3 (ref 0.7–3.1)
LYMPHOCYTES NFR BLD AUTO: 36.8 % (ref 19.6–45.3)
MCH RBC QN AUTO: 27.4 PG (ref 26.6–33)
MCHC RBC AUTO-ENTMCNC: 32.5 G/DL (ref 31.5–35.7)
MCV RBC AUTO: 84.4 FL (ref 79–97)
MONOCYTES # BLD AUTO: 0.55 10*3/MM3 (ref 0.1–0.9)
MONOCYTES NFR BLD AUTO: 9.9 % (ref 5–12)
NEUTROPHILS # BLD AUTO: 2.75 10*3/MM3 (ref 1.7–7)
NEUTROPHILS NFR BLD AUTO: 49.7 % (ref 42.7–76)
NRBC BLD AUTO-RTO: 0 /100 WBC (ref 0–0.2)
PLATELET # BLD AUTO: 300 10*3/MM3 (ref 140–450)
RBC # BLD AUTO: 4.49 10*6/MM3 (ref 3.77–5.28)
WBC # BLD AUTO: 5.54 10*3/MM3 (ref 3.4–10.8)

## 2024-07-05 LAB
ACCEPTABLE CREAT UR QL: 169.6 MG/DL (ref 20–300)
AMPHETAMINES UR QL SCN: NEGATIVE NG/ML
BARBITURATES UR QL SCN: NEGATIVE NG/ML
BENZODIAZ UR QL SCN: NEGATIVE NG/ML
COCAINE UR QL SCN: NEGATIVE NG/ML
METHADONE UR QL SCN: NEGATIVE NG/ML
OPIATES UR QL SCN: NEGATIVE NG/ML
OXYCODONE+OXYMORPHONE UR QL SCN: NEGATIVE NG/ML
PCP UR QL SCN: NEGATIVE NG/ML
PH UR: 5.5 [PH] (ref 4.5–8.9)
PROPOXYPH UR QL SCN: NEGATIVE NG/ML

## 2024-07-09 ENCOUNTER — OFFICE VISIT (OUTPATIENT)
Dept: INTERNAL MEDICINE | Facility: CLINIC | Age: 59
End: 2024-07-09
Payer: MEDICARE

## 2024-07-09 VITALS
SYSTOLIC BLOOD PRESSURE: 140 MMHG | HEART RATE: 104 BPM | HEIGHT: 67 IN | DIASTOLIC BLOOD PRESSURE: 72 MMHG | WEIGHT: 172 LBS | OXYGEN SATURATION: 97 % | BODY MASS INDEX: 27 KG/M2

## 2024-07-09 DIAGNOSIS — J45.30 MILD PERSISTENT ASTHMA WITHOUT COMPLICATION: ICD-10-CM

## 2024-07-09 DIAGNOSIS — F32.A DEPRESSION, UNSPECIFIED DEPRESSION TYPE: ICD-10-CM

## 2024-07-09 DIAGNOSIS — R53.83 OTHER FATIGUE: ICD-10-CM

## 2024-07-09 DIAGNOSIS — I10 ESSENTIAL HYPERTENSION: ICD-10-CM

## 2024-07-09 DIAGNOSIS — E55.9 VITAMIN D DEFICIENCY: ICD-10-CM

## 2024-07-09 DIAGNOSIS — E78.5 HYPERLIPIDEMIA, UNSPECIFIED HYPERLIPIDEMIA TYPE: ICD-10-CM

## 2024-07-09 DIAGNOSIS — F41.9 ANXIETY: Primary | ICD-10-CM

## 2024-07-09 DIAGNOSIS — G47.00 INSOMNIA, UNSPECIFIED TYPE: ICD-10-CM

## 2024-07-09 LAB
25(OH)D3+25(OH)D2 SERPL-MCNC: 23.8 NG/ML (ref 30–100)
ALBUMIN SERPL-MCNC: 4.9 G/DL (ref 3.5–5.2)
ALBUMIN SERPL-MCNC: 4.9 G/DL (ref 3.5–5.2)
ALBUMIN/GLOB SERPL: 2.1 G/DL
ALBUMIN/GLOB SERPL: 2.2 G/DL
ALP SERPL-CCNC: 147 U/L (ref 39–117)
ALP SERPL-CCNC: 148 U/L (ref 39–117)
ALT SERPL-CCNC: 36 U/L (ref 1–33)
ALT SERPL-CCNC: 37 U/L (ref 1–33)
AST SERPL-CCNC: 25 U/L (ref 1–32)
AST SERPL-CCNC: 26 U/L (ref 1–32)
BILIRUB SERPL-MCNC: 0.4 MG/DL (ref 0–1.2)
BILIRUB SERPL-MCNC: 0.5 MG/DL (ref 0–1.2)
BUN SERPL-MCNC: 6 MG/DL (ref 8–23)
BUN SERPL-MCNC: 6 MG/DL (ref 8–23)
BUN/CREAT SERPL: 7.1 (ref 7–25)
BUN/CREAT SERPL: 7.2 (ref 7–25)
CALCIUM SERPL-MCNC: 9.8 MG/DL (ref 8.6–10.5)
CALCIUM SERPL-MCNC: 9.8 MG/DL (ref 8.6–10.5)
CHLORIDE SERPL-SCNC: 104 MMOL/L (ref 98–107)
CHLORIDE SERPL-SCNC: 104 MMOL/L (ref 98–107)
CO2 SERPL-SCNC: 25.4 MMOL/L (ref 22–29)
CO2 SERPL-SCNC: 25.5 MMOL/L (ref 22–29)
CREAT SERPL-MCNC: 0.83 MG/DL (ref 0.57–1)
CREAT SERPL-MCNC: 0.85 MG/DL (ref 0.57–1)
EGFRCR SERPLBLD CKD-EPI 2021: 78.1 ML/MIN/1.73
EGFRCR SERPLBLD CKD-EPI 2021: 80.3 ML/MIN/1.73
FOLATE SERPL-MCNC: 7.65 NG/ML (ref 4.78–24.2)
GLOBULIN SER CALC-MCNC: 2.2 GM/DL
GLOBULIN SER CALC-MCNC: 2.3 GM/DL
GLUCOSE SERPL-MCNC: 69 MG/DL (ref 65–99)
GLUCOSE SERPL-MCNC: 70 MG/DL (ref 65–99)
HBA1C MFR BLD: 6 % (ref 4.8–5.6)
POTASSIUM SERPL-SCNC: 4.4 MMOL/L (ref 3.5–5.2)
POTASSIUM SERPL-SCNC: 4.6 MMOL/L (ref 3.5–5.2)
PROT SERPL-MCNC: 7.1 G/DL (ref 6–8.5)
PROT SERPL-MCNC: 7.2 G/DL (ref 6–8.5)
SODIUM SERPL-SCNC: 141 MMOL/L (ref 136–145)
SODIUM SERPL-SCNC: 142 MMOL/L (ref 136–145)
TSH SERPL DL<=0.005 MIU/L-ACNC: 1.48 UIU/ML (ref 0.27–4.2)
VIT B12 SERPL-MCNC: 544 PG/ML (ref 211–946)

## 2024-07-09 PROCEDURE — 3077F SYST BP >= 140 MM HG: CPT | Performed by: INTERNAL MEDICINE

## 2024-07-09 PROCEDURE — 1160F RVW MEDS BY RX/DR IN RCRD: CPT | Performed by: INTERNAL MEDICINE

## 2024-07-09 PROCEDURE — 3078F DIAST BP <80 MM HG: CPT | Performed by: INTERNAL MEDICINE

## 2024-07-09 PROCEDURE — 1159F MED LIST DOCD IN RCRD: CPT | Performed by: INTERNAL MEDICINE

## 2024-07-09 PROCEDURE — 1125F AMNT PAIN NOTED PAIN PRSNT: CPT | Performed by: INTERNAL MEDICINE

## 2024-07-09 PROCEDURE — 99214 OFFICE O/P EST MOD 30 MIN: CPT | Performed by: INTERNAL MEDICINE

## 2024-07-09 RX ORDER — MONTELUKAST SODIUM 10 MG/1
10 TABLET ORAL NIGHTLY
Qty: 90 TABLET | Refills: 3 | Status: SHIPPED | OUTPATIENT
Start: 2024-07-09

## 2024-07-09 NOTE — PROGRESS NOTES
Chief Complaint   Patient presents with    Hypertension    Anxiety    Depression    Insomnia       Hypertension  Associated symptoms include anxiety, chest pain and shortness of breath. Pertinent negatives include no palpitations.   Anxiety   Symptoms include chest pain, confusion, insomnia, nausea and shortness of breath.  Patient reports no dizziness or palpitations. Her past medical history is significant for depression.   DepressionSymptoms include confusion, insomnia, shortness of breath, chest pain and nausea. Patient is not experiencing: palpitations and dizziness.  Her past medical history is significant for depression.   Insomnia  Associated symptoms include chest pain and nausea.      Meenakshi Mae is a 59 y.o. female presents for follow up evaluation. Patient has htn, anxiety, hyperlipidemia, insomnia. Patient reports some challenges with insomnia. She notes that she is still not getting full rest with doxepin and has tried several others. She falls asleep then wakes after 4 hours. She has heavy snoring and some gasping for air in her sleep.     The following portions of the patient's history were reviewed and updated as appropriate: allergies, current medications, past family history, past medical history, past social history, past surgical history and problem list.  Current Outpatient Medications on File Prior to Visit   Medication Sig Dispense Refill    albuterol sulfate  (90 Base) MCG/ACT inhaler Inhale 2 puffs Every 4 (Four) Hours As Needed for Wheezing. 18 g 3    amLODIPine (NORVASC) 2.5 MG tablet Take 1 tablet by mouth Daily. 90 tablet 1    buPROPion XL (WELLBUTRIN XL) 150 MG 24 hr tablet Take 1 tablet by mouth Daily. 90 tablet 1    Cholecalciferol (Vitamin D) 50 MCG (2000 UT) capsule Take 1 capsule by mouth Daily. 90 capsule 3    clonazePAM (KlonoPIN) 0.5 MG tablet Take 1 tablet by mouth 2 (Two) Times a Day As Needed for Anxiety. 60 tablet 1    cyclobenzaprine (FLEXERIL) 10 MG tablet  Take 1 tablet by mouth 2 (Two) Times a Day As Needed for Muscle Spasms. 30 tablet 4    diclofenac (VOLTAREN) 1 % gel gel APPLY 4 GRAMS TO AFFECTED AREA(S) FOUR TIMES A DAY 1 tube 4    doxepin (SINEquan) 10 MG capsule Take 1 capsule by mouth Every Night. 90 capsule 3    fluticasone (FLONASE) 50 MCG/ACT nasal spray SPRAY 2 SPRAYS INTO THE NOSTRIL DAILY AS DIRECTED BY PROVIDER 48 mL 1    methylPREDNISolone (MEDROL) 4 MG dose pack Take as directed on package instructions. 21 tablet 0    PARoxetine (PAXIL) 40 MG tablet Take 1 tablet by mouth Every Morning. 90 tablet 3    rosuvastatin (CRESTOR) 20 MG tablet Take 0.5 tablets by mouth Daily.      vitamin B-12 (CYANOCOBALAMIN) 1000 MCG tablet Take 1 tablet by mouth Daily. 90 tablet 3    famotidine (PEPCID) 40 MG tablet Take 1 tablet by mouth every night at bedtime. (Patient not taking: Reported on 7/9/2024) 90 tablet 3    [DISCONTINUED] mupirocin (BACTROBAN) 2 % cream Apply 1 Application topically to the appropriate area as directed 3 (Three) Times a Day. (Patient not taking: Reported on 7/9/2024) 15 g 0    [DISCONTINUED] mupirocin (BACTROBAN) 2 % ointment  (Patient not taking: Reported on 7/9/2024)       No current facility-administered medications on file prior to visit.     Review of Systems   Constitutional: Negative.    HENT: Negative.     Eyes: Negative.    Respiratory:  Positive for shortness of breath.    Cardiovascular:  Positive for chest pain. Negative for palpitations.   Gastrointestinal:  Positive for nausea.   Endocrine: Negative.    Genitourinary: Negative.    Musculoskeletal: Negative.    Skin: Negative.    Allergic/Immunologic: Negative.    Neurological: Negative.  Negative for dizziness.   Psychiatric/Behavioral:  Positive for confusion. The patient has insomnia.        Objective   Physical Exam  Vitals and nursing note reviewed.   Constitutional:       Appearance: Normal appearance. She is well-developed.   HENT:      Head: Normocephalic and atraumatic.     "  Right Ear: Tympanic membrane and external ear normal.      Left Ear: Tympanic membrane and external ear normal.      Nose: Nose normal.      Mouth/Throat:      Mouth: Mucous membranes are moist.   Eyes:      Extraocular Movements: Extraocular movements intact.      Pupils: Pupils are equal, round, and reactive to light.   Cardiovascular:      Rate and Rhythm: Normal rate and regular rhythm.      Pulses: Normal pulses.      Heart sounds: Normal heart sounds.   Pulmonary:      Effort: Pulmonary effort is normal. No respiratory distress.      Breath sounds: Normal breath sounds.   Abdominal:      General: Abdomen is flat.      Palpations: Abdomen is soft.   Musculoskeletal:         General: Normal range of motion.      Cervical back: Normal range of motion and neck supple.   Skin:     General: Skin is warm and dry.   Neurological:      General: No focal deficit present.      Mental Status: She is alert and oriented to person, place, and time.   Psychiatric:         Mood and Affect: Mood normal.         Behavior: Behavior normal.         Thought Content: Thought content normal.         Judgment: Judgment normal.          /72   Pulse 104   Ht 170.2 cm (67.01\")   Wt 78 kg (172 lb)   LMP  (LMP Unknown)   SpO2 97%   BMI 26.93 kg/m²     Assessment & Plan   Diagnoses and all orders for this visit:    Anxiety  -     [facility] Ref[facility]  [facility]  -     TSH  -     Vitamin D,25-Hydroxy  -     Vitamin B12  -     Folate  -     Comprehensive Metabolic Panel    Depression, unspecified depression type  -     Ambulatory Ref[facility]  [facility]  -     TSH  -     Vitamin D,25-Hydroxy  -     Vitamin B12  -     Folate  -     Comprehensive Metabolic Panel    Insomnia, unspecified type  -     Ambulatory Referral to Sleep Medicine    Other fatigue  -     Ambulatory Referral to Sleep Medicine  -     TSH  -     Vitamin D,25-Hydroxy  -     Vitamin B12  -     " Folate  -     Comprehensive Metabolic Panel    Essential hypertension  -     TSH  -     Vitamin D,25-Hydroxy  -     Vitamin B12  -     Folate  -     Comprehensive Metabolic Panel    Hyperlipidemia, unspecified hyperlipidemia type  -     TSH  -     Vitamin D,25-Hydroxy  -     Vitamin B12  -     Folate  -     Comprehensive Metabolic Panel    Vitamin D deficiency  -     Vitamin D,25-Hydroxy      Patient w anxiety and depression. She is to have [facility] testing. Referred to psychologist. She is to engage in healthy and positive activities. She has fatigue. She is advised to meet w sleep medicine. She has impaired fasting glucose. To continue low carb and routien fitness. She will have listed labs tested. Encouraged daily vit d given h/o deficiency. [facility] testing information given. Contracted for safety. Suicide hotline information given. Reports no SI at this time.                Answers submitted by the patient for this visit:  Primary Reason for Visit (Submitted on 7/6/2024)  What is the primary reason for your visit?: Anxiety

## 2024-07-11 ENCOUNTER — TELEMEDICINE (OUTPATIENT)
Dept: PSYCHIATRY | Facility: CLINIC | Age: 59
End: 2024-07-11
Payer: MEDICARE

## 2024-07-11 DIAGNOSIS — F41.1 GENERALIZED ANXIETY DISORDER: ICD-10-CM

## 2024-07-11 DIAGNOSIS — F33.2 SEVERE EPISODE OF RECURRENT MAJOR DEPRESSIVE DISORDER, WITHOUT PSYCHOTIC FEATURES: ICD-10-CM

## 2024-07-11 DIAGNOSIS — F43.81 PROLONGED GRIEF DISORDER: Primary | ICD-10-CM
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
## 2024-07-12 DIAGNOSIS — R79.89 ABNORMAL LFTS: Primary | ICD-10-CM

## 2024-07-12 RX ORDER — ROSUVASTATIN CALCIUM 5 MG/1
5 TABLET, COATED ORAL DAILY
Qty: 90 TABLET | Refills: 0 | Status: SHIPPED | OUTPATIENT
Start: 2024-07-12

## 2024-07-12 RX ORDER — MULTIVIT-MIN/IRON/FOLIC ACID/K 18-600-40
2000 CAPSULE ORAL DAILY
Qty: 90 CAPSULE | Refills: 3 | Status: SHIPPED | OUTPATIENT
Start: 2024-07-12

## 2024-07-25 ENCOUNTER — TELEMEDICINE (OUTPATIENT)
Dept: PSYCHIATRY | Facility: CLINIC | Age: 59
End: 2024-07-25
Payer: MEDICARE

## 2024-07-25 DIAGNOSIS — F43.81 PROLONGED GRIEF DISORDER: ICD-10-CM

## 2024-07-25 DIAGNOSIS — F43.10 POST TRAUMATIC STRESS DISORDER (PTSD): ICD-10-CM

## 2024-07-25 DIAGNOSIS — F41.1 GENERALIZED ANXIETY DISORDER: ICD-10-CM

## 2024-07-25 DIAGNOSIS — F33.2 SEVERE EPISODE OF RECURRENT MAJOR DEPRESSIVE DISORDER, WITHOUT PSYCHOTIC FEATURES: Primary | ICD-10-CM

## 2024-07-25 RX ORDER — BUPROPION HYDROCHLORIDE 200 MG/1
200 TABLET, EXTENDED RELEASE ORAL EVERY MORNING
Qty: 90 TABLET | Refills: 0 | Status: SHIPPED | OUTPATIENT
Start: 2024-07-25
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
## 2024-07-25 NOTE — PATIENT INSTRUCTIONS
For concerns or needing assistance call the [facility] at [phone]  Medication refills:- Refill requests are addressed M-Th. No refills will be sent in on Fridays, weekends or federal holidays.   Please be aware of your need for refills and call one week before needing medication refilled so it can be filled in a timely manner.   INCREASE Bupropion (Wellbutrin) to 200mg SR every morning.  Continue doxepin for insomnia  MOVE dosage of Paroxetine to nighttime over the next couple of days- take at noon on Friday, and take around 6 PM on Saturday, then move to bedtime on Sunday.

## 2024-07-31 ENCOUNTER — TELEMEDICINE (OUTPATIENT)
Dept: PSYCHIATRY | Facility: CLINIC | Age: 59
End: 2024-07-31
Payer: MEDICARE

## 2024-07-31 DIAGNOSIS — F33.2 SEVERE EPISODE OF RECURRENT MAJOR DEPRESSIVE DISORDER, WITHOUT PSYCHOTIC FEATURES: ICD-10-CM

## 2024-07-31 DIAGNOSIS — F43.81 PROLONGED GRIEF DISORDER: Primary | ICD-10-CM

## 2024-07-31 DIAGNOSIS — F41.1 GENERALIZED ANXIETY DISORDER: ICD-10-CM

## 2024-07-31 NOTE — PROGRESS NOTES
"[facility]   Follow-up Progress Note     Date: July 31, 2024  Time In: 3:36  Time Out: 4:21      PROGRESS NOTE  Data:  Meenakshi Mae is a 59 y.o. female presenting to [facility] (through [facility]), [address] [place], [address] using a secure [employer] Video Visit through [facility] for assessment with Solo Leblanc LCSW. The patient is seen remotely in their  home  using [facility] My Chart. Patient is being seen via telehealth and stated they are in a secure environment for this session. The patient's condition being diagnosed/treated is appropriate for telemedicine. The provider identified herself as well as her credentials. The patient and/or patients guardian consent to be seen remotely, and when consent is given they understand that the consent allows for patient identifiable information to be sent to a third party as needed. They may refuse to be seen remotely at any time. The electronic data is encrypted and password protected, and the patient has been advised of the potential risks to privacy not withstanding such measures.    Today Patient expressed she has been doing well until yesterday evening and this morning. Patient stated she learned her daughter is traveling to [facility] to visit her 's family. Patient stated her daughter requested if patient would come watch their pets. Patient stated it was upsetting the treatment from her daughter. Patient stated she also had a negative interaction with her soon to be ex-. Patient stated she would like to be able to accept that not helping those around her at the expense of her own needs. Patient stated she would like to get back to her \"bright self.\" Patient stated she would like to be more \"carefree\" and less concerned with others' problems. Patient stated she would like to be able to grieve her mother's death. Patient stated medication " "provider changed some of her medications that were causing night time \"snacking.\" Patient stated it has decreased this behavior significantly. Discussed with patient utilizing journaling to process her thoughts and emotions. Patient stated she did journal in the past but reading over her thoughts increased depression. Provider encouraged patient from reading over triggering emotions could cause her to re-experience those emotions. Provider discouraged patient from reading her journal but rather utilizing it to release.       Clinical Maneuvering/Intervention:    Chief Complaint: grief, anxiety, depression     (Scales based on 0 - 10 with 10 being the worst)  Depression: 8 Anxiety: 8     Assisted Patient in processing above session content; acknowledged and normalized patient’s thoughts, feelings, and concerns.  Rationalized patient thought process regarding identifying treatment goals.  Discussed triggers associated with patient's depression.  Also discussed coping skills for patient to implement such as journaling.    Allowed Patient to freely discuss issues  without interruption or judgement with unconditional positive regard, active listening skills, and empathy. Therapist provided a safe, confidential environment to facilitate the development of a positive therapeutic relationship and encouraged open, honest communication. Assisted Patient in identifying risk factors which would indicate the need for higher level of care including thoughts to harm self or others and/or self-harming behavior and encouraged Patient to contact this office, call 911, or present to the nearest emergency room should any of these events occur. Discussed crisis intervention services and means to access. Patient adamantly and convincingly denies current suicidal or homicidal ideation or perceptual disturbance. Assisted Patient in processing session content; acknowledged and normalized Patient’s thoughts, feelings, and concerns by " utilizing a person-centered approach in efforts to build appropriate rapport and a positive therapeutic relationship with open and honest communication. Therapist utilized dialectical behavior techniques to teach and model emotional regulation and relaxation methods. Therapist assisted Patient with identifying and implementing healthier coping strategies.     Assessment   Patient appears to be experiencing heightened anxiety and depression in response to COVID-19 epidemic  As a result, they can be reasonably expected to continue to benefit from treatment and would likely be at increased risk for decompensation otherwise.    Mental Status Exam:   Hygiene:   good  Cooperation:  Cooperative  Eye Contact:  Good  Psychomotor Behavior:  Appropriate  Affect:  Full range  Mood:  gloomy, tired  Speech:  Normal  Thought Process:  Goal directed  Thought Content:  Mood congruent  Suicidal:  None  Homicidal:  None  Hallucinations:  None  Delusion:  None  Memory:  Intact  Orientation:  Person, Place, Time, and Situation  Reliability:  good  Insight:  Good  Judgement:  Good  Impulse Control:  Good  Physical/Medical Issues:  No      PHQ-Score Total:  PHQ-9 Total Score:        Patient's Support Network Includes:  extended family and friend    Functional Status: Moderate impairment     Progress toward goal: Not at goal    Prognosis: Good with Ongoing Treatment            Impression/Formulation:    VISIT DIAGNOSIS:     ICD-10-CM ICD-9-CM   1. Prolonged grief disorder  F43.81 309.1   2. Severe episode of recurrent major depressive disorder, without psychotic features  F33.2 296.33   3. Generalized anxiety disorder  F41.1 300.02        Patient appeared alert and oriented.  Patient is voluntarily requesting to [facility].  Patient is receptive to assistance with maintaining a stable lifestyle.  Patient presents with history of anxiety, depression, and grief.  Patient is  agreeable to attend routine therapy sessions.  Patient expressed desire to maintain stability and participate in the therapeutic process.        Crisis Plan:  Symptoms and/or behaviors to indicate a crisis: Prolonged irritability or anger    What calming techniques or other strategies will patient use to de-esclate and stay safe: slow down, breathe, visualize calming self, think it though, listen to music, change focus, take a walk    Who is one person patient can contact to assist with de-escalation?      If symptoms/behaviors persist, Patient will present to the nearest hospital for an assessment. Advised patient of [facility] ER and assessment services.     Plan:   Patient will continue in individual outpatient therapy with focus on improved functioning and coping skills, maintaining stability, and avoiding decompensation and the need for higher level of care.    Patient will contact this office ([facility] at [phone]), call 911 or present to the nearest emergency room should suicidal or homicidal ideations occur. Provide Cognitive Behavioral Therapy and Solution Focused Therapy to improve functioning, maintain stability, and avoid decompensation and the need for higher level of care.     Return in about 3 weeks, or earlier if symptoms worsen or fail to improve.    Recommended Referrals: none        This document has been electronically signed by Solo Leblanc LCSW  July 31, 2024 15:35 EDT        Part of this note may be an electronic transcription/translation of spoken language to printed text using the Dragon Dictation System.

## 2024-07-31 NOTE — TREATMENT PLAN
Multi-Disciplinary Problems (from Behavioral Health Treatment Plan)      Active Problems       Problem: Anxiety  Start Date: 07/31/24      Problem Details: The patient self-scales this problem as a 8 with 10 being the worst.          Goal Priority Start Date Expected End Date End Date    Patient will develop and implement behavioral and cognitive strategies to reduce anxiety and irrational fears. -- 07/31/24 01/29/25 --    Goal Details: Progress toward goal:  Not appropriate to rate progress toward goal since this is the initial treatment plan.          Goal Intervention Frequency Start Date End Date    Help patient explore past emotional issues in relation to present anxiety. Q3 Weeks 07/31/24 --    Intervention Details: Duration of treatment until discharged.          Goal Intervention Frequency Start Date End Date    Help patient develop an awareness of their cognitive and physical responses to anxiety. Q3 Weeks 07/31/24 --    Intervention Details: Duration of treatment until discharged.                  Problem: Depression  Start Date: 07/31/24      Problem Details: The patient self-scales this problem as a 10 with 10 being the worst.          Goal Priority Start Date Expected End Date End Date    Patient will demonstrate the ability to initiate new constructive life skills outside of sessions on a consistent basis. -- 07/31/24 01/29/25 --    Goal Details: Progress toward goal:  Not appropriate to rate progress toward goal since this is the initial treatment plan.          Goal Intervention Frequency Start Date End Date    Assist patient in setting attainable activities of daily living goals. PRN 07/31/24 --      Goal Intervention Frequency Start Date End Date    Provide education about depression Q3 Weeks 07/31/24 --    Intervention Details: Duration of treatment until discharged.          Goal Intervention Frequency Start Date End Date    Assist patient in developing healthy coping strategies. Q3 Weeks  07/31/24 --    Intervention Details: Duration of treatment until discharged.                  Problem: Grief and Loss  Start Date: 07/31/24      Problem Details: The patient self-scales this problem as a 10 with 10 being the worst.          Goal Priority Start Date Expected End Date End Date    Patient will process feelings and identify and implement specific ways to facilitate the grieving process. -- 07/31/24 01/29/25 --    Goal Details: Progress toward goal:  Not appropriate to rate progress toward goal since this is the initial treatment plan.          Goal Intervention Frequency Start Date End Date    Assist patient in identifying and processing feelings about losses. Q3 Weeks 07/31/24 --    Intervention Details: Duration of treatment until discharged.          Goal Intervention Frequency Start Date End Date    Identify ways to grieve effectively and utilize healthy support systems Q3 Weeks 07/31/24 --    Intervention Details: Duration of treatment until discharged.                          Reviewed By       Solo Leblanc, [facility] 07[facility]31/24 [id number]                     I have discussed and reviewed this treatment plan with the patient.

## 2024-08-09 RX ORDER — ROSUVASTATIN CALCIUM 20 MG/1
20 TABLET, COATED ORAL DAILY
Qty: 90 TABLET | Refills: 1 | OUTPATIENT
Start: 2024-08-09

## 2024-08-09 RX ORDER — BUPROPION HYDROCHLORIDE 150 MG/1
150 TABLET ORAL DAILY
Qty: 90 TABLET | Refills: 1 | OUTPATIENT
Start: 2024-08-09

## 2024-08-19 ENCOUNTER — OFFICE VISIT (OUTPATIENT)
Dept: SLEEP MEDICINE | Facility: HOSPITAL | Age: 59
End: 2024-08-19
Payer: MEDICARE

## 2024-08-19 VITALS
HEIGHT: 68 IN | SYSTOLIC BLOOD PRESSURE: 124 MMHG | HEART RATE: 100 BPM | WEIGHT: 169 LBS | OXYGEN SATURATION: 97 % | BODY MASS INDEX: 25.61 KG/M2 | DIASTOLIC BLOOD PRESSURE: 77 MMHG

## 2024-08-19 DIAGNOSIS — R53.83 OTHER FATIGUE: ICD-10-CM

## 2024-08-19 DIAGNOSIS — G47.10 HYPERSOMNIA: Primary | ICD-10-CM

## 2024-08-19 DIAGNOSIS — R06.83 LOUD SNORING: ICD-10-CM

## 2024-08-19 DIAGNOSIS — G47.00 INSOMNIA, UNSPECIFIED TYPE: ICD-10-CM

## 2024-08-19 PROCEDURE — G0463 HOSPITAL OUTPT CLINIC VISIT: HCPCS

## 2024-08-19 NOTE — PROGRESS NOTES
[facility]  Telephone: [phone] / Fax: [phone] [place]  Telephone: [phone] / Fax: [phone] Hali Grajeda    PCP: Vandana Zavaleta MD    Reason for visit: hypersomnia, sleep disturbances f/u    Meenakshi Mae is a 59 y.o.female  was last seen at [facility] sleep lab in November 2022. We ordered PSG in the lab to assess hypersomnia nd evaluate sleep disorder breathing. At the time insurance denied coverage for in lab study.  Order was changed to a home sleep study. However, it has not yet been performed.    She has Clonazepam prescribed BID for anxiety, but usually takes it qam. She started Doxepin 10mg for insomnia, which helps a lot. She previously took Trazodone, but it stopped working. Lunesta also tried but did not work. She was sleep eating while taking it. Her anxiety/depression are controlled for the most part on Wellbutrin. She is also seeing a psychiatrist.    She continues to snore at night. It does wake her up at night. She also wakes up coughing . She wakes up feeling tired in the morning and wishes to go back to sleep.  Her breast cancer remains in remission.    SH-no tobacco, no alcohol, 2-3 caffeine per day.    ROS- +wheezing, +cough, +depression, rest is negative.      Current Medications:    Current Outpatient Medications:     albuterol sulfate  (90 Base) MCG/ACT inhaler, Inhale 2 puffs Every 4 (Four) Hours As Needed for Wheezing., Disp: 18 g, Rfl: 3    amLODIPine (NORVASC) 2.5 MG tablet, Take 1 tablet by mouth Daily., Disp: 90 tablet, Rfl: 1    buPROPion SR (Wellbutrin SR) 200 MG 12 hr tablet, Take 1 tablet by mouth Every Morning., Disp: 90 tablet, Rfl: 0    Cholecalciferol (Vitamin D) 50 MCG (2000 UT) capsule, Take 1 capsule by mouth Daily., Disp: 90 capsule, Rfl: 3    clonazePAM (KlonoPIN) 0.5 MG tablet, Take 1 tablet by mouth 2 (Two) Times a Day As Needed for Anxiety., Disp: 60 tablet, Rfl: 1    cyclobenzaprine (FLEXERIL) 10 MG tablet, Take 1 tablet by  "mouth 2 (Two) Times a Day As Needed for Muscle Spasms., Disp: 30 tablet, Rfl: 4    diclofenac (VOLTAREN) 1 % gel gel, APPLY 4 GRAMS TO AFFECTED AREA(S) FOUR TIMES A DAY, Disp: 1 tube, Rfl: 4    doxepin (SINEquan) 10 MG capsule, Take 1 capsule by mouth Every Night., Disp: 90 capsule, Rfl: 3    fluticasone (FLONASE) 50 MCG/ACT nasal spray, SPRAY 2 SPRAYS INTO THE NOSTRIL DAILY AS DIRECTED BY PROVIDER, Disp: 48 mL, Rfl: 1    methylPREDNISolone (MEDROL) 4 MG dose pack, Take as directed on package instructions., Disp: 21 tablet, Rfl: 0    PARoxetine (PAXIL) 40 MG tablet, Take 1 tablet by mouth Every Morning., Disp: 90 tablet, Rfl: 3    rosuvastatin (Crestor) 5 MG tablet, Take 1 tablet by mouth Daily., Disp: 90 tablet, Rfl: 0    vitamin B-12 (CYANOCOBALAMIN) 1000 MCG tablet, Take 1 tablet by mouth Daily., Disp: 90 tablet, Rfl: 3   also entered in Sleep Questionnaire    Patient  has a past medical history of Anemia, Anxiety, Arthritis of back (2015), Bulge of lumbar disc without myelopathy (07/10/2017), CTS (carpal tunnel syndrome) (2009), Depression, H/O transfusion of packed red blood cells, Head injury, Headache, 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    I have reviewed the Past Medical History, Past Surgical History, Social History and Family History.    Vital Signs /77   Pulse 100   Ht 172.7 cm (67.99\")   Wt 76.7 kg (169 lb)   LMP  (LMP Unknown)   SpO2 97%   BMI 25.70 kg/m²  Body mass index is 25.7 kg/m².    General Alert and oriented. No acute distress noted   Pharynx/Throat Class IV  Mallampati airway, large tongue, no evidence of redundant lateral pharyngeal tissue. No oral lesions. No thrush. Moist mucous membranes.   Head Normocephalic. Symmetrical. Atraumatic.    Nose No septal deviation. No drainage   Chest Wall Normal " shape. Symmetric expansion with respiration. No tenderness.   Neck Trachea midline, no thyromegaly or adenopathy    Lungs Clear to auscultation bilaterally. No wheezes. No rhonchi. No rales. Respirations regular, even and unlabored.   Heart Regular rhythm and normal rate. Normal S1 and S2. No murmur   Abdomen Soft, non-tender and non-distended. Normal bowel sounds. No masses.   Extremities Moves all extremities well. No edema   Psychiatric Normal mood and affect.     Testing:    Study-not yet performed.    Impression:  1. Hypersomnia    2. Insomnia, unspecified type    3. Other fatigue    4. Loud snoring          Plan:  Meenakshi requires assessment for sleep disorder breathing with either HST or in lab PSG. Since in lab PSG was previously denied, I ordered a home sleep apnea test that she can do more conveniently. We reviewed MARIE pathophysiology, treatment options, and adverse outcomes associated with untreated sleep disorder breathing. Caution to avoid activities requiring prolonged concentration.    Follow up with us after testing.    Thank you for allowing me to participate in your patient's care.      RASHIDA Bahena  [facility]  Phone: [phone]      Part of this note may be an electronic transcription/translation of spoken language to printed text using the Dragon Dictation System.

## 2024-08-26 ENCOUNTER — TELEMEDICINE (OUTPATIENT)
Dept: PSYCHIATRY | Facility: CLINIC | Age: 59
End: 2024-08-26
Payer: MEDICARE

## 2024-08-26 DIAGNOSIS — F43.81 PROLONGED GRIEF DISORDER: Primary | ICD-10-CM

## 2024-08-26 DIAGNOSIS — F33.2 SEVERE EPISODE OF RECURRENT MAJOR DEPRESSIVE DISORDER, WITHOUT PSYCHOTIC FEATURES: ICD-10-CM

## 2024-08-26 DIAGNOSIS — F41.1 GENERALIZED ANXIETY DISORDER: ICD-10-CM

## 2024-08-26 PROCEDURE — 1159F MED LIST DOCD IN RCRD: CPT | Performed by: NURSE PRACTITIONER

## 2024-08-26 PROCEDURE — 99214 OFFICE O/P EST MOD 30 MIN: CPT | Performed by: NURSE PRACTITIONER

## 2024-08-26 PROCEDURE — 1160F RVW MEDS BY RX/DR IN RCRD: CPT | Performed by: NURSE PRACTITIONER

## 2024-08-26 NOTE — PROGRESS NOTES
"This provider is located at home office in [place] for [facility], [address], [place], [address] using a secure MyChart Video Visit through [employer]. Patient is being seen remotely via telehealth at their home address in [place], and stated they are in a secure environment for this session. The patient's condition being diagnosed/treated is appropriate for telemedicine. The provider identified herself as well as her credentials.   The patient, and/or patients guardian, consent to be seen remotely, and when consent is given they understand that the consent allows for patient identifiable information to be sent to a third party as needed.   They may refuse to be seen remotely at any time. The electronic data is encrypted and password protected, and the patient and/or guardian has been advised of the potential risks to privacy not withstanding such measures.   PT Identifiers used: Name and .    You have chosen to receive care through a telehealth visit.  Do you consent to use a video/audio connection for your medical care today? Yes      Subjective   Meenakshi Mae is a 59 y.o. female who presents today for follow up    Chief Complaint:  \"depression, anxiety, grief\"    History of Present Illness:     History of Present Illness  Patient reported she had a good time in [place] with her friend.  However her anxiety has been increased for the last week due to not receiving her paycheck.  She reports she has called where she is supposed to be get her money from but no one is helping her and this is increased stress.  She also reports that the anniversary of her mother's death is coming up, , and this is causing stress as well.  Patient is tearful when discussing this.  She is still exercising, and also working out some stress with housework.  She reports she is sleeping a little bit better since moving the paroxetine to nighttime dosing.  Remains engaged in psychotherapy.  Previous " "PHQ-9 was 6, patient did not complete today  Previous LYNN-7 score was 4, but today is elevated at 12, again patient reports increased stress from not receiving her paycheck.               Generalized Anxiety Disorder 7-Item (LYNN-7) Screening Tool  Feeling nervous, anxious or on edge: (P) More than half the days  Not being able to stop or control worrying: (P) More than half the days  Worrying too much about different things: (P) More than half the days  Trouble Relaxing: (P) More than half the days  Being so restless that it is hard to sit still: (P) Several days  Feeling afraid as if something awful might happen: (P) More than half the days  Becoming easily annoyed or irritable: (P) Several days  LYNN 7 Total Score: (P) 12  If you checked any problems, how difficult have these problems made it for you to do your work, take care of things at home, or get along with other people: (P) Somewhat difficult    The following portions of the patient's history were reviewed and updated as appropriate: allergies, current medications, past family history, past medical history, past social history, past surgical history and problem list.    Past Psychiatric History:  Began Treatment: Several years ago  Diagnoses:Depression, Anxiety, and TSD, grief  Psychiatrist:Denies  Therapist: Will begin engaging in psychotherapy on a regular basis with Solo Leblanc LCSW  Admission History:Denies  Medication Trials: Medication trials include Lunesta, trazodone, Zoloft, clonazepam, Wellbutrin, paroxetine.  Self Harm: Denies  Suicide Attempts:Denies  Denies any history of a head injury, patient reported an medical history in 1999 she had \"mild seizure.\"  Denies any history of psychosis or hallucinations    Past Medical History:  Past Medical History:   Diagnosis Date    ADHD (attention deficit hyperactivity disorder)     Anemia     Anxiety     Arthritis of back 2015    Bulge of lumbar disc without myelopathy 07/10/2017    CTS (carpal tunnel " syndrome) 2009    Depression     H/O transfusion of packed red blood cells     Head injury     Headache     Healthcare maintenance 2016    History of low back pain     Malignant neoplasm of breast 2013    Right, T2N1M0, Stage IIB    Obsessive-compulsive disorder     PTSD (post-traumatic stress disorder)     Seizures 1999    Very mild    Thoracic disc disorder 2015       Substance Abuse History:   Types:Denies all, including illicit       Social History:  Social History     Socioeconomic History    Marital status: Legally     Number of children: 2    Years of education: College   Tobacco Use    Smoking status: Former     Current packs/day: 0.00     Types: Cigarettes     Start date: 1979     Quit date: 1980     Years since quittin.1    Smokeless tobacco: Former    Tobacco comments:     Smoked from age [age].   Vaping Use    Vaping status: Never Used   Substance and Sexual Activity    Alcohol use: Yes     Alcohol/week: 1.0 standard drink of alcohol     Types: 1 Glasses of wine per week     Comment: Occasional    Drug use: No    Sexual activity: Yes     Partners: Male     Birth control/protection: Condom   Minimal support system with some friends, patient reports Restorationist belief system and attends [employer] at [facility].  No history of  service.  Patient is on disability benefits, previously worked at [employer].    Family History:  Family History   Problem Relation Age of Onset    Breast cancer Mother     Heart disease Mother     Hypertension Mother     Cancer Mother         Breast cancer    Anxiety disorder Mother     OCD Mother     Stomach cancer Father     Cancer Father         Systematic    Alcohol abuse Father     Depression Father     Cancer Brother         Lung and brain cancer    Cancer Brother         Prostate cancer    Depression Brother     Ovarian cancer Maternal Aunt     Stomach cancer Maternal Aunt     Anxiety disorder Brother      Depression Brother        Past Surgical History:  Past Surgical History:   Procedure Laterality Date    ABDOMINAL SURGERY      BREAST BIOPSY Right     HAND SURGERY Left 2015    HYSTERECTOMY  2008    MASTECTOMY MODIFIED RADICAL Right 02/21/2013    TRIGGER POINT INJECTION  2016       Problem List:  Patient Active Problem List   Diagnosis    Chronic constipation    Gastroesophageal reflux disease with esophagitis    Anxiety    Essential hypertension    Malignant neoplasm of upper-outer quadrant of right female breast    Atopic rhinitis    Depression    Hematuria    Neuropathy    Mild intermittent asthma without complication    Vitamin D deficiency    Healthcare maintenance    Osteopenia    Bulge of lumbar disc without myelopathy    Lumbar facet arthropathy    Synovial cyst of lumbar facet joint       Allergy:   Allergies   Allergen Reactions    Erythromycin Hives        Current Medications:   Current Outpatient Medications   Medication Sig Dispense Refill    albuterol sulfate  (90 Base) MCG/ACT inhaler Inhale 2 puffs Every 4 (Four) Hours As Needed for Wheezing. 18 g 3    amLODIPine (NORVASC) 2.5 MG tablet Take 1 tablet by mouth Daily. 90 tablet 1    buPROPion SR (Wellbutrin SR) 200 MG 12 hr tablet Take 1 tablet by mouth Every Morning. 90 tablet 0    Cholecalciferol (Vitamin D) 50 MCG (2000 UT) capsule Take 1 capsule by mouth Daily. 90 capsule 3    clonazePAM (KlonoPIN) 0.5 MG tablet Take 1 tablet by mouth 2 (Two) Times a Day As Needed for Anxiety. 60 tablet 1    cyclobenzaprine (FLEXERIL) 10 MG tablet Take 1 tablet by mouth 2 (Two) Times a Day As Needed for Muscle Spasms. 30 tablet 4    diclofenac (VOLTAREN) 1 % gel gel APPLY 4 GRAMS TO AFFECTED AREA(S) FOUR TIMES A DAY 1 tube 4    doxepin (SINEquan) 10 MG capsule Take 1 capsule by mouth Every Night. 90 capsule 3    fluticasone (FLONASE) 50 MCG/ACT nasal spray SPRAY 2 SPRAYS INTO THE NOSTRIL DAILY AS DIRECTED BY PROVIDER 48 mL 1    methylPREDNISolone  (MEDROL) 4 MG dose pack Take as directed on package instructions. 21 tablet 0    PARoxetine (PAXIL) 40 MG tablet Take 1 tablet by mouth Every Morning. 90 tablet 3    rosuvastatin (Crestor) 5 MG tablet Take 1 tablet by mouth Daily. 90 tablet 0    vitamin B-12 (CYANOCOBALAMIN) 1000 MCG tablet Take 1 tablet by mouth Daily. 90 tablet 3     No current facility-administered medications for this visit.       Review of Systems:    Review of Systems   Constitutional:  Positive for fatigue.   Psychiatric/Behavioral:  Positive for depressed mood and stress. The patient is nervous/anxious.          Physical Exam:   Physical Exam  Vitals reviewed.   Constitutional:       Appearance: Normal appearance. She is well-developed and well-groomed.   HENT:      Head: Normocephalic.   Neurological:      Mental Status: She is alert.   Psychiatric:         Attention and Perception: Attention normal.         Mood and Affect: Mood is anxious and depressed.         Speech: Speech normal.         Behavior: Behavior is cooperative.         Vitals:  There were no vitals taken for this visit. There is no height or weight on file to calculate BMI.  Due to extenuating circumstances and possible current health risks associated with the patient being present in a clinical setting (with current health restrictions in place in regards to possible COVID 19 transmission/exposure), the patient was seen remotely today via a MyChart Video Visit through [facility] and telephone encounter.  Unable to obtain vital signs due to nature of remote visit.  Height stated at 67 inches.  Weight stated at 172 pounds.    Last 3 Blood Pressure Readings:  BP Readings from Last 3 Encounters:   08/19/24 124/77   07/09/24 140/72   04/29/24 142/81        Mental Status Exam:   Hygiene:   good  Cooperation:  Cooperative  Eye Contact:  Good  Psychomotor Behavior:  Appropriate  Affect:   Full range but very tearful at times  Mood: sad, depressed, and anxious  Hopelessness:  Denies  Speech:  Normal  Thought Process:  Goal directed and Linear  Thought Content:  Normal  Suicidal:  None  Homicidal:  None  Hallucinations:  None  Delusion:  None  Memory:  Intact  Orientation:  Person, Place, Time, and Situation  Reliability:  good  Insight:  Good  Judgement:  Good  Impulse Control:  Good  Physical/Medical Issues:  Yes see medical history        Lab Results:   No visits with results within 1 Month(s) from this visit.   Latest known visit with results is:   Office Visit on 07/09/2024   Component Date Value Ref Range Status    TSH 07/09/2024 1.480  0.270 - 4.200 uIU/mL Final    25 Hydroxy, Vitamin D 07/09/2024 23.8 (L)  30.0 - 100.0 ng/ml Final    Comment: Reference Range for Total Vitamin D 25(OH)  Deficiency <20.0 ng/mL  Insufficiency 21-29 ng/mL  Sufficiency  ng/mL  Toxicity >100 ng/ml      Vitamin B-12 07/09/2024 544  211 - 946 pg/mL Final    Results may be falsely increased if patient taking Biotin.    Folate 07/09/2024 7.65  4.78 - 24.20 ng/mL Final    Results may be falsely increased if patient taking Biotin.    Glucose 07/09/2024 70  65 - 99 mg/dL Final    BUN 07/09/2024 6 (L)  8 - 23 mg/dL Final    Creatinine 07/09/2024 0.85  0.57 - 1.00 mg/dL Final    EGFR Result 07/09/2024 78.1  >60.0 mL/min/1.73 Final    Comment: GFR Normal >60  Chronic Kidney Disease <60  Kidney Failure <15      BUN/Creatinine Ratio 07/09/2024 7.1  7.0 - 25.0 Final    Sodium 07/09/2024 142  136 - 145 mmol/L Final    Potassium 07/09/2024 4.6  3.5 - 5.2 mmol/L Final    Chloride 07/09/2024 104  98 - 107 mmol/L Final    Total CO2 07/09/2024 25.4  22.0 - 29.0 mmol/L Final    Calcium 07/09/2024 9.8  8.6 - 10.5 mg/dL Final    Total Protein 07/09/2024 7.1  6.0 - 8.5 g/dL Final    Albumin 07/09/2024 4.9  3.5 - 5.2 g/dL Final    Globulin 07/09/2024 2.2  gm/dL Final    A/G Ratio 07/09/2024 2.2  g/dL Final    Total Bilirubin 07/09/2024 0.5  0.0 - 1.2 mg/dL Final    Alkaline Phosphatase 07/09/2024 147 (H)  39 - 117 U/L  "Final    AST (SGOT) 07/09/2024 26  1 - 32 U/L Final    ALT (SGPT) 07/09/2024 36 (H)  1 - 33 U/L Final            Assessment & Plan   Diagnoses and all orders for this visit:    1. Prolonged grief disorder (Primary)    2. Generalized anxiety disorder    3. Severe episode of recurrent major depressive disorder, without psychotic features          Visit Diagnoses:    ICD-10-CM ICD-9-CM   1. Prolonged grief disorder  F43.81 309.1   2. Generalized anxiety disorder  F41.1 300.02   3. Severe episode of recurrent major depressive disorder, without psychotic features  F33.2 296.33           GOALS:  Short Term Goals: Patient will be compliant with medication, and patient will have no significant medication related side effects.  Patient will be engaged in psychotherapy as indicated.  Patient will report subjective improvement of symptoms.  Long term goals: To stabilize mood and treat/improve subjective symptoms, the patient will stay out of the hospital, the patient will be at an optimal level of functioning, and the patient will take all medications as prescribed.  The patient/guardian verbalized understanding and agreement with goals that were mutually set.      RISK ASSESSMENT  Current harm-to-self risk is reported by pt as \"none.\"  Current [id number] risk is reported by pt as \"none.\"   No suicidal thoughts, intent, plan is appreciated by this provider on this date of exam.   No homicidal thoughts, intent, plan is appreciated by this provider on this date.    TREATMENT PLAN: Continue supportive psychotherapy efforts and medications as indicated.   Pharmacological and Non-Pharmacological treatment options discussed during today's visit. Patient/Guardian acknowledged and verbally consented with current treatment plan and was educated on the importance of compliance with treatment and follow-up appointments.      MEDICATION ISSUES:  Discussed medication options and treatment plan of prescribed medication as well as the " risks, benefits, any black box warnings, and side effects including potential falls, possible impaired driving, and metabolic adversities among others. Patient is agreeable to call the office with any worsening of symptoms or onset of side effects, or if any concerns or questions arise.  The contact information for the office is made available to the patient. Patient is agreeable to call 911 or go to the nearest ER should they begin having any SI/HI, or if any urgent concerns arise. No medication side effects or related complaints today.        Continue Wellbutrin 200mg SR every morning.  Continue doxepin for insomnia  Continue paroxetine 40 mg at bedtime      MEDS ORDERED DURING VISIT:  No orders of the defined types were placed in this encounter.      Follow Up Appointment:   Return in about 1 month (around 9/26/2024) for Recheck, Video visit.         This patient will not be seen for the in person visits due to the following exception(s): the patient does not have access to another provider in his/her area.    It is my professional opinion that the patient is clinically stable and an in person visit will risk worsening the patient's condition creating undue hardship.         This document has been electronically signed by RASHIDA Aldridge  August 26, 2024 16:42 EDT    Dictated Utilizing Dragon Dictation: Part of this note may be an electronic transcription/translation of spoken language to printed text using the Dragon Dictation System.

## 2024-08-27 ENCOUNTER — HOSPITAL ENCOUNTER (OUTPATIENT)
Dept: SLEEP MEDICINE | Facility: HOSPITAL | Age: 59
End: 2024-08-27
Payer: MEDICARE

## 2024-08-27 DIAGNOSIS — R06.83 LOUD SNORING: ICD-10-CM

## 2024-08-27 DIAGNOSIS — R53.83 OTHER FATIGUE: ICD-10-CM

## 2024-08-27 DIAGNOSIS — G47.00 INSOMNIA, UNSPECIFIED TYPE: ICD-10-CM

## 2024-08-27 DIAGNOSIS — G47.10 HYPERSOMNIA: ICD-10-CM

## 2024-08-27 PROCEDURE — 95806 SLEEP STUDY UNATT&RESP EFFT: CPT

## 2024-09-06 ENCOUNTER — TELEPHONE (OUTPATIENT)
Dept: INTERNAL MEDICINE | Facility: CLINIC | Age: 59
End: 2024-09-06
Payer: MEDICARE

## 2024-09-06 NOTE — TELEPHONE ENCOUNTER
[employer] called and stated that they received the sample but no an order. She said for ma staff to got to [employer] portal and enter order please.

## 2024-09-09 ENCOUNTER — TELEMEDICINE (OUTPATIENT)
Dept: PSYCHIATRY | Facility: CLINIC | Age: 59
End: 2024-09-09
Payer: MEDICARE

## 2024-09-09 DIAGNOSIS — F43.81 PROLONGED GRIEF DISORDER: Primary | ICD-10-CM

## 2024-09-09 DIAGNOSIS — F33.2 SEVERE EPISODE OF RECURRENT MAJOR DEPRESSIVE DISORDER, WITHOUT PSYCHOTIC FEATURES: ICD-10-CM

## 2024-09-09 DIAGNOSIS — F41.1 GENERALIZED ANXIETY DISORDER: ICD-10-CM

## 2024-09-09 PROCEDURE — 1160F RVW MEDS BY RX/DR IN RCRD: CPT | Performed by: SOCIAL WORKER

## 2024-09-09 PROCEDURE — 90832 PSYTX W PT 30 MINUTES: CPT | Performed by: SOCIAL WORKER

## 2024-09-09 PROCEDURE — 1159F MED LIST DOCD IN RCRD: CPT | Performed by: SOCIAL WORKER

## 2024-09-09 NOTE — PROGRESS NOTES
[facility]   Follow-up Progress Note     Date: September 9, 2024  Time In: 11:03  Time Out: 11:26      PROGRESS NOTE  Data:  Meenakshi Mae is a 59 y.o. female presenting to [facility] (through [facility]), [address] [facility] [place], [address] using a secure MyChart Video Visit through [facility] for assessment with Solo Leblanc LCSW. The patient is seen remotely in their  work  using [facility] My Chart. Patient is being seen via telehealth and stated they are in a secure environment for this session. The patient's condition being diagnosed/treated is appropriate for telemedicine. The provider identified herself as well as her credentials. The patient and/or patients guardian consent to be seen remotely, and when consent is given they understand that the consent allows for patient identifiable information to be sent to a third party as needed. They may refuse to be seen remotely at any time. The electronic data is encrypted and password protected, and the patient has been advised of the potential risks to privacy not withstanding such measures.    Today Patient reported she started a new job. Patient expressed it has been helpful to focus on her new job. Patient reported she will be working four days a week. Patient reported her neighbor had a massive heart attack so she also been helping her neighbor. Patient expressed September is a challenging month because of the anniversary of the death of her mother. Discussed with patient recognizing what is making her feel sadness and then allowing herself to acknowledge the trigger but refocusing on the present. Engaged patient grounding exercise to assist with refocusing her thoughts on the present.       Clinical Maneuvering/Intervention:    Chief Complaint: grief, anxiety, depression    (Scales based on 0 - 10 with 10 being the worst)  Depression: 8 Anxiety: 5      Assisted Patient in processing above session content; acknowledged and normalized patient’s thoughts, feelings, and concerns.  Rationalized patient thought process regarding coping with increased moments of sadness.  Discussed triggers associated with patient's depression.  Also discussed coping skills for patient to implement such as grounding to refocus on thoughts on the present.    Allowed Patient to freely discuss issues  without interruption or judgement with unconditional positive regard, active listening skills, and empathy. Therapist provided a safe, confidential environment to facilitate the development of a positive therapeutic relationship and encouraged open, honest communication. Assisted Patient in identifying risk factors which would indicate the need for higher level of care including thoughts to harm self or others and/or self-harming behavior and encouraged Patient to contact this office, call 911, or present to the nearest emergency room should any of these events occur. Discussed crisis intervention services and means to access. Patient adamantly and convincingly denies current suicidal or homicidal ideation or perceptual disturbance. Assisted Patient in processing session content; acknowledged and normalized Patient’s thoughts, feelings, and concerns by utilizing a person-centered approach in efforts to build appropriate rapport and a positive therapeutic relationship with open and honest communication. Therapist utilized dialectical behavior techniques to teach and model emotional regulation and relaxation methods. Therapist assisted Patient with identifying and implementing healthier coping strategies.     Assessment   Patient appears to be experiencing heightened anxiety and depression in response to COVID-19 epidemic  As a result, they can be reasonably expected to continue to benefit from treatment and would likely be at increased risk for decompensation otherwise.    Mental Status  Exam:   Hygiene:   good  Cooperation:  Cooperative  Eye Contact:  Good  Psychomotor Behavior:  Appropriate  Affect:  Full range  Mood: normal  Speech:  Normal  Thought Process:  Goal directed  Thought Content:  Mood congruent  Suicidal:  None  Homicidal:  None  Hallucinations:  None  Delusion:  None  Memory:  Intact  Orientation:  Person, Place, Time, and Situation  Reliability:  good  Insight:  Good  Judgement:  Good  Impulse Control:  Good  Physical/Medical Issues:  No      PHQ-Score Total:  PHQ-9 Total Score:        Patient's Support Network Includes:  extended family and friend    Functional Status: Moderate impairment     Progress toward goal: Not at goal    Prognosis: Good with Ongoing Treatment            Impression/Formulation:    VISIT DIAGNOSIS:     ICD-10-CM ICD-9-CM   1. Prolonged grief disorder  F43.81 309.1   2. Severe episode of recurrent major depressive disorder, without psychotic features  F33.2 296.33   3. Generalized anxiety disorder  F41.1 300.02        Patient appeared alert and oriented.  Patient is voluntarily requesting to continue outpatient therapy at [facility].  Patient is receptive to assistance with maintaining a stable lifestyle.  Patient presents with history of anxiety, depression, and grief.  Patient is agreeable to attend routine therapy sessions.  Patient expressed desire to maintain stability and participate in the therapeutic process.        Crisis Plan:  Symptoms and/or behaviors to indicate a crisis: Prolonged irritability or anger    What calming techniques or other strategies will patient use to de-esclate and stay safe: slow down, breathe, visualize calming self, think it though, listen to music, change focus, take a walk    Who is one person patient can contact to assist with de-escalation?     If symptoms/behaviors persist, Patient will present to the nearest hospital for an assessment. Advised patient of [facility] ER and  assessment services.     Plan:   Patient will continue in individual outpatient therapy with focus on improved functioning and coping skills, maintaining stability, and avoiding decompensation and the need for higher level of care.    Patient will contact this office ([facility] at [phone]), call 911 or present to the nearest emergency room should suicidal or homicidal ideations occur. Provide Cognitive Behavioral Therapy and Solution Focused Therapy to improve functioning, maintain stability, and avoid decompensation and the need for higher level of care.     Return in about 3 weeks, or earlier if symptoms worsen or fail to improve.    Recommended Referrals: none        This document has been electronically signed by Solo Leblanc LCSW  September 9, 2024 11:03 EDT        Part of this note may be an electronic transcription/translation of spoken language to printed text using the Dragon Dictation System.

## 2024-09-10 ENCOUNTER — TELEPHONE (OUTPATIENT)
Dept: SLEEP MEDICINE | Facility: HOSPITAL | Age: 59
End: 2024-09-10
Payer: MEDICARE

## 2024-09-24 ENCOUNTER — OFFICE VISIT (OUTPATIENT)
Dept: INTERNAL MEDICINE | Facility: CLINIC | Age: 59
End: 2024-09-24
Payer: MEDICARE

## 2024-09-24 VITALS
DIASTOLIC BLOOD PRESSURE: 78 MMHG | OXYGEN SATURATION: 95 % | BODY MASS INDEX: 27 KG/M2 | HEIGHT: 67 IN | SYSTOLIC BLOOD PRESSURE: 132 MMHG | WEIGHT: 172 LBS | HEART RATE: 97 BPM

## 2024-09-24 DIAGNOSIS — M25.511 ACUTE PAIN OF RIGHT SHOULDER: Primary | ICD-10-CM

## 2024-09-24 PROCEDURE — 1159F MED LIST DOCD IN RCRD: CPT | Performed by: INTERNAL MEDICINE

## 2024-09-24 PROCEDURE — 3075F SYST BP GE 130 - 139MM HG: CPT | Performed by: INTERNAL MEDICINE

## 2024-09-24 PROCEDURE — 3078F DIAST BP <80 MM HG: CPT | Performed by: INTERNAL MEDICINE

## 2024-09-24 PROCEDURE — 99214 OFFICE O/P EST MOD 30 MIN: CPT | Performed by: INTERNAL MEDICINE

## 2024-09-24 PROCEDURE — 1160F RVW MEDS BY RX/DR IN RCRD: CPT | Performed by: INTERNAL MEDICINE

## 2024-09-24 PROCEDURE — 1125F AMNT PAIN NOTED PAIN PRSNT: CPT | Performed by: INTERNAL MEDICINE

## 2024-09-24 RX ORDER — BUDESONIDE AND FORMOTEROL FUMARATE DIHYDRATE 160; 4.5 UG/1; UG/1
AEROSOL RESPIRATORY (INHALATION)
COMMUNITY
Start: 2024-09-11

## 2024-09-26 ENCOUNTER — TELEMEDICINE (OUTPATIENT)
Dept: PSYCHIATRY | Facility: CLINIC | Age: 59
End: 2024-09-26
Payer: MEDICARE

## 2024-09-26 DIAGNOSIS — F41.1 GENERALIZED ANXIETY DISORDER: ICD-10-CM

## 2024-09-26 DIAGNOSIS — F43.81 PROLONGED GRIEF DISORDER: ICD-10-CM

## 2024-09-26 DIAGNOSIS — F33.2 SEVERE EPISODE OF RECURRENT MAJOR DEPRESSIVE DISORDER, WITHOUT PSYCHOTIC FEATURES: Primary | ICD-10-CM

## 2024-09-26 RX ORDER — CLONAZEPAM 0.5 MG/1
0.5 TABLET ORAL 2 TIMES DAILY PRN
Qty: 60 TABLET | Refills: 1 | Status: SHIPPED | OUTPATIENT
Start: 2024-09-26

## 2024-09-26 RX ORDER — BUPROPION HYDROCHLORIDE 200 MG/1
200 TABLET, EXTENDED RELEASE ORAL EVERY MORNING
Qty: 90 TABLET | Refills: 1 | Status: SHIPPED | OUTPATIENT
Start: 2024-09-26

## 2024-10-21 ENCOUNTER — TELEMEDICINE (OUTPATIENT)
Dept: PSYCHIATRY | Facility: CLINIC | Age: 59
End: 2024-10-21
Payer: MEDICARE

## 2024-10-21 DIAGNOSIS — F41.1 GENERALIZED ANXIETY DISORDER: ICD-10-CM

## 2024-10-21 DIAGNOSIS — F43.81 PROLONGED GRIEF DISORDER: Primary | ICD-10-CM

## 2024-10-21 DIAGNOSIS — F33.2 SEVERE EPISODE OF RECURRENT MAJOR DEPRESSIVE DISORDER, WITHOUT PSYCHOTIC FEATURES: ICD-10-CM

## 2024-10-21 PROCEDURE — 1159F MED LIST DOCD IN RCRD: CPT | Performed by: SOCIAL WORKER

## 2024-10-21 PROCEDURE — 90834 PSYTX W PT 45 MINUTES: CPT | Performed by: SOCIAL WORKER

## 2024-10-21 PROCEDURE — 1160F RVW MEDS BY RX/DR IN RCRD: CPT | Performed by: SOCIAL WORKER

## 2024-10-21 NOTE — PROGRESS NOTES
"[facility]   Follow-up Progress Note     Date: October 21, 2024  Time In: 1:43  Time Out: 2:31      PROGRESS NOTE  Data:  Meenakshi Mae is a 59 y.o. female presenting to [facility] (through [facility]), [address] [place], [address] using a secure [employer] Video Visit through [facility] for assessment with Solo Leblanc LCSW. The patient is seen remotely in their  in a private space while visiting her brother  using [facility] My Chart. Patient is being seen via telehealth and stated they are in a secure environment for this session. The patient's condition being diagnosed/treated is appropriate for telemedicine. The provider identified herself as well as her credentials. The patient and/or patients guardian consent to be seen remotely, and when consent is given they understand that the consent allows for patient identifiable information to be sent to a third party as needed. They may refuse to be seen remotely at any time. The electronic data is encrypted and password protected, and the patient has been advised of the potential risks to privacy not withstanding such measures.    Today Patient reported that her brother was hospitalized for health issues. Patient stated he was admitted on Saturday in critical condition and was given only 24 hours to live. Patient stated he is feeling better today. Patient stated her mother's birthday was \"a hard time.\" Patient reported \"I've been an emotional wreck ever since.\" Patient stated she is hopeful that it will not be \"the same mess it was with mom.\" Patient stated this will be the first sibling if her brother passes. Patient stated he was admitted for dehydration which was the same thing that lead to her mother's death. Patient stated her nephew has also been struggling with his grandmother's death. Patient stated he called her for support. Patient stated it has " "highlighted the disconnect within her family. Patient expressed she feels like \"no one understands me so for him to call and tell me he feels the same way makes me feel like I'm not crazy.\" Processed with patient her mother's and emotions about her grief and her brother's condition. Patient stated she has been trying \"to do more things for me.\" Patient stated she has been traveling with her paramour and walking her dog.       Clinical Maneuvering/Intervention:    Chief Complaint: grief, anxiety, depression    (Scales based on 0 - 10 with 10 being the worst)  Depression: 6 Anxiety: 0     Assisted Patient in processing above session content; acknowledged and normalized patient’s thoughts, feelings, and concerns.  Rationalized patient thought process regarding her mother's birthday and her brother being admitted to the hospital in critical condition.  Discussed triggers associated with patient's grief.  Also discussed coping skills for patient to implement such as focusing on the present and prioritizing patient's needs.    Allowed Patient to freely discuss issues  without interruption or judgement with unconditional positive regard, active listening skills, and empathy. Therapist provided a safe, confidential environment to facilitate the development of a positive therapeutic relationship and encouraged open, honest communication. Assisted Patient in identifying risk factors which would indicate the need for higher level of care including thoughts to harm self or others and/or self-harming behavior and encouraged Patient to contact this office, call 911, or present to the nearest emergency room should any of these events occur. Discussed crisis intervention services and means to access. Patient adamantly and convincingly denies current suicidal or homicidal ideation or perceptual disturbance. Assisted Patient in processing session content; acknowledged and normalized Patient’s thoughts, feelings, and concerns by " utilizing a person-centered approach in efforts to build appropriate rapport and a positive therapeutic relationship with open and honest communication. Therapist utilized dialectical behavior techniques to teach and model emotional regulation and relaxation methods. Therapist assisted Patient with identifying and implementing healthier coping strategies.     Assessment   Patient appears to be experiencing heightened anxiety and depression in response to COVID-19 epidemic  As a result, they can be reasonably expected to continue to benefit from treatment and would likely be at increased risk for decompensation otherwise.    Mental Status Exam:   Hygiene:   good  Cooperation:  Cooperative  Eye Contact:  Good  Psychomotor Behavior:  Appropriate  Affect:  Full range  Mood:  relieved  Speech:  Normal  Thought Process:  Linear  Thought Content:  Mood congruent  Suicidal:  None  Homicidal:  None  Hallucinations:  None  Delusion:  None  Memory:  Intact  Orientation:  Person, Place, Time, and Situation  Reliability:  good  Insight:  Good  Judgement:  Good  Impulse Control:  Good  Physical/Medical Issues:  No      PHQ-Score Total:  PHQ-9 Total Score:        Patient's Support Network Includes:  extended family and friend    Functional Status: Moderate impairment     Progress toward goal: Not at goal    Prognosis: Good with Ongoing Treatment            Impression/Formulation:    VISIT DIAGNOSIS:     ICD-10-CM ICD-9-CM   1. Prolonged grief disorder  F43.81 309.1   2. Severe episode of recurrent major depressive disorder, without psychotic features  F33.2 296.33   3. Generalized anxiety disorder  F41.1 300.02        Patient appeared alert and oriented.  Patient is voluntarily requesting to continue outpatient therapy at [facility].  Patient is receptive to assistance with maintaining a stable lifestyle.  Patient presents with history of anxiety, depression, and grief.  Patient is agreeable to  attend routine therapy sessions.  Patient expressed desire to maintain stability and participate in the therapeutic process.        Crisis Plan:  Symptoms and/or behaviors to indicate a crisis: Prolonged irritability or anger    What calming techniques or other strategies will patient use to de-esclate and stay safe: slow down, breathe, visualize calming self, think it though, listen to music, change focus, take a walk    Who is one person patient can contact to assist with de-escalation?     If symptoms/behaviors persist, Patient will present to the nearest hospital for an assessment. Advised patient of [facility] ER and assessment services.     Plan:   Patient will continue in individual outpatient therapy with focus on improved functioning and coping skills, maintaining stability, and avoiding decompensation and the need for higher level of care.    Patient will contact this office ([facility] at [phone]), call 911 or present to the nearest emergency room should suicidal or homicidal ideations occur. Provide Cognitive Behavioral Therapy and Solution Focused Therapy to improve functioning, maintain stability, and avoid decompensation and the need for higher level of care.     Return in about 3 weeks, or earlier if symptoms worsen or fail to improve.    Recommended Referrals: none        This document has been electronically signed by Solo Leblanc LCSW  October 21, 2024 13:43 EDT        Part of this note may be an electronic transcription/translation of spoken language to printed text using the Dragon Dictation System.

## 2024-10-23 RX ORDER — ROSUVASTATIN CALCIUM 5 MG/1
5 TABLET, COATED ORAL DAILY
Qty: 90 TABLET | Refills: 3 | Status: SHIPPED | OUTPATIENT
Start: 2024-10-23

## 2024-10-25 DIAGNOSIS — F33.2 SEVERE EPISODE OF RECURRENT MAJOR DEPRESSIVE DISORDER, WITHOUT PSYCHOTIC FEATURES: ICD-10-CM

## 2024-10-25 RX ORDER — PAROXETINE 40 MG/1
40 TABLET, FILM COATED ORAL EVERY MORNING
Qty: 90 TABLET | Refills: 3 | Status: SHIPPED | OUTPATIENT
Start: 2024-10-25

## 2024-10-25 RX ORDER — DOXEPIN HYDROCHLORIDE 10 MG/1
10 CAPSULE ORAL NIGHTLY
Qty: 90 CAPSULE | Refills: 3 | Status: SHIPPED | OUTPATIENT
Start: 2024-10-25

## 2024-10-25 RX ORDER — BUPROPION HYDROCHLORIDE 200 MG/1
200 TABLET, EXTENDED RELEASE ORAL EVERY MORNING
Qty: 90 TABLET | Refills: 3 | Status: SHIPPED | OUTPATIENT
Start: 2024-10-25

## 2024-10-28 RX ORDER — BUDESONIDE AND FORMOTEROL FUMARATE DIHYDRATE 160; 4.5 UG/1; UG/1
2 AEROSOL RESPIRATORY (INHALATION)
Qty: 1 EACH | Refills: 5 | Status: SHIPPED | OUTPATIENT
Start: 2024-10-28

## 2024-10-28 NOTE — TELEPHONE ENCOUNTER
Caller: [facility] - [place], [place] - [address] - [phone] [facility] [phone] FX    Relationship: Pharmacy    Best call back number: [phone]     Requested Prescriptions:   Requested Prescriptions     Pending Prescriptions Disp Refills    Symbicort 160-4.5 MCG/ACT inhaler          Pharmacy where request should be sent: [facility] - [place], [place] - [address] - [phone] [facility] [phone] FX     Last office visit with prescribing clinician: 9/24/2024   Last telemedicine visit with prescribing clinician: Visit date not found   Next office visit with prescribing clinician: 11/12/2024     Additional details provided by patient:     Does the patient have less than a 3 day supply:  [x] Yes  [] No    Would you like a call back once the refill request has been completed: [] Yes [] No    If the office needs to give you a call back, can they leave a voicemail: [] Yes [] No    Epifanio Valerio Rep   10/28/24 14:19 EDT

## 2024-12-31 RX ORDER — CYCLOBENZAPRINE HCL 10 MG
10 TABLET ORAL 2 TIMES DAILY PRN
Qty: 30 TABLET | Refills: 4 | Status: SHIPPED | OUTPATIENT
Start: 2024-12-31

## 2025-03-28 DIAGNOSIS — F41.1 GENERALIZED ANXIETY DISORDER: ICD-10-CM

## 2025-03-28 RX ORDER — ALBUTEROL SULFATE 90 UG/1
2 INHALANT RESPIRATORY (INHALATION) EVERY 4 HOURS PRN
Qty: 18 G | Refills: 3 | Status: SHIPPED | OUTPATIENT
Start: 2025-03-28

## 2025-03-31 RX ORDER — CLONAZEPAM 0.5 MG/1
0.5 TABLET ORAL 2 TIMES DAILY PRN
Qty: 60 TABLET | OUTPATIENT
Start: 2025-03-31

## 2025-03-31 NOTE — TELEPHONE ENCOUNTER
Pt not been seen since 09/2025. Cancelled appt in December. This a controlled medication. Needs appt, drug screen prior to prescribing.

## 2025-04-01 ENCOUNTER — TELEPHONE (OUTPATIENT)
Dept: INTERNAL MEDICINE | Facility: CLINIC | Age: 60
End: 2025-04-01
Payer: MEDICARE

## 2025-04-01 DIAGNOSIS — F33.2 SEVERE EPISODE OF RECURRENT MAJOR DEPRESSIVE DISORDER, WITHOUT PSYCHOTIC FEATURES: ICD-10-CM

## 2025-04-01 RX ORDER — AMLODIPINE BESYLATE 2.5 MG/1
2.5 TABLET ORAL DAILY
Qty: 90 TABLET | Refills: 1 | Status: SHIPPED | OUTPATIENT
Start: 2025-04-01

## 2025-04-01 RX ORDER — FLUTICASONE PROPIONATE 50 MCG
2 SPRAY, SUSPENSION (ML) NASAL DAILY
Qty: 16 G | Refills: 3 | Status: SHIPPED | OUTPATIENT
Start: 2025-04-01

## 2025-04-01 RX ORDER — DOXEPIN HYDROCHLORIDE 10 MG/1
10 CAPSULE ORAL NIGHTLY
Qty: 90 CAPSULE | Refills: 3 | Status: SHIPPED | OUTPATIENT
Start: 2025-04-01

## 2025-04-01 RX ORDER — ALBUTEROL SULFATE 90 UG/1
2 INHALANT RESPIRATORY (INHALATION) EVERY 4 HOURS PRN
Qty: 18 G | Refills: 3 | Status: SHIPPED | OUTPATIENT
Start: 2025-04-01

## 2025-04-01 RX ORDER — BUPROPION HYDROCHLORIDE 200 MG/1
200 TABLET, EXTENDED RELEASE ORAL EVERY MORNING
Qty: 90 TABLET | Refills: 3 | Status: SHIPPED | OUTPATIENT
Start: 2025-04-01

## 2025-04-01 NOTE — TELEPHONE ENCOUNTER
Call patient.  Her last two fills of clonazepam was from a [facility].  Per controlled substance agreement you need to getting this from one provider.  ORQUIDEA

## 2025-04-04 ENCOUNTER — TRANSCRIBE ORDERS (OUTPATIENT)
Dept: ADMINISTRATIVE | Facility: HOSPITAL | Age: 60
End: 2025-04-04
Payer: MEDICARE

## 2025-04-04 DIAGNOSIS — Z12.31 VISIT FOR SCREENING MAMMOGRAM: Primary | ICD-10-CM

## 2025-04-04 DIAGNOSIS — F41.1 GENERALIZED ANXIETY DISORDER: ICD-10-CM

## 2025-04-04 RX ORDER — CLONAZEPAM 0.5 MG/1
0.5 TABLET ORAL 2 TIMES DAILY PRN
Qty: 60 TABLET | Refills: 1 | Status: CANCELLED | OUTPATIENT
Start: 2025-04-04

## 2025-04-07 DIAGNOSIS — F41.1 GENERALIZED ANXIETY DISORDER: ICD-10-CM

## 2025-04-07 DIAGNOSIS — Z79.899 ENCOUNTER FOR LONG-TERM (CURRENT) USE OF MEDICATIONS: Primary | ICD-10-CM

## 2025-04-07 NOTE — TELEPHONE ENCOUNTER
Patient requested transfer of care to new therapist. Patient stated she feels current therapist and herself are not a good fit.     Solo, routing this to update you.

## 2025-04-07 NOTE — TELEPHONE ENCOUNTER
Left detailed message from provider. Pt needs schedule appointment also patient needs to complete UDS

## 2025-04-07 NOTE — TELEPHONE ENCOUNTER
Pt needs appointment and drug screen prior to prescribing. Last encounter was in September 2024. Pt cancelled follow up appt scheduled in December, 2024.  I will place order for drug screen. Pt will need to go to [facility] lab.

## 2025-04-10 RX ORDER — CLONAZEPAM 0.5 MG/1
0.5 TABLET ORAL 2 TIMES DAILY PRN
Qty: 60 TABLET | Refills: 1 | OUTPATIENT
Start: 2025-04-10

## 2025-04-10 NOTE — TELEPHONE ENCOUNTER
I have left a detailed voice mail making patient aware of providers message. I have also sent a my chart message asking patient to call the office back.

## 2025-04-10 NOTE — TELEPHONE ENCOUNTER
Per note from 04/07/2025-   Pt needs appointment and drug screen prior to prescribing. Last encounter was in September 2024. Pt cancelled follow up appt scheduled in December, 2024.  I will place order for drug screen. Pt will need to go to [facility] lab.       The drug screen order was placed on 04/07/2025. This is a controlled medication. Pt needs current acceptable UDS and appt to continue with prescribing of controlled medication.

## 2025-04-15 ENCOUNTER — OFFICE VISIT (OUTPATIENT)
Dept: INTERNAL MEDICINE | Facility: CLINIC | Age: 60
End: 2025-04-15
Payer: MEDICARE

## 2025-04-15 VITALS
HEIGHT: 67 IN | HEART RATE: 90 BPM | OXYGEN SATURATION: 98 % | SYSTOLIC BLOOD PRESSURE: 140 MMHG | WEIGHT: 163 LBS | BODY MASS INDEX: 25.58 KG/M2 | DIASTOLIC BLOOD PRESSURE: 58 MMHG

## 2025-04-15 DIAGNOSIS — M54.42 CHRONIC LEFT-SIDED LOW BACK PAIN WITH LEFT-SIDED SCIATICA: ICD-10-CM

## 2025-04-15 DIAGNOSIS — M51.362 DEGENERATION OF INTERVERTEBRAL DISC OF LUMBAR REGION WITH DISCOGENIC BACK PAIN AND LOWER EXTREMITY PAIN: Primary | ICD-10-CM

## 2025-04-15 DIAGNOSIS — I10 HYPERTENSION, UNSPECIFIED TYPE: ICD-10-CM

## 2025-04-15 DIAGNOSIS — R73.01 IFG (IMPAIRED FASTING GLUCOSE): ICD-10-CM

## 2025-04-15 DIAGNOSIS — E55.9 VITAMIN D DEFICIENCY: ICD-10-CM

## 2025-04-15 DIAGNOSIS — F41.1 GENERALIZED ANXIETY DISORDER: ICD-10-CM

## 2025-04-15 DIAGNOSIS — F41.9 ANXIETY: ICD-10-CM

## 2025-04-15 DIAGNOSIS — G89.29 CHRONIC LEFT-SIDED LOW BACK PAIN WITH LEFT-SIDED SCIATICA: ICD-10-CM

## 2025-04-15 DIAGNOSIS — Z85.3 HISTORY OF BREAST CANCER: ICD-10-CM

## 2025-04-15 LAB
25(OH)D3+25(OH)D2 SERPL-MCNC: 15.6 NG/ML (ref 30–100)
ALBUMIN SERPL-MCNC: 4.4 G/DL (ref 3.5–5.2)
ALBUMIN/GLOB SERPL: 1.9 G/DL
ALP SERPL-CCNC: 137 U/L (ref 39–117)
ALT SERPL-CCNC: 29 U/L (ref 1–33)
AST SERPL-CCNC: 27 U/L (ref 1–32)
BASOPHILS # BLD AUTO: 0.05 10*3/MM3 (ref 0–0.2)
BASOPHILS NFR BLD AUTO: 1 % (ref 0–1.5)
BILIRUB SERPL-MCNC: 0.4 MG/DL (ref 0–1.2)
BUN SERPL-MCNC: 9 MG/DL (ref 8–23)
BUN/CREAT SERPL: 10.8 (ref 7–25)
CALCIUM SERPL-MCNC: 9.4 MG/DL (ref 8.6–10.5)
CHLORIDE SERPL-SCNC: 105 MMOL/L (ref 98–107)
CO2 SERPL-SCNC: 25.7 MMOL/L (ref 22–29)
CREAT SERPL-MCNC: 0.83 MG/DL (ref 0.57–1)
EGFRCR SERPLBLD CKD-EPI 2021: 80.3 ML/MIN/1.73
EOSINOPHIL # BLD AUTO: 0.1 10*3/MM3 (ref 0–0.4)
EOSINOPHIL NFR BLD AUTO: 2 % (ref 0.3–6.2)
ERYTHROCYTE [DISTWIDTH] IN BLOOD BY AUTOMATED COUNT: 13.4 % (ref 12.3–15.4)
GLOBULIN SER CALC-MCNC: 2.3 GM/DL
GLUCOSE SERPL-MCNC: 87 MG/DL (ref 65–99)
HBA1C MFR BLD: 5.3 % (ref 4.8–5.6)
HCT VFR BLD AUTO: 36.9 % (ref 34–46.6)
HGB BLD-MCNC: 12.1 G/DL (ref 12–15.9)
IMM GRANULOCYTES # BLD AUTO: 0 10*3/MM3 (ref 0–0.05)
IMM GRANULOCYTES NFR BLD AUTO: 0 % (ref 0–0.5)
LYMPHOCYTES # BLD AUTO: 1.92 10*3/MM3 (ref 0.7–3.1)
LYMPHOCYTES NFR BLD AUTO: 38.2 % (ref 19.6–45.3)
MCH RBC QN AUTO: 28.1 PG (ref 26.6–33)
MCHC RBC AUTO-ENTMCNC: 32.8 G/DL (ref 31.5–35.7)
MCV RBC AUTO: 85.6 FL (ref 79–97)
MONOCYTES # BLD AUTO: 0.58 10*3/MM3 (ref 0.1–0.9)
MONOCYTES NFR BLD AUTO: 11.5 % (ref 5–12)
NEUTROPHILS # BLD AUTO: 2.38 10*3/MM3 (ref 1.7–7)
NEUTROPHILS NFR BLD AUTO: 47.3 % (ref 42.7–76)
NRBC BLD AUTO-RTO: 0 /100 WBC (ref 0–0.2)
PLATELET # BLD AUTO: 290 10*3/MM3 (ref 140–450)
POTASSIUM SERPL-SCNC: 4.6 MMOL/L (ref 3.5–5.2)
PROT SERPL-MCNC: 6.7 G/DL (ref 6–8.5)
RBC # BLD AUTO: 4.31 10*6/MM3 (ref 3.77–5.28)
SODIUM SERPL-SCNC: 141 MMOL/L (ref 136–145)
TSH SERPL DL<=0.005 MIU/L-ACNC: 0.73 UIU/ML (ref 0.27–4.2)
WBC # BLD AUTO: 5.03 10*3/MM3 (ref 3.4–10.8)

## 2025-04-15 RX ORDER — CLONAZEPAM 0.5 MG/1
0.5 TABLET ORAL DAILY PRN
Qty: 30 TABLET | Refills: 1 | Status: SHIPPED | OUTPATIENT
Start: 2025-04-15

## 2025-04-15 RX ORDER — LIDOCAINE 50 MG/G
2 PATCH TOPICAL EVERY 24 HOURS
Qty: 60 PATCH | Refills: 3 | Status: SHIPPED | OUTPATIENT
Start: 2025-04-15

## 2025-04-15 NOTE — PROGRESS NOTES
Chief Complaint   Patient presents with    Back Pain    Hypertension    Hyperlipidemia    Depression    Anxiety       Back Pain    Hypertension  Associated symptoms include anxiety.   Hyperlipidemia    Depression  Her past medical history is significant for depression.   Anxiety  Her past medical history is significant for depression.      Meenakshi Mae is a 59 y.o. female presents for follow up evaluation. Patient has anxiety and depression. She notes a healthy mood. She has been off of clonazepam for 2 weeks. She notes that she feels mentally healthy at this time. She had been grieving loss of mother. She has arranged for a mammogram awareness at her [employer] for mothers day.   Patient has htn. Bp has been normotensive.   Patient has hyperlipidemia. Due for lab testing on this. Taking rosuvastatin daily and she is eating healthy nutrtion and engaged in routine activity.   Actue on chronic back pain w radiation down left side.     The following portions of the patient's history were reviewed and updated as appropriate: allergies, current medications, past family history, past medical history, past social history, past surgical history and problem list.  Current Outpatient Medications on File Prior to Visit   Medication Sig Dispense Refill    albuterol sulfate  (90 Base) MCG/ACT inhaler Inhale 2 puffs Every 4 (Four) Hours As Needed for Wheezing. 18 g 3    amLODIPine (NORVASC) 2.5 MG tablet Take 1 tablet by mouth Daily. 90 tablet 1    buPROPion SR (Wellbutrin SR) 200 MG 12 hr tablet Take 1 tablet by mouth Every Morning. 90 tablet 3    Cholecalciferol (Vitamin D) 50 MCG (2000 UT) capsule Take 1 capsule by mouth Daily. 90 capsule 3    cyclobenzaprine (FLEXERIL) 10 MG tablet TAKE 1 TABLET BY MOUTH 2 TIMES A DAY AS NEEDED FOR MUSCLE SPASMS 30 tablet 4    diclofenac (VOLTAREN) 1 % gel gel APPLY 4 GRAMS TO AFFECTED AREA(S) FOUR TIMES A DAY 1 tube 4    doxepin (SINEquan) 10 MG capsule Take 1 capsule by mouth Every  Night. 90 capsule 3    fluticasone (FLONASE) 50 MCG/ACT nasal spray Administer 2 sprays into the nostril(s) as directed by provider Daily. As directed by the provider 16 g 3    PARoxetine (PAXIL) 40 MG tablet Take 1 tablet by mouth Every Morning. 90 tablet 3    rosuvastatin (Crestor) 5 MG tablet Take 1 tablet by mouth Daily. 90 tablet 3    Symbicort 160-4.5 MCG/ACT inhaler Inhale 2 puffs 2 (Two) Times a Day. 1 each 5    vitamin B-12 (CYANOCOBALAMIN) 1000 MCG tablet Take 1 tablet by mouth Daily. 90 tablet 3    clonazePAM (KlonoPIN) 0.5 MG tablet Take 1 tablet by mouth 2 (Two) Times a Day As Needed for Anxiety. (Patient not taking: Reported on 4/15/2025) 60 tablet 1    methylPREDNISolone (MEDROL) 4 MG dose pack Take as directed on package instructions. (Patient not taking: Reported on 4/15/2025) 21 tablet 0     No current facility-administered medications on file prior to visit.     Review of Systems   Constitutional: Negative.    HENT: Negative.     Eyes: Negative.    Respiratory: Negative.     Cardiovascular: Negative.    Gastrointestinal: Negative.    Endocrine: Negative.    Genitourinary: Negative.    Musculoskeletal:  Positive for back pain.   Skin: Negative.    Allergic/Immunologic: Negative.    Neurological: Negative.    Hematological: Negative.    Psychiatric/Behavioral: Negative.         Objective   Physical Exam  Vitals and nursing note reviewed.   Constitutional:       Appearance: Normal appearance. She is well-developed.   HENT:      Head: Normocephalic and atraumatic.      Right Ear: Tympanic membrane and external ear normal.      Left Ear: Tympanic membrane and external ear normal.      Nose: Nose normal.      Mouth/Throat:      Mouth: Mucous membranes are moist.   Eyes:      Extraocular Movements: Extraocular movements intact.      Pupils: Pupils are equal, round, and reactive to light.   Cardiovascular:      Rate and Rhythm: Normal rate and regular rhythm.      Pulses: Normal pulses.      Heart sounds:  "Normal heart sounds.   Pulmonary:      Effort: Pulmonary effort is normal. No respiratory distress.      Breath sounds: Normal breath sounds.   Abdominal:      General: Abdomen is flat.      Palpations: Abdomen is soft.   Musculoskeletal:         General: Normal range of motion.      Cervical back: Normal range of motion and neck supple.   Skin:     General: Skin is warm and dry.   Neurological:      General: No focal deficit present.      Mental Status: She is alert and oriented to person, place, and time.   Psychiatric:         Mood and Affect: Mood normal.         Behavior: Behavior normal.         Thought Content: Thought content normal.         Judgment: Judgment normal.          /58   Pulse 90   Ht 170.2 cm (67\")   Wt 73.9 kg (163 lb)   LMP  (LMP Unknown)   SpO2 98%   BMI 25.53 kg/m²     Assessment & Plan   Diagnoses and all orders for this visit:    Degeneration of intervertebral disc of lumbar region with discogenic back pain and lower extremity pain  -     MRI Lumbar Spine Without Contrast; Future    Chronic left-sided low back pain with left-sided sciatica  -     MRI Lumbar Spine Without Contrast; Future  -     Ambulatory Referral to Physical Therapy for Evaluation & Treatment    History of breast cancer    Anxiety      Patient w continued back pain. Has failed conservative management for about 5 years. Acute on chronic. H/o breast cancer. Degeneration on xray. Will get MRI L spine ASAP. She will be referred to PT. Pain mgmt v neurosurg once MRI results available.   Has anxiety. No longer requireing daily clonazepam. Aware of risks and benefits. She will take prn. She will cotinue doxepin hs for sleep. She will cotinue current meds for bp. F/u prn.            "

## 2025-04-17 RX ORDER — ERGOCALCIFEROL 1.25 MG/1
50000 CAPSULE, LIQUID FILLED ORAL WEEKLY
Qty: 12 CAPSULE | Refills: 1 | Status: SHIPPED | OUTPATIENT
Start: 2025-04-17

## 2025-04-28 ENCOUNTER — OFFICE VISIT (OUTPATIENT)
Dept: ONCOLOGY | Facility: CLINIC | Age: 60
End: 2025-04-28
Payer: MEDICARE

## 2025-04-28 ENCOUNTER — LAB (OUTPATIENT)
Dept: LAB | Facility: HOSPITAL | Age: 60
End: 2025-04-28
Payer: MEDICARE

## 2025-04-28 VITALS
RESPIRATION RATE: 17 BRPM | SYSTOLIC BLOOD PRESSURE: 151 MMHG | DIASTOLIC BLOOD PRESSURE: 85 MMHG | WEIGHT: 163.1 LBS | BODY MASS INDEX: 25.6 KG/M2 | OXYGEN SATURATION: 100 % | HEIGHT: 67 IN | HEART RATE: 89 BPM | TEMPERATURE: 97.5 F

## 2025-04-28 DIAGNOSIS — C50.411 MALIGNANT NEOPLASM OF UPPER-OUTER QUADRANT OF RIGHT BREAST IN FEMALE, ESTROGEN RECEPTOR NEGATIVE: ICD-10-CM

## 2025-04-28 DIAGNOSIS — Z17.1 MALIGNANT NEOPLASM OF UPPER-OUTER QUADRANT OF RIGHT BREAST IN FEMALE, ESTROGEN RECEPTOR NEGATIVE: Primary | ICD-10-CM

## 2025-04-28 DIAGNOSIS — C50.411 MALIGNANT NEOPLASM OF UPPER-OUTER QUADRANT OF RIGHT BREAST IN FEMALE, ESTROGEN RECEPTOR NEGATIVE: Primary | ICD-10-CM

## 2025-04-28 DIAGNOSIS — Z17.1 MALIGNANT NEOPLASM OF UPPER-OUTER QUADRANT OF RIGHT BREAST IN FEMALE, ESTROGEN RECEPTOR NEGATIVE: ICD-10-CM

## 2025-04-28 LAB
BASOPHILS # BLD AUTO: 0.06 10*3/MM3 (ref 0–0.2)
BASOPHILS NFR BLD AUTO: 1.1 % (ref 0–1.5)
DEPRECATED RDW RBC AUTO: 43.6 FL (ref 37–54)
EOSINOPHIL # BLD AUTO: 0.11 10*3/MM3 (ref 0–0.4)
EOSINOPHIL NFR BLD AUTO: 2 % (ref 0.3–6.2)
ERYTHROCYTE [DISTWIDTH] IN BLOOD BY AUTOMATED COUNT: 13.9 % (ref 12.3–15.4)
HCT VFR BLD AUTO: 37 % (ref 34–46.6)
HGB BLD-MCNC: 12.1 G/DL (ref 12–15.9)
IMM GRANULOCYTES # BLD AUTO: 0.03 10*3/MM3 (ref 0–0.05)
IMM GRANULOCYTES NFR BLD AUTO: 0.5 % (ref 0–0.5)
LYMPHOCYTES # BLD AUTO: 2.14 10*3/MM3 (ref 0.7–3.1)
LYMPHOCYTES NFR BLD AUTO: 38.7 % (ref 19.6–45.3)
MCH RBC QN AUTO: 28 PG (ref 26.6–33)
MCHC RBC AUTO-ENTMCNC: 32.7 G/DL (ref 31.5–35.7)
MCV RBC AUTO: 85.6 FL (ref 79–97)
MONOCYTES # BLD AUTO: 0.49 10*3/MM3 (ref 0.1–0.9)
MONOCYTES NFR BLD AUTO: 8.9 % (ref 5–12)
NEUTROPHILS NFR BLD AUTO: 2.7 10*3/MM3 (ref 1.7–7)
NEUTROPHILS NFR BLD AUTO: 48.8 % (ref 42.7–76)
NRBC BLD AUTO-RTO: 0 /100 WBC (ref 0–0.2)
PLATELET # BLD AUTO: 307 10*3/MM3 (ref 140–450)
PMV BLD AUTO: 10 FL (ref 6–12)
RBC # BLD AUTO: 4.32 10*6/MM3 (ref 3.77–5.28)
WBC NRBC COR # BLD AUTO: 5.53 10*3/MM3 (ref 3.4–10.8)

## 2025-04-28 PROCEDURE — 36415 COLL VENOUS BLD VENIPUNCTURE: CPT

## 2025-04-28 PROCEDURE — G2211 COMPLEX E/M VISIT ADD ON: HCPCS | Performed by: INTERNAL MEDICINE

## 2025-04-28 PROCEDURE — 99214 OFFICE O/P EST MOD 30 MIN: CPT | Performed by: INTERNAL MEDICINE

## 2025-04-28 PROCEDURE — 3079F DIAST BP 80-89 MM HG: CPT | Performed by: INTERNAL MEDICINE

## 2025-04-28 PROCEDURE — 85025 COMPLETE CBC W/AUTO DIFF WBC: CPT

## 2025-04-28 PROCEDURE — 1126F AMNT PAIN NOTED NONE PRSNT: CPT | Performed by: INTERNAL MEDICINE

## 2025-04-28 PROCEDURE — 3077F SYST BP >= 140 MM HG: CPT | Performed by: INTERNAL MEDICINE

## 2025-04-28 NOTE — PROGRESS NOTES
Subjective .     REASONS FOR FOLLOWUP:  Breast cancer    HISTORY OF PRESENT ILLNESS:  The patient is a 59 y.o. year old female  who is here for follow-up with the above-mentioned history.    No new problems.  Feeling fine.  Neuropathy is tolerable.  No complaints of SOA or problems eating.  Exercising regularly.  Occasional alcohol, nothing regular.      Past Medical History:   Diagnosis Date    ADHD (attention deficit hyperactivity disorder)     Anemia     Anxiety     Arthritis of back 2015    Bulge of lumbar disc without myelopathy 07/10/2017    CTS (carpal tunnel syndrome) 2009    Depression     H/O transfusion of packed red blood cells     Head injury     Headache     Healthcare maintenance 12/02/2016    History of low back pain     Malignant neoplasm of breast 2013    Right, T2N1M0, Stage IIB    Obsessive-compulsive disorder     PTSD (post-traumatic stress disorder)     Seizures 1999    Very mild    Thoracic disc disorder 2015       HEMATOLOGIC/ONCOLOGIC HISTORY:  (History from previous dates can be found in the separate document.)    MEDICATIONS    Current Outpatient Medications:     albuterol sulfate  (90 Base) MCG/ACT inhaler, Inhale 2 puffs Every 4 (Four) Hours As Needed for Wheezing., Disp: 18 g, Rfl: 3    amLODIPine (NORVASC) 2.5 MG tablet, Take 1 tablet by mouth Daily., Disp: 90 tablet, Rfl: 1    buPROPion SR (Wellbutrin SR) 200 MG 12 hr tablet, Take 1 tablet by mouth Every Morning., Disp: 90 tablet, Rfl: 3    Cholecalciferol (Vitamin D) 50 MCG (2000 UT) capsule, Take 1 capsule by mouth Daily., Disp: 90 capsule, Rfl: 3    clonazePAM (KlonoPIN) 0.5 MG tablet, Take 1 tablet by mouth Daily As Needed for Anxiety., Disp: 30 tablet, Rfl: 1    cyclobenzaprine (FLEXERIL) 10 MG tablet, TAKE 1 TABLET BY MOUTH 2 TIMES A DAY AS NEEDED FOR MUSCLE SPASMS, Disp: 30 tablet, Rfl: 4    diclofenac (VOLTAREN) 1 % gel gel, APPLY 4 GRAMS TO AFFECTED AREA(S) FOUR TIMES A DAY, Disp: 1 tube, Rfl: 4    doxepin (SINEquan)  10 MG capsule, Take 1 capsule by mouth Every Night., Disp: 90 capsule, Rfl: 3    fluticasone (FLONASE) 50 MCG/ACT nasal spray, Administer 2 sprays into the nostril(s) as directed by provider Daily. As directed by the provider, Disp: 16 g, Rfl: 3    lidocaine (LIDODERM) 5 %, Place 2 patches on the skin as directed by provider Daily. Remove & Discard patch within 12 hours or as directed by MD, Disp: 60 patch, Rfl: 3    PARoxetine (PAXIL) 40 MG tablet, Take 1 tablet by mouth Every Morning., Disp: 90 tablet, Rfl: 3    rosuvastatin (Crestor) 5 MG tablet, Take 1 tablet by mouth Daily., Disp: 90 tablet, Rfl: 3    Symbicort 160-4.5 MCG/ACT inhaler, Inhale 2 puffs 2 (Two) Times a Day., Disp: 1 each, Rfl: 5    vitamin B-12 (CYANOCOBALAMIN) 1000 MCG tablet, Take 1 tablet by mouth Daily., Disp: 90 tablet, Rfl: 3    vitamin D (ERGOCALCIFEROL) 1.25 MG (00992 UT) capsule capsule, Take 1 capsule by mouth 1 (One) Time Per Week., Disp: 12 capsule, Rfl: 1    ALLERGIES:     Allergies   Allergen Reactions    Erythromycin Hives       SOCIAL HISTORY:       Social History     Socioeconomic History    Marital status: Legally     Number of children: 2    Years of education: College   Tobacco Use    Smoking status: Former     Current packs/day: 0.00     Types: Cigarettes     Start date: 1979     Quit date: 1980     Years since quittin.8    Smokeless tobacco: Former    Tobacco comments:     Smoked from age [age].   Vaping Use    Vaping status: Never Used   Substance and Sexual Activity    Alcohol use: Yes     Alcohol/week: 1.0 standard drink of alcohol     Types: 1 Glasses of wine per week     Comment: Occasional    Drug use: No    Sexual activity: Yes     Partners: Male     Birth control/protection: Condom         FAMILY HISTORY:  Family History   Problem Relation Age of Onset    Breast cancer Mother     Heart disease Mother     Hypertension Mother     Cancer Mother         Breast cancer    Anxiety disorder Mother      "OCD Mother     Stomach cancer Father     Cancer Father         Systematic    Alcohol abuse Father     Depression Father     Cancer Brother         Lung and brain cancer    Cancer Brother         Prostate cancer    Depression Brother     Ovarian cancer Maternal Aunt     Stomach cancer Maternal Aunt     Anxiety disorder Brother     Depression Brother        REVIEW OF SYSTEMS:  Review of Systems   Constitutional:  Negative for activity change.   HENT:  Negative for nosebleeds and trouble swallowing.    Respiratory:  Negative for shortness of breath and wheezing.    Cardiovascular:  Negative for chest pain and palpitations.   Gastrointestinal:  Negative for constipation and diarrhea.   Genitourinary:  Negative for dysuria and hematuria.   Musculoskeletal:  Negative for arthralgias and myalgias.   Skin:  Negative for rash and wound.   Neurological:  Negative for seizures and syncope.   Hematological:  Does not bruise/bleed easily.   Psychiatric/Behavioral:  Negative for confusion.           Objective    Vitals:    04/28/25 1331   BP: 151/85   Pulse: 89   Resp: 17   Temp: 97.5 °F (36.4 °C)   TempSrc: Oral   SpO2: 100%   Weight: 74 kg (163 lb 1.6 oz)   Height: 170.2 cm (67.01\")   PainSc: 0-No pain         4/28/2025     1:29 PM   Current Status   ECOG score 0      PHYSICAL EXAM:        CONSTITUTIONAL:  Vital signs reviewed.  No distress, looks comfortable.  EYES:  Conjunctiva and lids unremarkable.  PERRLA  EARS,NOSE,MOUTH,THROAT:  Ears and nose appear unremarkable.  Lips, teeth, gums appear unremarkable.  RESPIRATORY:  Normal respiratory effort.  Lungs clear to auscultation bilaterally.  CARDIOVASCULAR:  Normal S1, S2.  No murmurs rubs or gallops.  No significant lower extremity edema.  BREAST: no masses by palpation or inspection   GASTROINTESTINAL: Abdomen appears unremarkable.  Nontender.  No hepatomegaly.  No splenomegaly.  LYMPHATIC:  No cervical, supraclavicular, axillary lymphadenopathy.  SKIN:  Warm.  No " rashes.  PSYCHIATRIC:  Normal judgment and insight.  Normal mood and affect.        RECENT LABS:        WBC   Date/Time Value Ref Range Status   04/28/2025 01:27 PM 5.53 3.40 - 10.80 10*3/mm3 Final   04/15/2025 10:43 AM 5.03 3.40 - 10.80 10*3/mm3 Final     Hemoglobin   Date/Time Value Ref Range Status   04/28/2025 01:27 PM 12.1 12.0 - 15.9 g/dL Final     Platelets   Date/Time Value Ref Range Status   04/28/2025 01:27  140 - 450 10*3/mm3 Final       Assessment/Plan     ASSESSMENT:  *T2N1M0 (stage IIB) invasive ductal carcinoma of the right breast. Esther score 9 of 9 (high grade), triple-negative, 3.3 cm tumor. Right mastectomy and TRAM flap reconstruction. Completed dose-dense Adriamycin/Cytoxan with dose-dense Taxol 07/11/2013. Completed radiation October 2013.   I reminded again today we would be quite unusual for her cancer to recur this late.  There is a good probability she is cured of this cancer.    4/29/2024: She awaits skin biopsy results from the skin lesion at the site of her right mastectomy scar by her dermatologist.  She does not recall the name of the dermatologist.  The biopsy was 1 week ago.  Nothing worrisome for recurrence on exam or by history otherwise  No clinical signs of recurrence.    *Previously complained of chest discomfort upon bending over since around December 2018 or so.  I suspect this is related to her weight gain.  No signs of malignancy as the reason for the discomfort.  CT chest 5/14/2019 (done due to chest discomfort with bending over): No evidence of malignancy.    *Nausea.  IBS.  Managed by both Dr. Gallegos and Dr. Vandana Zavaleta.  No changes in chronic nausea    *. Intermittent blurry vision, left eye more so than right eye.  She has seen her ophthalmologist for this.  She states nothing concerning was found.  She follows with Dr. Kiran Cabrera of neurology for this.  A prior MRI read as possible optic neuritis.  She states the follow-up MRI was fine.  She did not complain  of this today.  No complaints of this today    *Overweight  Being overweight is a risk factor for breast cancer.  Ideally, she should lose weight.  Body mass index is 25.54 kg/m².  BMI 25 to <30 is overweight  BMI 30 to <35 is class 1 obesity  BMI 35 to <40 is class 2 obesity  BMI 40 or higher is class 3 obesity   Just slightly overweight overweight.  Ideally, lose weight.    * 2 liver lesions on 1/31/13 staging CAT scan.  Liver protocol CT subsequently read as benign liver lesions.  Liver lesions unchanged on CT 5/4/16.    * She has an annual  for breast cancer.      * Chronic sciatica and cervical pain.  Treatment through her PCP, Dr. Vandana Zavaleta.  As had epidurals.    *Previously complained of left preauricular minimally enlarged node.  Was following with Dr. Tin Ordoñez of ENT.  The node did not feel malignant my exam.  Dr. Ordoñez, last record I have for review is 4/6/18.    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  4/ 29/24: States bilateral fingertip and toe numbness and tingling.  Intermittent.  She did have some pain in her right first toe but after seeing a podiatrist this was determined to be due to arthritis which is significantly improved after compounded cream he prescribed for her.  4/28/2025: Neuropathy is tolerable.    *Her mother passed away September 2021.    She is dealing with this loss.    Plan  MD GARCIA 1 year (because she is over 5 years out from triple negative breast cancer, this is likely cured)  Her port has been removed.   Last mammogram 5/18/2024, BI-RADS 2    31 minutes.  Total time.  Same day.  I reviewed the importance of regular exercise as she is doing and continuing to minimize alcohol as she is doing regarding decreasing the risk of developing a new breast cancer.

## 2025-04-30 ENCOUNTER — TELEPHONE (OUTPATIENT)
Dept: ONCOLOGY | Facility: CLINIC | Age: 60
End: 2025-04-30
Payer: MEDICARE

## 2025-04-30 NOTE — TELEPHONE ENCOUNTER
Caller: Meenakshi Mae    Relationship: Self    Best call back number:   Telephone Information:   Mobile [phone]     What was the call regarding: PATIENTS  NEED'S FOR DR. HERBERT TO WRITE A LETTER STATING SHE IS ON DISABILITY TO GET A DISCOUNT ON HER RENT.    NEEDS ASAP DUE TODAY 4/30/2025, PLEASE FAX [phone]   [facility] APARTMENTS, ATTEN: ASIF SOTO     CALL HER TO LET HER KNOW THIS HAS BEEN SENT

## 2025-04-30 NOTE — TELEPHONE ENCOUNTER
Call returned to Ms. Marrufoer. Advised that she was treated for breast cancer and has completed treatment/recovered since

## 2025-04-30 NOTE — TELEPHONE ENCOUNTER
"Call returned to Ms. Mae. Advised that I was forwarded her message. She stated she needs a note faxed to her [email or web address] stating she is on disability. Advised that from a Hematology/Oncology standpoint, we would not consider her disabled. Her breast cancer was diagnosed in 2013 and she underwent treatment at that time and is several years out from treatment completion. I advised that she should contact her PCP office as maybe they could speak to her current situation. She stated \"you mean I am on disability and my doctor doesn't know anything about it.\" Advised I wasn't certain about any other health problems she may have, but maybe they are addressed under her PCP or another physician. She stated her PCP didn't know anything about this either. Encouraged her to call the disability people and seek assistance from them. She verbalized understanding.   "

## 2025-05-05 ENCOUNTER — OFFICE VISIT (OUTPATIENT)
Dept: INTERNAL MEDICINE | Facility: CLINIC | Age: 60
End: 2025-05-05
Payer: MEDICARE

## 2025-05-05 VITALS
WEIGHT: 162 LBS | DIASTOLIC BLOOD PRESSURE: 86 MMHG | HEART RATE: 115 BPM | BODY MASS INDEX: 25.43 KG/M2 | SYSTOLIC BLOOD PRESSURE: 132 MMHG | OXYGEN SATURATION: 98 % | HEIGHT: 67 IN

## 2025-05-05 DIAGNOSIS — E55.9 VITAMIN D DEFICIENCY: ICD-10-CM

## 2025-05-05 DIAGNOSIS — L30.9 DERMATITIS: Primary | ICD-10-CM

## 2025-05-05 DIAGNOSIS — G89.29 CHRONIC LEFT-SIDED LOW BACK PAIN WITH LEFT-SIDED SCIATICA: ICD-10-CM

## 2025-05-05 DIAGNOSIS — M54.42 CHRONIC LEFT-SIDED LOW BACK PAIN WITH LEFT-SIDED SCIATICA: ICD-10-CM

## 2025-05-05 RX ORDER — LORATADINE 10 MG/1
10 TABLET ORAL DAILY
Qty: 90 TABLET | Refills: 1 | Status: SHIPPED | OUTPATIENT
Start: 2025-05-05

## 2025-05-05 RX ORDER — TRIAMCINOLONE ACETONIDE 1 MG/G
1 OINTMENT TOPICAL 2 TIMES DAILY
Qty: 30 G | Refills: 1 | Status: SHIPPED | OUTPATIENT
Start: 2025-05-05

## 2025-05-05 RX ORDER — ERGOCALCIFEROL 1.25 MG/1
50000 CAPSULE, LIQUID FILLED ORAL WEEKLY
Qty: 12 CAPSULE | Refills: 1 | Status: SHIPPED | OUTPATIENT
Start: 2025-05-05

## 2025-05-05 NOTE — PROGRESS NOTES
Chief Complaint   Patient presents with    Rash    Back Pain    vit d def       Rash     Meneakshi Mae is a 59 y.o. female presents for follow up evaluation. She has a new dermatitis. Left lower  extrimity. Distal to knee and another one proximal.   Patient has vitamin d deficiency. Was rx weekly vitamin d. She realized after 6 days that she was taking daily instead of weekly.   Patient notes some improved but continued back pain which is limiting her functionality.     The following portions of the patient's history were reviewed and updated as appropriate: allergies, current medications, past family history, past medical history, past social history, past surgical history and problem list.  Current Outpatient Medications on File Prior to Visit   Medication Sig Dispense Refill    albuterol sulfate  (90 Base) MCG/ACT inhaler Inhale 2 puffs Every 4 (Four) Hours As Needed for Wheezing. 18 g 3    amLODIPine (NORVASC) 2.5 MG tablet Take 1 tablet by mouth Daily. 90 tablet 1    buPROPion SR (Wellbutrin SR) 200 MG 12 hr tablet Take 1 tablet by mouth Every Morning. 90 tablet 3    Cholecalciferol (Vitamin D) 50 MCG (2000 UT) capsule Take 1 capsule by mouth Daily. 90 capsule 3    clonazePAM (KlonoPIN) 0.5 MG tablet Take 1 tablet by mouth Daily As Needed for Anxiety. 30 tablet 1    cyclobenzaprine (FLEXERIL) 10 MG tablet TAKE 1 TABLET BY MOUTH 2 TIMES A DAY AS NEEDED FOR MUSCLE SPASMS 30 tablet 4    diclofenac (VOLTAREN) 1 % gel gel APPLY 4 GRAMS TO AFFECTED AREA(S) FOUR TIMES A DAY 1 tube 4    doxepin (SINEquan) 10 MG capsule Take 1 capsule by mouth Every Night. 90 capsule 3    fluticasone (FLONASE) 50 MCG/ACT nasal spray Administer 2 sprays into the nostril(s) as directed by provider Daily. As directed by the provider 16 g 3    lidocaine (LIDODERM) 5 % Place 2 patches on the skin as directed by provider Daily. Remove & Discard patch within 12 hours or as directed by MD Bonner patch 3    PARoxetine (PAXIL) 40 MG tablet  Take 1 tablet by mouth Every Morning. 90 tablet 3    rosuvastatin (Crestor) 5 MG tablet Take 1 tablet by mouth Daily. 90 tablet 3    Symbicort 160-4.5 MCG/ACT inhaler Inhale 2 puffs 2 (Two) Times a Day. 1 each 5    vitamin B-12 (CYANOCOBALAMIN) 1000 MCG tablet Take 1 tablet by mouth Daily. 90 tablet 3    [DISCONTINUED] vitamin D (ERGOCALCIFEROL) 1.25 MG (13962 UT) capsule capsule Take 1 capsule by mouth 1 (One) Time Per Week. 12 capsule 1     No current facility-administered medications on file prior to visit.     Review of Systems   Constitutional: Negative.    HENT: Negative.     Eyes: Negative.    Respiratory: Negative.     Cardiovascular: Negative.    Gastrointestinal: Negative.    Endocrine: Negative.    Genitourinary: Negative.    Musculoskeletal: Negative.    Skin:  Positive for rash.   Allergic/Immunologic: Negative.    Neurological: Negative.    Hematological: Negative.    Psychiatric/Behavioral: Negative.         Objective   Physical Exam  Vitals and nursing note reviewed.   Constitutional:       Appearance: Normal appearance. She is well-developed.   HENT:      Head: Normocephalic and atraumatic.      Right Ear: Tympanic membrane and external ear normal.      Left Ear: Tympanic membrane and external ear normal.      Nose: Nose normal.      Mouth/Throat:      Mouth: Mucous membranes are moist.   Eyes:      Extraocular Movements: Extraocular movements intact.      Pupils: Pupils are equal, round, and reactive to light.   Cardiovascular:      Rate and Rhythm: Normal rate and regular rhythm.      Pulses: Normal pulses.      Heart sounds: Normal heart sounds.   Pulmonary:      Effort: Pulmonary effort is normal. No respiratory distress.      Breath sounds: Normal breath sounds.   Abdominal:      General: Abdomen is flat.      Palpations: Abdomen is soft.   Musculoskeletal:         General: Normal range of motion.      Cervical back: Normal range of motion and neck supple.   Skin:     General: Skin is warm and  "dry.   Neurological:      General: No focal deficit present.      Mental Status: She is alert and oriented to person, place, and time.   Psychiatric:         Mood and Affect: Mood normal.         Behavior: Behavior normal.         Thought Content: Thought content normal.         Judgment: Judgment normal.          /86   Pulse 115   Ht 170.2 cm (67\")   Wt 73.5 kg (162 lb)   LMP  (LMP Unknown)   SpO2 98%   BMI 25.37 kg/m²     Assessment & Plan   Diagnoses and all orders for this visit:    Dermatitis    Vitamin D deficiency    Chronic left-sided low back pain with left-sided sciatica    Other orders  -     triamcinolone (KENALOG) 0.1 % ointment; Apply 1 Application topically to the appropriate area as directed 2 (Two) Times a Day.  -     loratadine (Claritin) 10 MG tablet; Take 1 tablet by mouth Daily.  -     vitamin D (ERGOCALCIFEROL) 1.25 MG (57209 UT) capsule capsule; Take 1 capsule by mouth 1 (One) Time Per Week.    Patient w dermatitis. Will treat w triamcinolone ointment and claritin. Avoid heat and scratching. May use cool packs (but not directly to the skin). She is counseled on proper way to administer her vit d rx. She has MRI pending for low back pain and will refer for epidural v pain management per results.              "

## 2025-05-12 RX ORDER — PAROXETINE 40 MG/1
40 TABLET, FILM COATED ORAL EVERY MORNING
Qty: 90 TABLET | Refills: 3 | Status: SHIPPED | OUTPATIENT
Start: 2025-05-12

## 2025-05-20 ENCOUNTER — HOSPITAL ENCOUNTER (OUTPATIENT)
Dept: MAMMOGRAPHY | Facility: HOSPITAL | Age: 60
Discharge: HOME | End: 2025-05-20
Admitting: INTERNAL MEDICINE
Payer: MEDICARE

## 2025-05-20 DIAGNOSIS — Z12.31 VISIT FOR SCREENING MAMMOGRAM: ICD-10-CM

## 2025-05-20 PROCEDURE — 77063 BREAST TOMOSYNTHESIS BI: CPT

## 2025-05-20 PROCEDURE — 77067 SCR MAMMO BI INCL CAD: CPT

## 2025-05-22 ENCOUNTER — HOSPITAL ENCOUNTER (OUTPATIENT)
Dept: MRI IMAGING | Facility: HOSPITAL | Age: 60
Discharge: HOME | End: 2025-05-22
Admitting: INTERNAL MEDICINE
Payer: MEDICARE

## 2025-05-22 DIAGNOSIS — M51.362 DEGENERATION OF INTERVERTEBRAL DISC OF LUMBAR REGION WITH DISCOGENIC BACK PAIN AND LOWER EXTREMITY PAIN: ICD-10-CM

## 2025-05-22 DIAGNOSIS — G89.29 CHRONIC LEFT-SIDED LOW BACK PAIN WITH LEFT-SIDED SCIATICA: ICD-10-CM

## 2025-05-22 DIAGNOSIS — M54.42 CHRONIC LEFT-SIDED LOW BACK PAIN WITH LEFT-SIDED SCIATICA: ICD-10-CM

## 2025-05-22 PROCEDURE — 72148 MRI LUMBAR SPINE W/O DYE: CPT

## 2025-05-25 ENCOUNTER — RESULTS FOLLOW-UP (OUTPATIENT)
Dept: ADMINISTRATIVE | Facility: HOSPITAL | Age: 60
End: 2025-05-25
Payer: MEDICARE

## 2025-05-25 DIAGNOSIS — M54.42 CHRONIC LEFT-SIDED LOW BACK PAIN WITH LEFT-SIDED SCIATICA: Primary | ICD-10-CM

## 2025-05-25 DIAGNOSIS — G89.29 CHRONIC LEFT-SIDED LOW BACK PAIN WITH LEFT-SIDED SCIATICA: Primary | ICD-10-CM

## 2025-05-25 DIAGNOSIS — M48.061 NEUROFORAMINAL STENOSIS OF LUMBAR SPINE: ICD-10-CM

## 2025-05-29 DIAGNOSIS — L30.9 DERMATITIS: Primary | ICD-10-CM

## 2025-06-10 DIAGNOSIS — L30.9 DERMATITIS: Primary | ICD-10-CM

## 2025-06-13 ENCOUNTER — TELEPHONE (OUTPATIENT)
Dept: INTERNAL MEDICINE | Facility: CLINIC | Age: 60
End: 2025-06-13
Payer: MEDICARE

## 2025-06-17 ENCOUNTER — TELEPHONE (OUTPATIENT)
Dept: PAIN MEDICINE | Facility: CLINIC | Age: 60
End: 2025-06-17
Payer: MEDICARE

## 2025-06-17 NOTE — TELEPHONE ENCOUNTER
Called pt - left . Reminded her of her new pt appt tomorrow with Avel PFEIFFER at 10:00. Asked her to arrive 15 min prior to appt time.

## 2025-07-21 ENCOUNTER — OFFICE VISIT (OUTPATIENT)
Dept: INTERNAL MEDICINE | Facility: CLINIC | Age: 60
End: 2025-07-21
Payer: MEDICARE

## 2025-07-21 VITALS
WEIGHT: 164 LBS | BODY MASS INDEX: 25.74 KG/M2 | DIASTOLIC BLOOD PRESSURE: 88 MMHG | HEIGHT: 67 IN | OXYGEN SATURATION: 98 % | HEART RATE: 86 BPM | SYSTOLIC BLOOD PRESSURE: 134 MMHG

## 2025-07-21 DIAGNOSIS — F41.9 ANXIETY: ICD-10-CM

## 2025-07-21 DIAGNOSIS — I10 ESSENTIAL HYPERTENSION: Primary | ICD-10-CM

## 2025-07-21 DIAGNOSIS — R73.01 IFG (IMPAIRED FASTING GLUCOSE): ICD-10-CM

## 2025-07-21 DIAGNOSIS — E78.5 HYPERLIPIDEMIA, UNSPECIFIED HYPERLIPIDEMIA TYPE: ICD-10-CM

## 2025-07-21 DIAGNOSIS — F32.A DEPRESSION, UNSPECIFIED DEPRESSION TYPE: ICD-10-CM

## 2025-07-21 DIAGNOSIS — E55.9 VITAMIN D DEFICIENCY: ICD-10-CM

## 2025-07-21 PROCEDURE — 3075F SYST BP GE 130 - 139MM HG: CPT | Performed by: INTERNAL MEDICINE

## 2025-07-21 PROCEDURE — 1126F AMNT PAIN NOTED NONE PRSNT: CPT | Performed by: INTERNAL MEDICINE

## 2025-07-21 PROCEDURE — 99214 OFFICE O/P EST MOD 30 MIN: CPT | Performed by: INTERNAL MEDICINE

## 2025-07-21 PROCEDURE — 3079F DIAST BP 80-89 MM HG: CPT | Performed by: INTERNAL MEDICINE

## 2025-07-21 PROCEDURE — G2211 COMPLEX E/M VISIT ADD ON: HCPCS | Performed by: INTERNAL MEDICINE

## 2025-07-21 NOTE — PROGRESS NOTES
"Chief Complaint   Patient presents with    Hypertension    Anxiety    Depression    vit d def       Hypertension  Associated symptoms: no chest pain       Meenakshi Mae is a 60 y.o. female presents for follow up evaluation. Patient notes that she is doing well today. Has htn, anxiety, depression, vit d deficiency. In general doing well today. She had a reaction to vitamin d tablet rx weekly but was taking daily. Had a \"big time\" rash with this.   Has hypertension. Notes bp runs 117-137/60s-70s. She is taking amlodipine every morning. She has stopped caffeine. Does note recent stressors and did react with anger. She feels much more at peace at this time.     The following portions of the patient's history were reviewed and updated as appropriate: allergies, current medications, past family history, past medical history, past social history, past surgical history and problem list.  Current Outpatient Medications on File Prior to Visit   Medication Sig Dispense Refill    albuterol sulfate  (90 Base) MCG/ACT inhaler Inhale 2 puffs Every 4 (Four) Hours As Needed for Wheezing. 18 g 3    amLODIPine (NORVASC) 2.5 MG tablet Take 1 tablet by mouth Daily. 90 tablet 1    buPROPion SR (Wellbutrin SR) 200 MG 12 hr tablet Take 1 tablet by mouth Every Morning. 90 tablet 3    Cholecalciferol (Vitamin D) 50 MCG (2000 UT) capsule Take 1 capsule by mouth Daily. 90 capsule 3    clonazePAM (KlonoPIN) 0.5 MG tablet Take 1 tablet by mouth Daily As Needed for Anxiety. 30 tablet 1    cyclobenzaprine (FLEXERIL) 10 MG tablet TAKE 1 TABLET BY MOUTH 2 TIMES A DAY AS NEEDED FOR MUSCLE SPASMS 30 tablet 4    diclofenac (VOLTAREN) 1 % gel gel APPLY 4 GRAMS TO AFFECTED AREA(S) FOUR TIMES A DAY 1 tube 4    doxepin (SINEquan) 10 MG capsule Take 1 capsule by mouth Every Night. 90 capsule 3    fluticasone (FLONASE) 50 MCG/ACT nasal spray Administer 2 sprays into the nostril(s) as directed by provider Daily. As directed by the provider 16 g 3    " lidocaine (LIDODERM) 5 % Place 2 patches on the skin as directed by provider Daily. Remove & Discard patch within 12 hours or as directed by MD 60 patch 3    loratadine (Claritin) 10 MG tablet Take 1 tablet by mouth Daily. 90 tablet 1    PARoxetine (PAXIL) 40 MG tablet TAKE 1 TABLET BY MOUTH EVERY MORNING 90 tablet 3    rosuvastatin (Crestor) 5 MG tablet Take 1 tablet by mouth Daily. 90 tablet 3    Symbicort 160-4.5 MCG/ACT inhaler Inhale 2 puffs 2 (Two) Times a Day. 1 each 5    triamcinolone (KENALOG) 0.1 % ointment Apply 1 Application topically to the appropriate area as directed 2 (Two) Times a Day. 30 g 1    vitamin B-12 (CYANOCOBALAMIN) 1000 MCG tablet Take 1 tablet by mouth Daily. 90 tablet 3    vitamin D (ERGOCALCIFEROL) 1.25 MG (18192 UT) capsule capsule Take 1 capsule by mouth 1 (One) Time Per Week. 12 capsule 1     No current facility-administered medications on file prior to visit.     Review of Systems   Constitutional:  Negative for chills and diaphoresis.   HENT:  Negative for congestion.    Eyes: Negative.    Respiratory:  Negative for cough.    Cardiovascular:  Negative for chest pain.   Gastrointestinal:  Negative for abdominal pain.   Endocrine: Negative.    Genitourinary: Negative.    Musculoskeletal: Negative.    Skin: Negative.    Allergic/Immunologic: Negative.    Neurological: Negative.    Hematological: Negative.    Psychiatric/Behavioral: Negative.         Objective   Physical Exam  Vitals and nursing note reviewed.   Constitutional:       Appearance: Normal appearance. She is well-developed.   HENT:      Head: Normocephalic and atraumatic.      Right Ear: Tympanic membrane and external ear normal.      Left Ear: Tympanic membrane and external ear normal.      Nose: Nose normal.      Mouth/Throat:      Mouth: Mucous membranes are moist.   Eyes:      Extraocular Movements: Extraocular movements intact.      Pupils: Pupils are equal, round, and reactive to light.   Cardiovascular:      Rate  "and Rhythm: Normal rate and regular rhythm.      Pulses: Normal pulses.      Heart sounds: Normal heart sounds.   Pulmonary:      Effort: Pulmonary effort is normal. No respiratory distress.      Breath sounds: Normal breath sounds.   Abdominal:      General: Abdomen is flat.      Palpations: Abdomen is soft.   Musculoskeletal:         General: Normal range of motion.      Cervical back: Normal range of motion and neck supple.   Skin:     General: Skin is warm and dry.   Neurological:      General: No focal deficit present.      Mental Status: She is alert and oriented to person, place, and time.   Psychiatric:         Mood and Affect: Mood normal.         Behavior: Behavior normal.         Thought Content: Thought content normal.         Judgment: Judgment normal.          /88   Pulse 86   Ht 170.2 cm (67\")   Wt 74.4 kg (164 lb)   LMP  (LMP Unknown)   SpO2 98%   BMI 25.69 kg/m²     Assessment & Plan   Diagnoses and all orders for this visit:    Essential hypertension    Anxiety  -     Ambulatory Referral to Psychology    Depression, unspecified depression type  -     Ambulatory Referral to Psychology    Vitamin D deficiency    Patient w hypertension. Gave DASH guidelines. To remain on amlodipine at this time. To increase amlodipine to 5 mg if bp >130. She will continue paroxetine, buproprion. Has prn clonazepam. She is strongly encouraged to meet w a therapist. She contracts for safety. She will hydrate well and get healthy movement. Continue current for lipid regulation. She will f/u in 6 mo or prn. Total visit 31 min w counseling of mood and anger, discussion on therapist, review of labs from April. Bp discussion. Gave DASH lit. Off vit d due to overload in the past. Will gradually increase vit d but start w 1000 two days a week then.       Psychologists:    Miah Campbell, PhD  [address]  [address] [place] [address] [phone]    Melanie Carrera, PhD  [address] [facility].  [facility] [phone]    Janna Burnett, Ph. D.   [address] [facility] [address]  [phone]    Chang Thao, PhD  [address] [place] [address]  [phone]    Moira Watt, PhD  [address] [place] [address]  [phone]    Deon Hyman PsyD  [address] [facility] [address]  [place] [address]  [phone]    Dr.Elizabeth Mane PhD  [address] [place] [address]  [phone]    Michael Fournier PsyD  [address]   [place] [address]  [phone]    Tobi Sanders PsyD (For children and Family)  [address] [facility]  [facility], [address]  [phone]    Felicia Hansen PsyD  [address] [facility], [address]  [phone]    Jasmyn Jovel Ps (children and families)  [address] [facility].   [facility] [address]   [phone]    Yenny Smith PhD   [address] [place] [address]  [phone]    Rukhsana Jovel PhD  Licensed Psychologist  [facility]  [address] [place] [address]  [phone]    Yasmany Hernandez PsyD  [address] [facility] [address]  [place], [place] [address] [phone]    Baljinder Hernandez PsyD  [address] [facility] [address]  [place], [place] [id number] [phone]    Romeo Marr PsyD  [address] [place], [place] [address] [phone]    Counselors    [facility]  [address] [facility] [address] Fax: [phone]  [place] [address]  Phone: [phone]  Fax: [phone]  [email or web address]  Multiple providers providing services for a wide range of mental health needs.      Erick Prieto, [facility]  [address] [place] [address] [phone]  [email or web address]  Specialty:  Anxiety therapy, bipolar therapy, depression therapy, family therapy, general therapy.      [facility], [address] [address]   Phone  [phone]    [place], [place]  [phone]    [facility]  [address] [place], [place]  [phone]    YUNIEL Dsouza  [address] [place] [address] [phone]

## 2025-07-31 LAB
ALBUMIN SERPL-MCNC: 4.6 G/DL (ref 3.5–5.2)
ALBUMIN/GLOB SERPL: 2.1 G/DL
ALP SERPL-CCNC: 135 U/L (ref 39–117)
ALT SERPL-CCNC: 36 U/L (ref 1–33)
AST SERPL-CCNC: 33 U/L (ref 1–32)
BASOPHILS # BLD AUTO: 0.07 10*3/MM3 (ref 0–0.2)
BASOPHILS NFR BLD AUTO: 1.3 % (ref 0–1.5)
BILIRUB SERPL-MCNC: 0.6 MG/DL (ref 0–1.2)
BUN SERPL-MCNC: 8 MG/DL (ref 8–23)
BUN/CREAT SERPL: 10.1 (ref 7–25)
CALCIUM SERPL-MCNC: 9.6 MG/DL (ref 8.6–10.5)
CHLORIDE SERPL-SCNC: 104 MMOL/L (ref 98–107)
CHOLEST SERPL-MCNC: 95 MG/DL (ref 0–200)
CO2 SERPL-SCNC: 27.6 MMOL/L (ref 22–29)
CREAT SERPL-MCNC: 0.79 MG/DL (ref 0.57–1)
EGFRCR SERPLBLD CKD-EPI 2021: 84.7 ML/MIN/1.73
EOSINOPHIL # BLD AUTO: 0.11 10*3/MM3 (ref 0–0.4)
EOSINOPHIL NFR BLD AUTO: 2 % (ref 0.3–6.2)
ERYTHROCYTE [DISTWIDTH] IN BLOOD BY AUTOMATED COUNT: 13.8 % (ref 12.3–15.4)
GLOBULIN SER CALC-MCNC: 2.2 GM/DL
GLUCOSE SERPL-MCNC: 105 MG/DL (ref 65–99)
HBA1C MFR BLD: 5.6 % (ref 4.8–5.6)
HCT VFR BLD AUTO: 39.4 % (ref 34–46.6)
HDLC SERPL-MCNC: 56 MG/DL (ref 40–60)
HGB BLD-MCNC: 12.6 G/DL (ref 12–15.9)
IMM GRANULOCYTES # BLD AUTO: 0.01 10*3/MM3 (ref 0–0.05)
IMM GRANULOCYTES NFR BLD AUTO: 0.2 % (ref 0–0.5)
LDLC SERPL CALC-MCNC: 23 MG/DL (ref 0–100)
LDLC/HDLC SERPL: 0.43 {RATIO}
LYMPHOCYTES # BLD AUTO: 2.17 10*3/MM3 (ref 0.7–3.1)
LYMPHOCYTES NFR BLD AUTO: 39.6 % (ref 19.6–45.3)
MCH RBC QN AUTO: 28.1 PG (ref 26.6–33)
MCHC RBC AUTO-ENTMCNC: 32 G/DL (ref 31.5–35.7)
MCV RBC AUTO: 87.8 FL (ref 79–97)
MONOCYTES # BLD AUTO: 0.48 10*3/MM3 (ref 0.1–0.9)
MONOCYTES NFR BLD AUTO: 8.8 % (ref 5–12)
NEUTROPHILS # BLD AUTO: 2.64 10*3/MM3 (ref 1.7–7)
NEUTROPHILS NFR BLD AUTO: 48.1 % (ref 42.7–76)
NRBC BLD AUTO-RTO: 0 /100 WBC (ref 0–0.2)
PLATELET # BLD AUTO: 311 10*3/MM3 (ref 140–450)
POTASSIUM SERPL-SCNC: 4.5 MMOL/L (ref 3.5–5.2)
PROT SERPL-MCNC: 6.8 G/DL (ref 6–8.5)
RBC # BLD AUTO: 4.49 10*6/MM3 (ref 3.77–5.28)
SODIUM SERPL-SCNC: 143 MMOL/L (ref 136–145)
TRIGL SERPL-MCNC: 75 MG/DL (ref 0–150)
TSH SERPL DL<=0.005 MIU/L-ACNC: 1.15 UIU/ML (ref 0.27–4.2)
VLDLC SERPL CALC-MCNC: 16 MG/DL (ref 5–40)
WBC # BLD AUTO: 5.48 10*3/MM3 (ref 3.4–10.8)

## 2025-08-05 ENCOUNTER — OFFICE VISIT (OUTPATIENT)
Dept: INTERNAL MEDICINE | Facility: CLINIC | Age: 60
End: 2025-08-05
Payer: MEDICARE

## 2025-08-05 VITALS
BODY MASS INDEX: 25.58 KG/M2 | HEART RATE: 94 BPM | WEIGHT: 163 LBS | OXYGEN SATURATION: 98 % | HEIGHT: 67 IN | SYSTOLIC BLOOD PRESSURE: 126 MMHG | DIASTOLIC BLOOD PRESSURE: 82 MMHG

## 2025-08-05 DIAGNOSIS — Z12.11 ENCOUNTER FOR COLORECTAL CANCER SCREENING: ICD-10-CM

## 2025-08-05 DIAGNOSIS — Z80.9 FAMILY HISTORY OF CANCER: ICD-10-CM

## 2025-08-05 DIAGNOSIS — J45.30 MILD PERSISTENT ASTHMA WITHOUT COMPLICATION: ICD-10-CM

## 2025-08-05 DIAGNOSIS — Z00.00 HEALTHCARE MAINTENANCE: Primary | ICD-10-CM

## 2025-08-05 DIAGNOSIS — R79.89 ELEVATED LFTS: ICD-10-CM

## 2025-08-05 DIAGNOSIS — Z12.12 ENCOUNTER FOR COLORECTAL CANCER SCREENING: ICD-10-CM

## 2025-08-05 PROCEDURE — 1125F AMNT PAIN NOTED PAIN PRSNT: CPT | Performed by: INTERNAL MEDICINE

## 2025-08-05 PROCEDURE — 3079F DIAST BP 80-89 MM HG: CPT | Performed by: INTERNAL MEDICINE

## 2025-08-05 PROCEDURE — G0439 PPPS, SUBSEQ VISIT: HCPCS | Performed by: INTERNAL MEDICINE

## 2025-08-05 PROCEDURE — 99214 OFFICE O/P EST MOD 30 MIN: CPT | Performed by: INTERNAL MEDICINE

## 2025-08-05 PROCEDURE — 1160F RVW MEDS BY RX/DR IN RCRD: CPT | Performed by: INTERNAL MEDICINE

## 2025-08-05 PROCEDURE — G2211 COMPLEX E/M VISIT ADD ON: HCPCS | Performed by: INTERNAL MEDICINE

## 2025-08-05 PROCEDURE — 3074F SYST BP LT 130 MM HG: CPT | Performed by: INTERNAL MEDICINE

## 2025-08-05 PROCEDURE — 1159F MED LIST DOCD IN RCRD: CPT | Performed by: INTERNAL MEDICINE

## 2025-08-05 PROCEDURE — 1170F FXNL STATUS ASSESSED: CPT | Performed by: INTERNAL MEDICINE

## 2025-08-05 RX ORDER — BUDESONIDE AND FORMOTEROL FUMARATE DIHYDRATE 160; 4.5 UG/1; UG/1
2 AEROSOL RESPIRATORY (INHALATION)
Qty: 1 EACH | Refills: 5 | Status: SHIPPED | OUTPATIENT
Start: 2025-08-05

## 2025-08-05 RX ORDER — ROSUVASTATIN CALCIUM 5 MG/1
5 TABLET, COATED ORAL DAILY
Qty: 90 TABLET | Refills: 3 | Status: SHIPPED | OUTPATIENT
Start: 2025-08-05

## 2025-08-15 ENCOUNTER — HOSPITAL ENCOUNTER (OUTPATIENT)
Dept: ULTRASOUND IMAGING | Facility: HOSPITAL | Age: 60
Discharge: HOME | End: 2025-08-15
Payer: MEDICARE

## 2025-08-15 ENCOUNTER — TELEPHONE (OUTPATIENT)
Dept: GENETICS | Facility: HOSPITAL | Age: 60
End: 2025-08-15
Payer: MEDICARE

## 2025-08-15 DIAGNOSIS — R79.89 ELEVATED LFTS: ICD-10-CM

## 2025-08-15 PROCEDURE — 76705 ECHO EXAM OF ABDOMEN: CPT

## 2025-08-18 ENCOUNTER — LAB (OUTPATIENT)
Dept: LAB | Facility: HOSPITAL | Age: 60
End: 2025-08-18
Payer: MEDICARE

## 2025-08-18 ENCOUNTER — CLINICAL SUPPORT (OUTPATIENT)
Dept: GENETICS | Facility: HOSPITAL | Age: 60
End: 2025-08-18

## 2025-08-18 DIAGNOSIS — C50.411 MALIGNANT NEOPLASM OF UPPER-OUTER QUADRANT OF RIGHT BREAST IN FEMALE, ESTROGEN RECEPTOR NEGATIVE: ICD-10-CM

## 2025-08-18 DIAGNOSIS — Z80.3 FAMILY HISTORY OF BREAST CANCER: ICD-10-CM

## 2025-08-18 DIAGNOSIS — Z13.79 GENETIC TESTING: Primary | ICD-10-CM

## 2025-08-18 DIAGNOSIS — Z80.0 FAMILY HISTORY OF COLON CANCER: ICD-10-CM

## 2025-08-18 DIAGNOSIS — Z80.42 FAMILY HISTORY OF PROSTATE CANCER: ICD-10-CM

## 2025-08-18 DIAGNOSIS — R79.89 ABNORMAL CBC: Primary | ICD-10-CM

## 2025-08-18 DIAGNOSIS — Z17.1 MALIGNANT NEOPLASM OF UPPER-OUTER QUADRANT OF RIGHT BREAST IN FEMALE, ESTROGEN RECEPTOR NEGATIVE: ICD-10-CM

## 2025-08-18 DIAGNOSIS — Z80.8 FAMILY HISTORY OF BRAIN CANCER: ICD-10-CM

## 2025-08-18 DIAGNOSIS — Z80.8 FAMILY HISTORY OF THYROID CANCER: ICD-10-CM

## 2025-08-18 LAB
BASOPHILS # BLD AUTO: 0.04 10*3/MM3 (ref 0–0.2)
BASOPHILS NFR BLD AUTO: 0.7 % (ref 0–1.5)
DEPRECATED RDW RBC AUTO: 44 FL (ref 37–54)
EOSINOPHIL # BLD AUTO: 0.09 10*3/MM3 (ref 0–0.4)
EOSINOPHIL NFR BLD AUTO: 1.5 % (ref 0.3–6.2)
ERYTHROCYTE [DISTWIDTH] IN BLOOD BY AUTOMATED COUNT: 14 % (ref 12.3–15.4)
HCT VFR BLD AUTO: 38.3 % (ref 34–46.6)
HGB BLD-MCNC: 12.7 G/DL (ref 12–15.9)
IMM GRANULOCYTES # BLD AUTO: 0.01 10*3/MM3 (ref 0–0.05)
IMM GRANULOCYTES NFR BLD AUTO: 0.2 % (ref 0–0.5)
LYMPHOCYTES # BLD AUTO: 2.05 10*3/MM3 (ref 0.7–3.1)
LYMPHOCYTES NFR BLD AUTO: 34.7 % (ref 19.6–45.3)
MCH RBC QN AUTO: 28.5 PG (ref 26.6–33)
MCHC RBC AUTO-ENTMCNC: 33.2 G/DL (ref 31.5–35.7)
MCV RBC AUTO: 85.9 FL (ref 79–97)
MONOCYTES # BLD AUTO: 0.52 10*3/MM3 (ref 0.1–0.9)
MONOCYTES NFR BLD AUTO: 8.8 % (ref 5–12)
NEUTROPHILS NFR BLD AUTO: 3.19 10*3/MM3 (ref 1.7–7)
NEUTROPHILS NFR BLD AUTO: 54.1 % (ref 42.7–76)
NRBC BLD AUTO-RTO: 0 /100 WBC (ref 0–0.2)
PLATELET # BLD AUTO: 284 10*3/MM3 (ref 140–450)
PMV BLD AUTO: 10 FL (ref 6–12)
RBC # BLD AUTO: 4.46 10*6/MM3 (ref 3.77–5.28)
WBC NRBC COR # BLD AUTO: 5.9 10*3/MM3 (ref 3.4–10.8)

## 2025-08-18 PROCEDURE — 96041 GENETIC COUNSELING SVC EA 30: CPT | Performed by: GENETIC COUNSELOR, MS

## 2025-08-18 PROCEDURE — 36415 COLL VENOUS BLD VENIPUNCTURE: CPT

## 2025-08-18 PROCEDURE — 85025 COMPLETE CBC W/AUTO DIFF WBC: CPT
